# Patient Record
Sex: MALE | Race: WHITE | NOT HISPANIC OR LATINO | Employment: OTHER | URBAN - METROPOLITAN AREA
[De-identification: names, ages, dates, MRNs, and addresses within clinical notes are randomized per-mention and may not be internally consistent; named-entity substitution may affect disease eponyms.]

---

## 2017-11-02 ENCOUNTER — TELEPHONE (OUTPATIENT)
Dept: ORTHOPEDICS | Facility: CLINIC | Age: 52
End: 2017-11-02

## 2017-11-02 ENCOUNTER — TELEPHONE (OUTPATIENT)
Dept: SPINE | Facility: CLINIC | Age: 52
End: 2017-11-02

## 2017-11-02 DIAGNOSIS — M54.2 CERVICAL SPINE PAIN: Primary | ICD-10-CM

## 2017-11-10 ENCOUNTER — HOSPITAL ENCOUNTER (OUTPATIENT)
Dept: RADIOLOGY | Facility: HOSPITAL | Age: 52
Discharge: HOME | End: 2017-11-10
Attending: ORTHOPAEDIC SURGERY
Payer: MEDICARE

## 2017-11-10 ENCOUNTER — OFFICE VISIT (OUTPATIENT)
Dept: ORTHOPEDICS | Facility: CLINIC | Age: 52
End: 2017-11-10
Payer: MEDICARE

## 2017-11-10 VITALS — HEIGHT: 67 IN | WEIGHT: 185 LBS | BODY MASS INDEX: 29.03 KG/M2

## 2017-11-10 DIAGNOSIS — M48.02 CERVICAL STENOSIS OF SPINE: Primary | ICD-10-CM

## 2017-11-10 DIAGNOSIS — M54.2 CERVICAL SPINE PAIN: ICD-10-CM

## 2017-11-10 PROCEDURE — 99203 OFFICE O/P NEW LOW 30 MIN: CPT | Mod: S$PBB,,, | Performed by: ORTHOPAEDIC SURGERY

## 2017-11-10 PROCEDURE — 72050 X-RAY EXAM NECK SPINE 4/5VWS: CPT | Mod: TC

## 2017-11-10 PROCEDURE — 72050 X-RAY EXAM NECK SPINE 4/5VWS: CPT | Mod: 26,,, | Performed by: RADIOLOGY

## 2017-11-10 PROCEDURE — 99999 PR PBB SHADOW E&M-EST. PATIENT-LVL II: CPT | Mod: PBBFAC,,, | Performed by: ORTHOPAEDIC SURGERY

## 2017-11-10 PROCEDURE — 99212 OFFICE O/P EST SF 10 MIN: CPT | Mod: PBBFAC,25 | Performed by: ORTHOPAEDIC SURGERY

## 2017-11-10 RX ORDER — ASPIRIN 81 MG/1
81 TABLET ORAL DAILY
COMMUNITY
End: 2022-04-25
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
## 2017-11-16 ENCOUNTER — PATIENT MESSAGE (OUTPATIENT)
Dept: RHEUMATOLOGY | Facility: CLINIC | Age: 52
End: 2017-11-16

## 2017-11-18 ENCOUNTER — HOSPITAL ENCOUNTER (OUTPATIENT)
Dept: RADIOLOGY | Facility: HOSPITAL | Age: 52
Discharge: HOME | End: 2017-11-18
Attending: ORTHOPAEDIC SURGERY
Payer: MEDICARE

## 2017-11-18 DIAGNOSIS — M48.02 CERVICAL STENOSIS OF SPINE: ICD-10-CM

## 2017-11-18 PROCEDURE — 72141 MRI NECK SPINE W/O DYE: CPT | Mod: TC,PO

## 2017-11-18 PROCEDURE — 72141 MRI NECK SPINE W/O DYE: CPT | Mod: 26,,, | Performed by: RADIOLOGY

## 2017-11-21 ENCOUNTER — LAB VISIT (OUTPATIENT)
Dept: LAB | Facility: HOSPITAL | Age: 52
End: 2017-11-21
Attending: INTERNAL MEDICINE
Payer: MEDICARE

## 2017-11-21 ENCOUNTER — PATIENT MESSAGE (OUTPATIENT)
Dept: RHEUMATOLOGY | Facility: CLINIC | Age: 52
End: 2017-11-21

## 2017-11-21 ENCOUNTER — OFFICE VISIT (OUTPATIENT)
Dept: RHEUMATOLOGY | Facility: CLINIC | Age: 52
End: 2017-11-21
Payer: MEDICARE

## 2017-11-21 VITALS
HEART RATE: 77 BPM | RESPIRATION RATE: 18 BRPM | DIASTOLIC BLOOD PRESSURE: 71 MMHG | WEIGHT: 175 LBS | SYSTOLIC BLOOD PRESSURE: 115 MMHG | BODY MASS INDEX: 27.47 KG/M2 | HEIGHT: 67 IN

## 2017-11-21 DIAGNOSIS — M35.00 SJOGREN'S SYNDROME, WITH UNSPECIFIED ORGAN INVOLVEMENT: ICD-10-CM

## 2017-11-21 DIAGNOSIS — M35.00 SJOGREN'S SYNDROME, WITH UNSPECIFIED ORGAN INVOLVEMENT: Primary | ICD-10-CM

## 2017-11-21 LAB
ALBUMIN SERPL BCP-MCNC: 4 G/DL
ALP SERPL-CCNC: 43 U/L
ALT SERPL W/O P-5'-P-CCNC: 18 U/L
ANION GAP SERPL CALC-SCNC: 10 MMOL/L
AST SERPL-CCNC: 16 U/L
BASOPHILS # BLD AUTO: 0.07 K/UL
BASOPHILS NFR BLD: 1.2 %
BILIRUB SERPL-MCNC: 0.6 MG/DL
BUN SERPL-MCNC: 14 MG/DL
CALCIUM SERPL-MCNC: 9.6 MG/DL
CCP AB SER IA-ACNC: <0.5 U/ML
CHLORIDE SERPL-SCNC: 106 MMOL/L
CO2 SERPL-SCNC: 28 MMOL/L
CREAT SERPL-MCNC: 0.9 MG/DL
CRP SERPL-MCNC: 1.9 MG/L
DIFFERENTIAL METHOD: NORMAL
EOSINOPHIL # BLD AUTO: 0.1 K/UL
EOSINOPHIL NFR BLD: 2.3 %
ERYTHROCYTE [DISTWIDTH] IN BLOOD BY AUTOMATED COUNT: 11.9 %
ERYTHROCYTE [SEDIMENTATION RATE] IN BLOOD BY WESTERGREN METHOD: 2 MM/HR
EST. GFR  (AFRICAN AMERICAN): >60 ML/MIN/1.73 M^2
EST. GFR  (NON AFRICAN AMERICAN): >60 ML/MIN/1.73 M^2
GLUCOSE SERPL-MCNC: 75 MG/DL
HBV CORE IGM SERPL QL IA: NEGATIVE
HBV SURFACE AB SER-ACNC: NEGATIVE M[IU]/ML
HBV SURFACE AG SERPL QL IA: NEGATIVE
HCT VFR BLD AUTO: 43.7 %
HCV AB SERPL QL IA: NEGATIVE
HGB BLD-MCNC: 14.6 G/DL
IMM GRANULOCYTES # BLD AUTO: 0.01 K/UL
IMM GRANULOCYTES NFR BLD AUTO: 0.2 %
LYMPHOCYTES # BLD AUTO: 1.8 K/UL
LYMPHOCYTES NFR BLD: 29.9 %
MCH RBC QN AUTO: 29.9 PG
MCHC RBC AUTO-ENTMCNC: 33.4 G/DL
MCV RBC AUTO: 89 FL
MONOCYTES # BLD AUTO: 0.4 K/UL
MONOCYTES NFR BLD: 7.2 %
NEUTROPHILS # BLD AUTO: 3.5 K/UL
NEUTROPHILS NFR BLD: 59.2 %
NRBC BLD-RTO: 0 /100 WBC
PLATELET # BLD AUTO: 230 K/UL
PMV BLD AUTO: 10 FL
POTASSIUM SERPL-SCNC: 3.9 MMOL/L
PROT SERPL-MCNC: 7.3 G/DL
RBC # BLD AUTO: 4.89 M/UL
RHEUMATOID FACT SERPL-ACNC: <10 IU/ML
SODIUM SERPL-SCNC: 144 MMOL/L
WBC # BLD AUTO: 5.96 K/UL

## 2017-11-21 PROCEDURE — 86038 ANTINUCLEAR ANTIBODIES: CPT

## 2017-11-21 PROCEDURE — 86235 NUCLEAR ANTIGEN ANTIBODY: CPT

## 2017-11-21 PROCEDURE — 86431 RHEUMATOID FACTOR QUANT: CPT

## 2017-11-21 PROCEDURE — 99999 PR PBB SHADOW E&M-EST. PATIENT-LVL III: CPT | Mod: PBBFAC,,, | Performed by: INTERNAL MEDICINE

## 2017-11-21 PROCEDURE — 99203 OFFICE O/P NEW LOW 30 MIN: CPT | Mod: S$PBB,,, | Performed by: INTERNAL MEDICINE

## 2017-11-21 PROCEDURE — 86235 NUCLEAR ANTIGEN ANTIBODY: CPT | Mod: 59

## 2017-11-21 PROCEDURE — 84165 PROTEIN E-PHORESIS SERUM: CPT | Mod: 26,,, | Performed by: PATHOLOGY

## 2017-11-21 PROCEDURE — 86705 HEP B CORE ANTIBODY IGM: CPT

## 2017-11-21 PROCEDURE — 86706 HEP B SURFACE ANTIBODY: CPT

## 2017-11-21 PROCEDURE — 86200 CCP ANTIBODY: CPT

## 2017-11-21 PROCEDURE — 99213 OFFICE O/P EST LOW 20 MIN: CPT | Mod: PBBFAC | Performed by: INTERNAL MEDICINE

## 2017-11-21 PROCEDURE — 36415 COLL VENOUS BLD VENIPUNCTURE: CPT

## 2017-11-21 PROCEDURE — 86140 C-REACTIVE PROTEIN: CPT

## 2017-11-21 PROCEDURE — 80053 COMPREHEN METABOLIC PANEL: CPT

## 2017-11-21 PROCEDURE — 87340 HEPATITIS B SURFACE AG IA: CPT

## 2017-11-21 PROCEDURE — 86803 HEPATITIS C AB TEST: CPT

## 2017-11-21 PROCEDURE — 85651 RBC SED RATE NONAUTOMATED: CPT

## 2017-11-21 PROCEDURE — 85025 COMPLETE CBC W/AUTO DIFF WBC: CPT

## 2017-11-21 PROCEDURE — 84165 PROTEIN E-PHORESIS SERUM: CPT

## 2017-11-21 PROCEDURE — 82787 IGG 1 2 3 OR 4 EACH: CPT | Mod: 59

## 2017-11-21 RX ORDER — BUPROPION HYDROCHLORIDE 300 MG/1
300 TABLET ORAL DAILY
COMMUNITY
End: 2020-02-12

## 2017-11-21 RX ORDER — PILOCARPINE HYDROCHLORIDE 5 MG/1
5 TABLET, FILM COATED ORAL 2 TIMES DAILY
Qty: 60 TABLET | Refills: 11 | Status: SHIPPED | OUTPATIENT
Start: 2017-11-21 | End: 2018-10-02 | Stop reason: SDUPTHER

## 2017-11-21 RX ORDER — CEPHALEXIN 500 MG/1
CAPSULE ORAL
COMMUNITY
Start: 2017-09-05 | End: 2017-12-14

## 2017-11-21 RX ORDER — DIPHENHYDRAMINE HCL 25 MG
25 CAPSULE ORAL
COMMUNITY
Start: 2017-08-29 | End: 2017-12-14

## 2017-11-21 RX ORDER — PILOCARPINE HYDROCHLORIDE 5 MG/1
5 TABLET, FILM COATED ORAL 2 TIMES DAILY
Qty: 60 TABLET | Refills: 11 | Status: SHIPPED | OUTPATIENT
Start: 2017-11-21 | End: 2017-12-14 | Stop reason: SDUPTHER

## 2017-11-21 RX ORDER — PILOCARPINE HYDROCHLORIDE 5 MG/1
TABLET, FILM COATED ORAL
COMMUNITY
Start: 2017-10-12 | End: 2017-11-21 | Stop reason: SDUPTHER

## 2017-11-21 RX ORDER — ROSUVASTATIN CALCIUM 10 MG/1
TABLET, COATED ORAL
COMMUNITY
Start: 2017-11-14 | End: 2017-11-21 | Stop reason: SDUPTHER

## 2017-11-21 RX ORDER — CLINDAMYCIN HYDROCHLORIDE 300 MG/1
CAPSULE ORAL
COMMUNITY
Start: 2017-10-11 | End: 2017-12-14

## 2017-11-21 RX ORDER — ROSUVASTATIN CALCIUM 10 MG/1
10 TABLET, COATED ORAL NIGHTLY
COMMUNITY
End: 2022-10-13

## 2017-11-21 RX ORDER — METHYLPREDNISOLONE 4 MG/1
TABLET ORAL
COMMUNITY
Start: 2017-09-05 | End: 2017-12-14
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
## 2017-11-22 LAB
ACE SERPL-CCNC: 22 U/L
ALBUMIN SERPL ELPH-MCNC: 4.36 G/DL
ALPHA1 GLOB SERPL ELPH-MCNC: 0.33 G/DL
ALPHA2 GLOB SERPL ELPH-MCNC: 0.81 G/DL
ANA SER QL IF: NORMAL
B-GLOBULIN SERPL ELPH-MCNC: 0.78 G/DL
GAMMA GLOB SERPL ELPH-MCNC: 0.72 G/DL
PATHOLOGIST INTERPRETATION SPE: NORMAL
PROT SERPL-MCNC: 7 G/DL

## 2017-11-24 LAB
IGG1 SER-MCNC: 435 MG/DL
IGG2 SER-MCNC: 183 MG/DL
IGG3 SER-MCNC: 37 MG/DL
IGG4 SER-MCNC: 25 MG/DL

## 2017-11-25 LAB
ANTI-SSA ANTIBODY: 25.54 EU
ANTI-SSA INTERPRETATION: POSITIVE
ANTI-SSB ANTIBODY: 0.37 EU
ANTI-SSB INTERPRETATION: NEGATIVE

## 2017-12-04 ENCOUNTER — OFFICE VISIT (OUTPATIENT)
Dept: ORTHOPEDICS | Facility: CLINIC | Age: 52
End: 2017-12-04
Payer: MEDICARE

## 2017-12-04 VITALS — BODY MASS INDEX: 27.47 KG/M2 | HEIGHT: 67 IN | WEIGHT: 175 LBS

## 2017-12-04 DIAGNOSIS — G95.9 CERVICAL MYELOPATHY: Primary | ICD-10-CM

## 2017-12-04 PROCEDURE — 99212 OFFICE O/P EST SF 10 MIN: CPT | Mod: PBBFAC | Performed by: ORTHOPAEDIC SURGERY

## 2017-12-04 PROCEDURE — 99213 OFFICE O/P EST LOW 20 MIN: CPT | Mod: S$PBB,,, | Performed by: ORTHOPAEDIC SURGERY

## 2017-12-04 PROCEDURE — 99999 PR PBB SHADOW E&M-EST. PATIENT-LVL II: CPT | Mod: PBBFAC,,, | Performed by: ORTHOPAEDIC SURGERY

## 2017-12-05 NOTE — PROGRESS NOTES
The patient returns for MRI follow up.    He had suspected cervical stenosis seen on an MRI of the parotids.    He does not have severe myelopathic symptoms.    Today I reviewed his MRI that demonstrates severe C3/4 stenosis with evidence of cord signal abnormality.    Today we discussed options, I think he is a candidate for a C3/4 ACDF.    I had a sit down discussion with the patient regarding cervical myelopathy. I discussed the known stepwise degeneration of cervical myelopathy. I discussed the risks and benefits of decompressive surgery, including the concept that surgery would be done to prevent further neurological injury/degeneration rather than to ameliorate any existing deficits.     I spent 15 minutes with the patient of which greater than 1/2 the time was devoted to counciling the patient regarding treatment options.

## 2017-12-07 DIAGNOSIS — G95.9 CERVICAL MYELOPATHY: Primary | ICD-10-CM

## 2017-12-07 RX ORDER — MUPIROCIN 20 MG/G
OINTMENT TOPICAL
Status: CANCELLED | OUTPATIENT
Start: 2017-12-07

## 2017-12-07 RX ORDER — SODIUM CHLORIDE 9 MG/ML
INJECTION, SOLUTION INTRAVENOUS CONTINUOUS
Status: CANCELLED | OUTPATIENT
Start: 2017-12-07

## 2017-12-14 ENCOUNTER — OFFICE VISIT (OUTPATIENT)
Dept: ENDOCRINOLOGY | Facility: CLINIC | Age: 52
End: 2017-12-14
Payer: MEDICARE

## 2017-12-14 VITALS
HEIGHT: 67 IN | WEIGHT: 175.25 LBS | DIASTOLIC BLOOD PRESSURE: 80 MMHG | HEART RATE: 80 BPM | BODY MASS INDEX: 27.51 KG/M2 | SYSTOLIC BLOOD PRESSURE: 116 MMHG

## 2017-12-14 DIAGNOSIS — R94.6 ABNORMAL THYROID FUNCTION TEST: Primary | ICD-10-CM

## 2017-12-14 PROCEDURE — 99999 PR PBB SHADOW E&M-EST. PATIENT-LVL III: CPT | Mod: PBBFAC,,, | Performed by: INTERNAL MEDICINE

## 2017-12-14 PROCEDURE — 99203 OFFICE O/P NEW LOW 30 MIN: CPT | Mod: S$PBB,,, | Performed by: INTERNAL MEDICINE

## 2017-12-14 PROCEDURE — 99213 OFFICE O/P EST LOW 20 MIN: CPT | Mod: PBBFAC | Performed by: INTERNAL MEDICINE

## 2017-12-14 NOTE — PROGRESS NOTES
Subjective:     Patient ID: Van Jones is a 52 y.o. male.    Chief Complaint: No chief complaint on file.    HPI:   Mr. Jones is a 52 y.o. male who is here for a follow-up visit for evaluation of hypothyroidism that was discovered by his ENT.   Recently diagnosed by Sjogren's syndrome.     Reports depressed mood and fatigue that has improved with Wellburtrin. Has intentional weight loss ( 50 lbs over the past two and half years).  Denies constipation or cold intolerance.   Denies changes to appearance of anterior neck, denies dysphagia, hoarseness.   Denies treatment with thyroid hormone in the past.     Family history:  Father and sister with hypothyroidism.     Review of Systems   Constitutional: Negative for chills and fever.   HENT: Negative for congestion and sinus pressure.    Eyes: Negative for visual disturbance.   Respiratory: Negative for chest tightness and shortness of breath.    Cardiovascular: Negative for chest pain and palpitations.   Gastrointestinal: Negative for abdominal distention, diarrhea, nausea and vomiting.   Genitourinary: Negative for dysuria and flank pain.   Musculoskeletal: Negative for back pain.   Skin: Negative for rash.   Neurological: Negative for weakness.   Hematological: Does not bruise/bleed easily.   Psychiatric/Behavioral: Negative for sleep disturbance.        Objective:     Physical Exam   Constitutional: He appears well-developed and well-nourished. No distress.   HENT:   Head: Normocephalic and atraumatic.   Mouth/Throat: No oropharyngeal exudate.   Eyes: Conjunctivae and EOM are normal. Pupils are equal, round, and reactive to light.   Neck: Normal range of motion. Neck supple. No tracheal deviation present. No thyromegaly present.   Cardiovascular: Normal rate and regular rhythm.    Pulmonary/Chest: Effort normal and breath sounds normal.   Musculoskeletal:   No tremor.   Lymphadenopathy:     He has no cervical adenopathy.   Neurological: He has normal  "reflexes.   Skin: Skin is warm and dry.   Psychiatric: He has a normal mood and affect.       Vitals:    12/14/17 1047   BP: 116/80   BP Location: Left arm   Patient Position: Sitting   BP Method: Medium (Manual)   Pulse: 80   Weight: 79.5 kg (175 lb 4.3 oz)   Height: 5' 7" (1.702 m)         Assessment/Plan:     Mr. Jones is a 52 year old gentleman who appears clinically euthyroid. [facility] labs normal, but he was diagnosed with hypothyroidism by ENT. Will repeat labs today.   Monitor if labs are borderline  Treat if TSH > 10  Normal, monitor every 1 - 2 years due to family history.   1. Abnormal thyroid function test    - TSH; Future  - T4, free; Future          "

## 2017-12-28 ENCOUNTER — TELEPHONE (OUTPATIENT)
Dept: RHEUMATOLOGY | Facility: CLINIC | Age: 52
End: 2017-12-28

## 2017-12-28 DIAGNOSIS — M79.603 PAIN OF UPPER EXTREMITY, UNSPECIFIED LATERALITY: Primary | ICD-10-CM

## 2017-12-29 ENCOUNTER — ANESTHESIA EVENT (OUTPATIENT)
Dept: SURGERY | Facility: HOSPITAL | Age: 52
End: 2017-12-29
Payer: MEDICARE

## 2017-12-29 NOTE — ANESTHESIA PREPROCEDURE EVALUATION
01/23/2018  Van Jones is a 52 y.o., male.  Pre-operative evaluation for Procedure(s) (LRB):  CERVICAL TOTAL DISC REPLACEMENT vs. ACDF C3/4 LDR Mobi-C SNS: Motors/SSEP/EMG/Vocal Cord Regular OR Table (N/A)    Van Jones is a 52 y.o. male     Patient Active Problem List   Diagnosis    Colon cancer screening    Cervical myelopathy    Depression    RSD (reflex sympathetic dystrophy)    History of pulmonary embolism    Sjogren's syndrome    HLD (hyperlipidemia)    NSAID long-term use       Review of patient's allergies indicates:   Allergen Reactions    Lamisil [terbinafine hcl] Rash       No current facility-administered medications on file prior to encounter.      Current Outpatient Prescriptions on File Prior to Encounter   Medication Sig Dispense Refill    aspirin (ECOTRIN) 81 MG EC tablet Take 81 mg by mouth once daily.      buPROPion (WELLBUTRIN XL) 300 MG 24 hr tablet Take 300 mg by mouth once daily.       multivitamin capsule Take 1 capsule by mouth once daily.      pilocarpine (SALAGEN) 5 MG Tab Take 1 tablet (5 mg total) by mouth 2 (two) times daily. 60 tablet 11    rosuvastatin (CRESTOR) 10 MG tablet Take 10 mg by mouth every evening.          Past Surgical History:   Procedure Laterality Date    COLONOSCOPY N/A 2/25/2016    Procedure: COLONOSCOPY;  Surgeon: Daryl Viramontes MD;  Location: [address]);  Service: Endoscopy;  Laterality: N/A;  PM Prep      FEMUR SURGERY      HERNIA REPAIR      umbilical    TONSILLECTOMY         Social History     Social History    Marital status:      Spouse name: N/A    Number of children: N/A    Years of education: N/A     Occupational History    Not on file.     Social History Main Topics    Smoking status: Never Smoker    Smokeless tobacco: Never Used    Alcohol use Yes      Comment: occasional    Drug use: No     Sexual activity: Not on file     Other Topics Concern    Not on file     Social History Narrative    No narrative on file         Vital Signs Range (Last 24H):  Temp:  [36.5 °C (97.7 °F)]   Pulse:  [83]   Resp:  [16]   BP: (98)/(62)   SpO2:  [97 %]       CBC: No results for input(s): WBC, RBC, HGB, HCT, PLT, MCV, MCH, MCHC in the last 72 hours.    CMP: No results for input(s): NA, K, CL, CO2, BUN, CREATININE, GLU, MG, PHOS, CALCIUM, ALBUMIN, PROT, ALKPHOS, ALT, AST, BILITOT in the last 72 hours.    INR  No results for input(s): PT, INR, PROTIME, APTT in the last 72 hours.        Diagnostic Studies:      [facility] Echo:              Anesthesia Assessment: Preoperative EQUATION    Planned Procedure: Procedure(s) (LRB):  CERVICAL TOTAL DISC REPLACEMENT vs. ACDF C3/4 LDR Mobi-C SNS: Motors/SSEP/EMG/Vocal Cord Regular OR Table (N/A)  Requested Anesthesia Type:General  Surgeon: Saleem Noguera MD  Service: Neurosurgery  Known or anticipated Date of Surgery:2018      Optimization:  Anesthesia Preop Clinic Assessment  Indicated    Medical Opinion Indicated  DR GAITAN       Plan:    Testing:  PT/INR and T&S       Consultation:IM Perioperative Hospitalist    Navigation: Tests Scheduled.              Consults scheduled.             Results will be tracked by Preop Clinic  CLEARANCE RECEIVED PLACED IN MEDIA 18                        No anesthesia preop clinic visit.                                                                                                               2017  Van Jones is a 52 y.o., male.    Anesthesia Evaluation    I have reviewed the Patient Summary Reports.     I have reviewed the Medications.     Review of Systems  Anesthesia Hx:  No problems with previous Anesthesia    Social:  No Alcohol Use, Non-Smoker    Hematology/Oncology:  Hematology Normal   Oncology Normal     EENT/Dental:EENT/Dental Normal   Cardiovascular:   Exercise tolerance: good    Pulmonary:   pulmomary  embolism @ age of 17   Renal/:  Renal/ Normal     Hepatic/GI:  Hepatic/GI Normal    Neurological:   Neuromuscular Disease,    Endocrine:  Endocrine Normal    Psych:   depression      SYLVAIN MARCIAL 12/29/17    Physical Exam  General:  Well nourished    Airway/Jaw/Neck:  Airway Findings: Mouth Opening: Normal Tongue: Normal  Mallampati: II  TM Distance: Normal, at least 6 cm      Dental:  Dental Findings: In tact        Mental Status:  Mental Status Findings:  Cooperative, Alert and Oriented         Anesthesia Plan  Type of Anesthesia, risks & benefits discussed:  Anesthesia Type:  general  Patient's Preference: same   Intra-op Monitoring Plan: arterial line and standard ASA monitors  Intra-op Monitoring Plan Comments:   Post Op Pain Control Plan: IV/PO Opioids PRN  Post Op Pain Control Plan Comments:   Induction:   IV  Beta Blocker:  Patient is not currently on a Beta-Blocker (No further documentation required).       Informed Consent: Patient understands risks and agrees with Anesthesia plan.  Questions answered. Anesthesia consent signed with patient.  ASA Score: 3     Day of Surgery Review of History & Physical:    H&P update referred to the surgeon.         Ready For Surgery From Anesthesia Perspective.

## 2017-12-29 NOTE — PRE ADMISSION SCREENING
Anesthesia Assessment: Preoperative EQUATION    Planned Procedure: Procedure(s) (LRB):  CERVICAL TOTAL DISC REPLACEMENT vs. ACDF C3/4 LDR Mobi-C SNS: Motors/SSEP/EMG/Vocal Cord Regular OR Table (N/A)  Requested Anesthesia Type:General  Surgeon: Saleem Noguera MD  Service: Neurosurgery  Known or anticipated Date of Surgery:1/25/2018      Optimization:  Anesthesia Preop Clinic Assessment  Indicated    Medical Opinion Indicated  DR GAITAN       Plan:    Testing:  PT/INR and T&S       Consultation: Perioperative Hospitalist    Navigation: Tests Scheduled.              Consults scheduled.             Results will be tracked by Preop Clinic                        No anesthesia preop clinic visit.

## 2018-01-08 DIAGNOSIS — Z98.890 S/P SPINAL SURGERY: Primary | ICD-10-CM

## 2018-01-09 ENCOUNTER — LAB VISIT (OUTPATIENT)
Dept: LAB | Facility: HOSPITAL | Age: 53
End: 2018-01-09
Attending: INTERNAL MEDICINE
Payer: MEDICARE

## 2018-01-09 DIAGNOSIS — I25.10 CORONARY ATHEROSCLEROSIS OF NATIVE CORONARY ARTERY: Primary | ICD-10-CM

## 2018-01-09 DIAGNOSIS — G90.522 COMPLEX REGIONAL PAIN SYNDROME OF LEFT LOWER EXTREMITY: ICD-10-CM

## 2018-01-09 DIAGNOSIS — E78.00 PURE HYPERCHOLESTEROLEMIA: ICD-10-CM

## 2018-01-09 LAB
ALBUMIN SERPL BCP-MCNC: 3.8 G/DL
ALP SERPL-CCNC: 40 U/L
ALT SERPL W/O P-5'-P-CCNC: 31 U/L
ANION GAP SERPL CALC-SCNC: 9 MMOL/L
AST SERPL-CCNC: 18 U/L
BASOPHILS # BLD AUTO: 0.07 K/UL
BASOPHILS NFR BLD: 1.1 %
BILIRUB SERPL-MCNC: 0.5 MG/DL
BUN SERPL-MCNC: 15 MG/DL
CALCIUM SERPL-MCNC: 9.5 MG/DL
CHLORIDE SERPL-SCNC: 108 MMOL/L
CHOLEST SERPL-MCNC: 131 MG/DL
CHOLEST/HDLC SERPL: 3 {RATIO}
CO2 SERPL-SCNC: 27 MMOL/L
CREAT SERPL-MCNC: 1 MG/DL
DIFFERENTIAL METHOD: NORMAL
EOSINOPHIL # BLD AUTO: 0.2 K/UL
EOSINOPHIL NFR BLD: 3.2 %
ERYTHROCYTE [DISTWIDTH] IN BLOOD BY AUTOMATED COUNT: 11.9 %
EST. GFR  (AFRICAN AMERICAN): >60 ML/MIN/1.73 M^2
EST. GFR  (NON AFRICAN AMERICAN): >60 ML/MIN/1.73 M^2
GLUCOSE SERPL-MCNC: 100 MG/DL
HCT VFR BLD AUTO: 44.5 %
HDLC SERPL-MCNC: 43 MG/DL
HDLC SERPL: 32.8 %
HGB BLD-MCNC: 14.9 G/DL
IMM GRANULOCYTES # BLD AUTO: 0.01 K/UL
IMM GRANULOCYTES NFR BLD AUTO: 0.2 %
LDLC SERPL CALC-MCNC: 74.4 MG/DL
LYMPHOCYTES # BLD AUTO: 2.2 K/UL
LYMPHOCYTES NFR BLD: 33.8 %
MCH RBC QN AUTO: 30.6 PG
MCHC RBC AUTO-ENTMCNC: 33.5 G/DL
MCV RBC AUTO: 91 FL
MONOCYTES # BLD AUTO: 0.6 K/UL
MONOCYTES NFR BLD: 9.4 %
NEUTROPHILS # BLD AUTO: 3.4 K/UL
NEUTROPHILS NFR BLD: 52.3 %
NONHDLC SERPL-MCNC: 88 MG/DL
NRBC BLD-RTO: 0 /100 WBC
PLATELET # BLD AUTO: 228 K/UL
PMV BLD AUTO: 10.4 FL
POTASSIUM SERPL-SCNC: 4.2 MMOL/L
PROT SERPL-MCNC: 6.8 G/DL
RBC # BLD AUTO: 4.87 M/UL
SODIUM SERPL-SCNC: 144 MMOL/L
TRIGL SERPL-MCNC: 68 MG/DL
WBC # BLD AUTO: 6.47 K/UL

## 2018-01-09 PROCEDURE — 36415 COLL VENOUS BLD VENIPUNCTURE: CPT | Mod: PO

## 2018-01-09 PROCEDURE — 80053 COMPREHEN METABOLIC PANEL: CPT

## 2018-01-09 PROCEDURE — 80061 LIPID PANEL: CPT

## 2018-01-09 PROCEDURE — 85025 COMPLETE CBC W/AUTO DIFF WBC: CPT

## 2018-01-12 ENCOUNTER — INITIAL CONSULT (OUTPATIENT)
Dept: INTERNAL MEDICINE | Facility: CLINIC | Age: 53
End: 2018-01-12
Payer: MEDICARE

## 2018-01-12 ENCOUNTER — OFFICE VISIT (OUTPATIENT)
Dept: ORTHOPEDICS | Facility: CLINIC | Age: 53
End: 2018-01-12
Payer: MEDICARE

## 2018-01-12 ENCOUNTER — LAB VISIT (OUTPATIENT)
Dept: LAB | Facility: HOSPITAL | Age: 53
End: 2018-01-12
Attending: ANESTHESIOLOGY
Payer: MEDICARE

## 2018-01-12 VITALS
HEIGHT: 67 IN | WEIGHT: 174.5 LBS | DIASTOLIC BLOOD PRESSURE: 74 MMHG | BODY MASS INDEX: 27.39 KG/M2 | SYSTOLIC BLOOD PRESSURE: 112 MMHG | TEMPERATURE: 98 F | HEART RATE: 83 BPM | OXYGEN SATURATION: 96 %

## 2018-01-12 DIAGNOSIS — F32.A DEPRESSION, UNSPECIFIED DEPRESSION TYPE: ICD-10-CM

## 2018-01-12 DIAGNOSIS — G90.50 RSD (REFLEX SYMPATHETIC DYSTROPHY): ICD-10-CM

## 2018-01-12 DIAGNOSIS — E78.5 HYPERLIPIDEMIA, UNSPECIFIED HYPERLIPIDEMIA TYPE: ICD-10-CM

## 2018-01-12 DIAGNOSIS — Z86.711 HISTORY OF PULMONARY EMBOLISM: ICD-10-CM

## 2018-01-12 DIAGNOSIS — Z79.1 NSAID LONG-TERM USE: ICD-10-CM

## 2018-01-12 DIAGNOSIS — G95.9 CERVICAL MYELOPATHY: Primary | ICD-10-CM

## 2018-01-12 DIAGNOSIS — M35.00 SJOGREN'S SYNDROME, WITH UNSPECIFIED ORGAN INVOLVEMENT: ICD-10-CM

## 2018-01-12 DIAGNOSIS — M79.603 PAIN OF UPPER EXTREMITY, UNSPECIFIED LATERALITY: ICD-10-CM

## 2018-01-12 LAB
ABO + RH BLD: NORMAL
BLD GP AB SCN CELLS X3 SERPL QL: NORMAL
INR PPP: 1.1
PROTHROMBIN TIME: 11.2 SEC

## 2018-01-12 PROCEDURE — 99213 OFFICE O/P EST LOW 20 MIN: CPT | Mod: PBBFAC,25 | Performed by: HOSPITALIST

## 2018-01-12 PROCEDURE — 99212 OFFICE O/P EST SF 10 MIN: CPT | Mod: PBBFAC,25,27 | Performed by: ORTHOPAEDIC SURGERY

## 2018-01-12 PROCEDURE — 99999 PR PBB SHADOW E&M-EST. PATIENT-LVL II: CPT | Mod: PBBFAC,,, | Performed by: ORTHOPAEDIC SURGERY

## 2018-01-12 PROCEDURE — 99204 OFFICE O/P NEW MOD 45 MIN: CPT | Mod: S$PBB,,, | Performed by: HOSPITALIST

## 2018-01-12 PROCEDURE — 86901 BLOOD TYPING SEROLOGIC RH(D): CPT

## 2018-01-12 PROCEDURE — 99999 PR PBB SHADOW E&M-EST. PATIENT-LVL III: CPT | Mod: PBBFAC,,, | Performed by: HOSPITALIST

## 2018-01-12 PROCEDURE — 85610 PROTHROMBIN TIME: CPT

## 2018-01-12 PROCEDURE — 99212 OFFICE O/P EST SF 10 MIN: CPT | Mod: S$PBB,,, | Performed by: ORTHOPAEDIC SURGERY

## 2018-01-12 PROCEDURE — 36415 COLL VENOUS BLD VENIPUNCTURE: CPT

## 2018-01-12 RX ORDER — CHLORHEXIDINE GLUCONATE 40 MG/ML
SOLUTION TOPICAL DAILY PRN
Qty: 118 ML | Refills: 0 | Status: ON HOLD | OUTPATIENT
Start: 2018-01-12 | End: 2018-01-23 | Stop reason: HOSPADM
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
## 2018-01-12 NOTE — ASSESSMENT & PLAN NOTE
I suggest monitoring the sodium as SIADH from Wellbutrin use and hypersecretion of ADH associated with surgery can reduce sodium in the perioperative period

## 2018-01-12 NOTE — ASSESSMENT & PLAN NOTE
Was evaluated for parotid gland swelling( Left > Rt)  that started in October 2017 , no pain  Had imaging for that that showed spine problem   lip biopsy report  was suggestive of Sjogrens.  No Dry mouth, dry eyes  No other problems  Sister has Sjogren's,Lupus    Hold cholinergic on the AM of surgery due to risk of upper respiratory secretions with endo Tracheal intubation
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
## 2018-01-12 NOTE — OUTPATIENT SUBJECTIVE & OBJECTIVE
Outpatient Subjective & Objective     Chief complaint-Preoperative evaluation, Perioperative Medical management, complication reduction plan     Active cardiac conditions- none    Revised cardiac risk index predictors- none    Functional capacity -Examples of physical activity, walks,   can take a flight of stairs holding on to the railing----- He can undertake all the above activities without  chest pain,chest tightness, Shortness of breath ,dizziness,lightheadedness making his exercise tolerance more  than 4 Mets.       Review of Systems   Constitutional: Negative for chills and fever.        No unusual weight changes  Lost 50 pounds over 2-3 years with diet    HENT:              STOPBANTAMERA- 3/8       Age over 50 years  Neck size over 40 CM  Male gender   Eyes:        No unusual vision changes   Respiratory:        No Cough ,Phlegm, Hemoptysis      Cardiovascular:        As noted   Gastrointestinal:        Bowels- Regular   No overt GI/ blood losses   Endocrine:        Recent steroid use- none   Genitourinary: Negative for dysuria.        No urinary hesitancy    Musculoskeletal:        As above      Skin: Negative for rash.   Neurological: Negative for syncope.        No unilateral weakness   Hematological:        Current use of Anticoagulants/ Antiplatelet agents  ASA prevention    Psychiatric/Behavioral:        Depression  Controlled      No vascular stenting    No past medical history pertinent negatives.  No family history on file.  Past Surgical History:   Procedure Laterality Date    COLONOSCOPY N/A 2/25/2016    Procedure: COLONOSCOPY;  Surgeon: Daryl Viramontes MD;  Location: [address]);  Service: Endoscopy;  Laterality: N/A;  PM Prep      FEMUR SURGERY      TONSILLECTOMY         No anesthesia, bleeding, cardiac problems with previous surgeries/procedures.  FH- No anesthesia, thrombosis / bleeding ,in family   Medications and Allergies reviewed in epic.   Lives alone, children come every other  "weekend    Physical Exam   HENT:   Head: Normocephalic.       Physical Exam   HENT:   Head: Normocephalic.     Constitutional- Vitals - Body mass index is 27.33 kg/m².,   Vitals:    01/12/18 1257   BP: 112/74   Pulse: 83   Temp: 98.4 °F (36.9 °C)     General appearance-Conscious,Coherent  Eyes- No conjunctival icterus,pupils  round  and reactive to light   ENT-Oral cavity- moist  , Hearing grossly normal   Neck- No thyromegaly ,Trachea -central, No jugular venous distension,   No Carotid Bruit   Cardiovascular -Heart Sounds- Normal  and  no murmur   , No gallop rhythm   Respiratory - Normal Respiratory Effort, Normal breath sounds and  no wheeze    Peripheral pitting pedal edema-- none , no calf pain   Gastrointestinal -Soft abdomen, No palpable masses, Non Tender,Liver,Spleen not palpable. No-- free fluid and shifting dullness  Musculoskeletal- No finger Clubbing. Strength grossly normal   Lymphatic-No Palpable cervical, axillary,Inguinal lymphadenopathy   Psychiatric - normal effect,Orientation  Rt Dorsalis pedis pulses-palpable    Lt Dorsalis pedis pulses- palpable   Rt Posterior tibial pulses -palpable   Left posterior tibial pulses -palpable   Miscellaneous -  no renal bruit  Blood pressure 112/74, pulse 83, temperature 98.4 °F (36.9 °C), temperature source Oral, height 5' 7" (1.702 m), weight 79.2 kg (174 lb 8 oz), SpO2 96 %.      Investigations  Lab and Imaging have been reviewed in epic.    Review of Medicine tests    EKG- I had independently reviewed the EKG from--  It was reported to be showing     Review of clinical lab tests-Date--1/9/2018 - Creatinine-1.0  Date--1/9/2018 Hemoglobin--N  Platelet count--n      Review of old records- Was done and information gathered regards to events leading to surgery and health conditions of significance in the perioperative period.    Outpatient Subjective & Objective   "
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
## 2018-01-12 NOTE — LETTER
January 12, 2018      Saleem Noguera MD  [address] [facility] [address]           [facility] - Pre Op Consult  [address] [facility] [address]  Phone: [phone]          Patient: Van Jones   MR Number: [id number]   YOB: 1965   Date of Visit: 1/12/2018       Dear Dr. Saleem Noguera:    Thank you for referring Van Jones to me for evaluation. Attached you will find relevant portions of my assessment and plan of care.    If you have questions, please do not hesitate to call me. I look forward to following Van Jones along with you.    Sincerely,    Kathy Hahn MD    Enclosure  CC:  MD Berry Reis MD Karen A. Toribio, MD    If you would like to receive this communication electronically, please contact [email or web address] or [phone] to request more information on [facility] access.    For providers and/or their staff who would like to refer a patient to [facility], please contact us through our one-stop-shop provider referral line, [facility], at [phone].    If you feel you have received this communication in error or would no longer like to receive these types of communications, please e-mail [email or web address]

## 2018-01-12 NOTE — PATIENT INSTRUCTIONS
Your surgery has been scheduled for:___Thurs 1/25___     You should report to:  _____[facility], located on the [place] side of the first floor of the [facility] ([phone])  ___X___The Second Floor [facility] Center, located on the [facility] side of the Second floor of the [facility] ([phone])  ______3rd Floor [facility] located on the [facility] side of the [facility] [phone]    Please Note  -Tell your doctors if you take asprin, products containing aspirin, herbal medications or blood thinners, such as Coumadin, Ticlid, or Plavix. (Consult your provider regarding holding or stopping before surgery).  -Arrange for someone to drive you home following surgery. You will not be allowed to leave the surgical facility alone or drive yourself home following sedation and anesthesia.      Outpatient Encounter Prescriptions as of 1/12/2018   Medication Sig Note Dispense Refill    aspirin (ECOTRIN) 81 MG EC tablet Take 81 mg by mouth once daily. 12/29/2017: Stop 7days prior to surgery      buPROPion (WELLBUTRIN XL) 300 MG 24 hr tablet Take 300 mg by mouth once daily.  12/28/2017: TAKE AS SCHEDULED      DICLOFENAC SODIUM/MISOPROSTOL (ARTHROTEC 75 ORAL) Take 1 tablet by mouth 2 (two) times daily. 12/28/2017: STOP 7 DAYS PRIOR TO SURGERY      multivitamin capsule Take 1 capsule by mouth once daily. 12/28/2017: STOP 7 DAYS PRIOR TO SURGERY      pilocarpine (SALAGEN) 5 MG Tab Take 1 tablet (5 mg total) by mouth 2 (two) times daily. 12/29/2017: take as scheduled 60 tablet 11    rosuvastatin (CRESTOR) 10 MG tablet Take 10 mg by mouth every evening.  12/28/2017: TAKE AS SCHEDULED       No facility-administered encounter medications on file as of 1/12/2018.          Please review the instructions regarding your above listed medications.    Before Surgery  -Stop taking all herbal medications 14 days prior to surgery  -Stop taking asprin, products  containing asprin 7 days before surgery  -Stop taking blood thinners   days before surgery  -Refrain from drinking alcoholic beverages for 24 hours before and after surgery  -Stop or limit smoking   days before surgery    Night before Surgery  -DO NOT EAT OR DRINK ANYTHING AFTER MIDNIGHT, INCLUDING GUM, HARD CANDY, MINTS, OR CHEWING TOBACCO  -Take a shower or bath (shower is recommended). Bathe with Hibiciens soap or an antibacterial soap from the neck down. If not supplied by your surgeon, Hibiciens soap will need to be purchased over the counter in the pharmacy. Rinse soap off thoroughly.  -Shampoo your hair with your regular shampoo    The Day of Surgery  -Take another bath or shower with Hibiciens or any antibacterial soap, to reduce the chance of infection.  -Take heart and blood pressure medications with a small sip of water, as advised by the perioperative team.  -Do not take fluid pills  -You may brush your teeth and rinse your mouth, but do not swallow any additional water.  -Do not apply perfumes, powder, body lotions, or deodorant on the day of surgery.  -Nail polish should be removed  -Do not wear makeup or moisturizer  -Wear comfortable clothes, such as a button front shirt and loose fitting pants.  -Leave all jewelry, including body piercings, and valuables at home.  -Bring any devices you will need after surgery such as crutches or canes.  -If you have sleep apnea, please bring your CPAP machine    In the event of your physical conditions including the onset of a cold or respiratory illness, or if you have to delay or cancel your surgery, please notify your surgeon.     If you have any questions or concerns, please don't hesitate to call.    Sincerely,    Monique [phone]

## 2018-01-13 NOTE — PROGRESS NOTES
The patient returns for pre-operative visit    He does not have severe myelopathic symptoms.    Today I reviewed his MRI again that demonstrates severe C3/4 stenosis with evidence of cord signal abnormality.    Today we discussed the surgical plan which is a C3/4 cervical disc replacement    I had a sit down discussion with the patient regarding cervical myelopathy. I discussed the known stepwise degeneration of cervical myelopathy. I discussed the risks and benefits of decompressive surgery, including the concept that surgery would be done to prevent further neurological injury/degeneration rather than to ameliorate any existing deficits. It was also explained in detail that while the surgical plan is for disc replacement he may require an ACDF upon intra-operative evaluation. In addition the risk of subsequent revision surgery with conversion to ACDF was also discussed in detail.     Stand surgical risks and complications were discussed including but not limited to the risks of anesthetic complications, infection, wound healing complications, pain, stiffness, DVT, pulmonary embolism, perioperative medical risks (cardiac, pulmonary, renal, neurologic), durotomy, paralysis and death among others were discussed     Consents were signed. Patient will be contacted the day prior to surgery to further instructions.    I spent 10 minutes with the patient of which greater than 1/2 the time was devoted to counciling the patient regarding treatment options.

## 2018-01-23 ENCOUNTER — HOSPITAL ENCOUNTER (OUTPATIENT)
Facility: HOSPITAL | Age: 53
LOS: 1 days | Discharge: HOME | End: 2018-01-24
Attending: ORTHOPAEDIC SURGERY | Admitting: ORTHOPAEDIC SURGERY
Payer: MEDICARE

## 2018-01-23 DIAGNOSIS — G95.9 CERVICAL MYELOPATHY: Primary | ICD-10-CM

## 2018-01-23 PROCEDURE — 25000003 PHARM REV CODE 250

## 2018-01-23 PROCEDURE — 94761 N-INVAS EAR/PLS OXIMETRY MLT: CPT

## 2018-01-23 PROCEDURE — 25000003 PHARM REV CODE 250: Performed by: ORTHOPAEDIC SURGERY

## 2018-01-23 PROCEDURE — 63600175 PHARM REV CODE 636 W HCPCS: Performed by: NURSE ANESTHETIST, CERTIFIED REGISTERED

## 2018-01-23 PROCEDURE — 63600175 PHARM REV CODE 636 W HCPCS: Performed by: STUDENT IN AN ORGANIZED HEALTH CARE EDUCATION/TRAINING PROGRAM

## 2018-01-23 PROCEDURE — C1713 ANCHOR/SCREW BN/BN,TIS/BN: HCPCS | Performed by: ORTHOPAEDIC SURGERY

## 2018-01-23 PROCEDURE — 36000710: Performed by: ORTHOPAEDIC SURGERY

## 2018-01-23 PROCEDURE — 94799 UNLISTED PULMONARY SVC/PX: CPT

## 2018-01-23 PROCEDURE — 37000008 HC ANESTHESIA 1ST 15 MINUTES: Performed by: ORTHOPAEDIC SURGERY

## 2018-01-23 PROCEDURE — 71000033 HC RECOVERY, INTIAL HOUR: Performed by: ORTHOPAEDIC SURGERY

## 2018-01-23 PROCEDURE — D9220A PRA ANESTHESIA: Mod: CRNA,,, | Performed by: NURSE ANESTHETIST, CERTIFIED REGISTERED

## 2018-01-23 PROCEDURE — 25000003 PHARM REV CODE 250: Performed by: NURSE ANESTHETIST, CERTIFIED REGISTERED

## 2018-01-23 PROCEDURE — 22856 TOT DISC ARTHRP 1NTRSPC CRV: CPT | Mod: ,,, | Performed by: ORTHOPAEDIC SURGERY

## 2018-01-23 PROCEDURE — 63600175 PHARM REV CODE 636 W HCPCS: Performed by: ORTHOPAEDIC SURGERY

## 2018-01-23 PROCEDURE — 27000221 HC OXYGEN, UP TO 24 HOURS

## 2018-01-23 PROCEDURE — 36000711: Performed by: ORTHOPAEDIC SURGERY

## 2018-01-23 PROCEDURE — 25000003 PHARM REV CODE 250: Performed by: STUDENT IN AN ORGANIZED HEALTH CARE EDUCATION/TRAINING PROGRAM

## 2018-01-23 PROCEDURE — 63600175 PHARM REV CODE 636 W HCPCS: Performed by: ANESTHESIOLOGY

## 2018-01-23 PROCEDURE — 71000039 HC RECOVERY, EACH ADD'L HOUR: Performed by: ORTHOPAEDIC SURGERY

## 2018-01-23 PROCEDURE — 37000009 HC ANESTHESIA EA ADD 15 MINS: Performed by: ORTHOPAEDIC SURGERY

## 2018-01-23 PROCEDURE — 27201037 HC PRESSURE MONITORING SET UP

## 2018-01-23 PROCEDURE — D9220A PRA ANESTHESIA: Mod: ANES,,, | Performed by: ANESTHESIOLOGY

## 2018-01-23 PROCEDURE — 27800903 OPTIME MED/SURG SUP & DEVICES OTHER IMPLANTS: Performed by: ORTHOPAEDIC SURGERY

## 2018-01-23 PROCEDURE — 63600175 PHARM REV CODE 636 W HCPCS

## 2018-01-23 PROCEDURE — 27201423 OPTIME MED/SURG SUP & DEVICES STERILE SUPPLY: Performed by: ORTHOPAEDIC SURGERY

## 2018-01-23 PROCEDURE — C1729 CATH, DRAINAGE: HCPCS | Performed by: ORTHOPAEDIC SURGERY

## 2018-01-23 DEVICE — DISC CERVICAL MOBI-C 15X17X5MM: Type: IMPLANTABLE DEVICE | Site: SPINE CERVICAL | Status: FUNCTIONAL

## 2018-01-23 RX ORDER — BACITRACIN 50000 [IU]/1
INJECTION, POWDER, FOR SOLUTION INTRAMUSCULAR
Status: DISCONTINUED | OUTPATIENT
Start: 2018-01-23 | End: 2018-01-23 | Stop reason: HOSPADM

## 2018-01-23 RX ORDER — ACETAMINOPHEN 325 MG/1
650 TABLET ORAL EVERY 8 HOURS PRN
Status: DISCONTINUED | OUTPATIENT
Start: 2018-01-23 | End: 2018-01-24 | Stop reason: HOSPADM

## 2018-01-23 RX ORDER — SODIUM CHLORIDE 9 MG/ML
INJECTION, SOLUTION INTRAVENOUS CONTINUOUS
Status: DISCONTINUED | OUTPATIENT
Start: 2018-01-23 | End: 2018-01-23

## 2018-01-23 RX ORDER — MIDAZOLAM HYDROCHLORIDE 1 MG/ML
INJECTION, SOLUTION INTRAMUSCULAR; INTRAVENOUS
Status: DISCONTINUED | OUTPATIENT
Start: 2018-01-23 | End: 2018-01-23

## 2018-01-23 RX ORDER — LIDOCAINE HYDROCHLORIDE 10 MG/ML
1 INJECTION, SOLUTION EPIDURAL; INFILTRATION; INTRACAUDAL; PERINEURAL ONCE
Status: DISCONTINUED | OUTPATIENT
Start: 2018-01-23 | End: 2018-01-23

## 2018-01-23 RX ORDER — CEFAZOLIN SODIUM 1 G/50ML
1 SOLUTION INTRAVENOUS
Status: COMPLETED | OUTPATIENT
Start: 2018-01-23 | End: 2018-01-24

## 2018-01-23 RX ORDER — CEFAZOLIN SODIUM 1 G/3ML
1 INJECTION, POWDER, FOR SOLUTION INTRAMUSCULAR; INTRAVENOUS
Status: DISCONTINUED | OUTPATIENT
Start: 2018-01-23 | End: 2018-01-23

## 2018-01-23 RX ORDER — ACETAMINOPHEN 10 MG/ML
INJECTION, SOLUTION INTRAVENOUS
Status: DISCONTINUED | OUTPATIENT
Start: 2018-01-23 | End: 2018-01-23

## 2018-01-23 RX ORDER — PROPOFOL 10 MG/ML
VIAL (ML) INTRAVENOUS CONTINUOUS PRN
Status: DISCONTINUED | OUTPATIENT
Start: 2018-01-23 | End: 2018-01-23

## 2018-01-23 RX ORDER — PROPOFOL 10 MG/ML
VIAL (ML) INTRAVENOUS
Status: DISCONTINUED | OUTPATIENT
Start: 2018-01-23 | End: 2018-01-23

## 2018-01-23 RX ORDER — ONDANSETRON 2 MG/ML
INJECTION INTRAMUSCULAR; INTRAVENOUS
Status: DISCONTINUED | OUTPATIENT
Start: 2018-01-23 | End: 2018-01-23

## 2018-01-23 RX ORDER — OXYCODONE HYDROCHLORIDE 5 MG/1
10 TABLET ORAL EVERY 4 HOURS PRN
Status: DISCONTINUED | OUTPATIENT
Start: 2018-01-23 | End: 2018-01-24 | Stop reason: HOSPADM

## 2018-01-23 RX ORDER — OXYCODONE HYDROCHLORIDE 5 MG/1
15 TABLET ORAL EVERY 4 HOURS PRN
Status: DISCONTINUED | OUTPATIENT
Start: 2018-01-23 | End: 2018-01-24 | Stop reason: HOSPADM

## 2018-01-23 RX ORDER — DEXAMETHASONE SODIUM PHOSPHATE 100 MG/10ML
INJECTION INTRAMUSCULAR; INTRAVENOUS
Status: COMPLETED
Start: 2018-01-23 | End: 2018-01-23

## 2018-01-23 RX ORDER — PHENYLEPHRINE HYDROCHLORIDE 10 MG/ML
INJECTION INTRAVENOUS
Status: DISCONTINUED | OUTPATIENT
Start: 2018-01-23 | End: 2018-01-23

## 2018-01-23 RX ORDER — SODIUM CHLORIDE 0.9 % (FLUSH) 0.9 %
3 SYRINGE (ML) INJECTION
Status: DISCONTINUED | OUTPATIENT
Start: 2018-01-23 | End: 2018-01-23

## 2018-01-23 RX ORDER — ROSUVASTATIN CALCIUM 10 MG/1
10 TABLET, COATED ORAL NIGHTLY
Status: DISCONTINUED | OUTPATIENT
Start: 2018-01-23 | End: 2018-01-24 | Stop reason: HOSPADM

## 2018-01-23 RX ORDER — SUCCINYLCHOLINE CHLORIDE 20 MG/ML
INJECTION INTRAMUSCULAR; INTRAVENOUS
Status: DISCONTINUED | OUTPATIENT
Start: 2018-01-23 | End: 2018-01-23

## 2018-01-23 RX ORDER — HALOPERIDOL 5 MG/ML
1 INJECTION INTRAMUSCULAR ONCE AS NEEDED
Status: DISCONTINUED | OUTPATIENT
Start: 2018-01-23 | End: 2018-01-23 | Stop reason: HOSPADM

## 2018-01-23 RX ORDER — GLYCOPYRROLATE 0.2 MG/ML
INJECTION INTRAMUSCULAR; INTRAVENOUS
Status: DISCONTINUED | OUTPATIENT
Start: 2018-01-23 | End: 2018-01-23

## 2018-01-23 RX ORDER — METHOCARBAMOL 750 MG/1
750 TABLET, FILM COATED ORAL 4 TIMES DAILY
Status: DISCONTINUED | OUTPATIENT
Start: 2018-01-23 | End: 2018-01-24 | Stop reason: HOSPADM

## 2018-01-23 RX ORDER — LIDOCAINE HYDROCHLORIDE AND EPINEPHRINE 10; 10 MG/ML; UG/ML
INJECTION, SOLUTION INFILTRATION; PERINEURAL
Status: DISCONTINUED | OUTPATIENT
Start: 2018-01-23 | End: 2018-01-23 | Stop reason: HOSPADM

## 2018-01-23 RX ORDER — CEFAZOLIN SODIUM 1 G/3ML
INJECTION, POWDER, FOR SOLUTION INTRAMUSCULAR; INTRAVENOUS
Status: DISCONTINUED | OUTPATIENT
Start: 2018-01-23 | End: 2018-01-23

## 2018-01-23 RX ORDER — OXYCODONE AND ACETAMINOPHEN 10; 325 MG/1; MG/1
1 TABLET ORAL EVERY 4 HOURS PRN
Qty: 90 TABLET | Refills: 0 | Status: SHIPPED | OUTPATIENT
Start: 2018-01-23 | End: 2018-03-28

## 2018-01-23 RX ORDER — ONDANSETRON 8 MG/1
8 TABLET, ORALLY DISINTEGRATING ORAL EVERY 8 HOURS PRN
Status: DISCONTINUED | OUTPATIENT
Start: 2018-01-23 | End: 2018-01-24 | Stop reason: HOSPADM

## 2018-01-23 RX ORDER — DEXAMETHASONE SODIUM PHOSPHATE 100 MG/10ML
10 INJECTION INTRAMUSCULAR; INTRAVENOUS EVERY 6 HOURS
Status: COMPLETED | OUTPATIENT
Start: 2018-01-23 | End: 2018-01-23

## 2018-01-23 RX ORDER — METHOCARBAMOL 750 MG/1
750 TABLET, FILM COATED ORAL 4 TIMES DAILY
Qty: 60 TABLET | Refills: 0 | Status: SHIPPED | OUTPATIENT
Start: 2018-01-23 | End: 2018-02-02

## 2018-01-23 RX ORDER — HYDROMORPHONE HYDROCHLORIDE 1 MG/ML
1 INJECTION, SOLUTION INTRAMUSCULAR; INTRAVENOUS; SUBCUTANEOUS EVERY 4 HOURS PRN
Status: DISCONTINUED | OUTPATIENT
Start: 2018-01-23 | End: 2018-01-24 | Stop reason: HOSPADM

## 2018-01-23 RX ORDER — PROMETHAZINE HYDROCHLORIDE 12.5 MG/1
12.5 TABLET ORAL EVERY 4 HOURS PRN
Qty: 60 TABLET | Refills: 0 | Status: SHIPPED | OUTPATIENT
Start: 2018-01-23 | End: 2018-03-28

## 2018-01-23 RX ORDER — LIDOCAINE HCL/PF 100 MG/5ML
SYRINGE (ML) INTRAVENOUS
Status: DISCONTINUED | OUTPATIENT
Start: 2018-01-23 | End: 2018-01-23

## 2018-01-23 RX ORDER — MEPERIDINE HYDROCHLORIDE 50 MG/ML
25 INJECTION INTRAMUSCULAR; INTRAVENOUS; SUBCUTANEOUS EVERY 10 MIN PRN
Status: DISCONTINUED | OUTPATIENT
Start: 2018-01-23 | End: 2018-01-23 | Stop reason: HOSPADM

## 2018-01-23 RX ORDER — OXYCODONE HYDROCHLORIDE 5 MG/1
TABLET ORAL
Status: COMPLETED
Start: 2018-01-23 | End: 2018-01-23

## 2018-01-23 RX ORDER — RAMELTEON 8 MG/1
8 TABLET ORAL NIGHTLY PRN
Status: DISCONTINUED | OUTPATIENT
Start: 2018-01-23 | End: 2018-01-24 | Stop reason: HOSPADM

## 2018-01-23 RX ORDER — SODIUM CHLORIDE 0.9 % (FLUSH) 0.9 %
3 SYRINGE (ML) INJECTION
Status: DISCONTINUED | OUTPATIENT
Start: 2018-01-23 | End: 2018-01-24 | Stop reason: HOSPADM

## 2018-01-23 RX ORDER — OXYCODONE HYDROCHLORIDE 5 MG/1
5 TABLET ORAL EVERY 4 HOURS PRN
Status: DISCONTINUED | OUTPATIENT
Start: 2018-01-23 | End: 2018-01-24 | Stop reason: HOSPADM

## 2018-01-23 RX ORDER — ROCURONIUM BROMIDE 10 MG/ML
INJECTION, SOLUTION INTRAVENOUS
Status: DISCONTINUED | OUTPATIENT
Start: 2018-01-23 | End: 2018-01-23

## 2018-01-23 RX ORDER — DEXAMETHASONE SODIUM PHOSPHATE 4 MG/ML
INJECTION, SOLUTION INTRA-ARTICULAR; INTRALESIONAL; INTRAMUSCULAR; INTRAVENOUS; SOFT TISSUE
Status: DISCONTINUED | OUTPATIENT
Start: 2018-01-23 | End: 2018-01-23

## 2018-01-23 RX ORDER — SODIUM CHLORIDE 9 MG/ML
INJECTION, SOLUTION INTRAVENOUS CONTINUOUS
Status: DISCONTINUED | OUTPATIENT
Start: 2018-01-23 | End: 2018-01-24 | Stop reason: HOSPADM

## 2018-01-23 RX ORDER — PILOCARPINE HYDROCHLORIDE 5 MG/1
5 TABLET, FILM COATED ORAL 2 TIMES DAILY
Status: DISCONTINUED | OUTPATIENT
Start: 2018-01-23 | End: 2018-01-24 | Stop reason: HOSPADM

## 2018-01-23 RX ORDER — DOCUSATE SODIUM 100 MG/1
100 CAPSULE, LIQUID FILLED ORAL 2 TIMES DAILY
Qty: 60 CAPSULE | Refills: 0 | Status: SHIPPED | OUTPATIENT
Start: 2018-01-23 | End: 2018-02-20

## 2018-01-23 RX ORDER — FENTANYL CITRATE 50 UG/ML
25 INJECTION, SOLUTION INTRAMUSCULAR; INTRAVENOUS EVERY 5 MIN PRN
Status: DISCONTINUED | OUTPATIENT
Start: 2018-01-23 | End: 2018-01-23 | Stop reason: HOSPADM

## 2018-01-23 RX ORDER — MUPIROCIN 20 MG/G
OINTMENT TOPICAL
Status: DISCONTINUED | OUTPATIENT
Start: 2018-01-23 | End: 2018-01-23

## 2018-01-23 RX ORDER — FENTANYL CITRATE 50 UG/ML
INJECTION, SOLUTION INTRAMUSCULAR; INTRAVENOUS
Status: DISCONTINUED | OUTPATIENT
Start: 2018-01-23 | End: 2018-01-23

## 2018-01-23 RX ORDER — DIPHENHYDRAMINE HYDROCHLORIDE 50 MG/ML
25 INJECTION INTRAMUSCULAR; INTRAVENOUS EVERY 4 HOURS PRN
Status: DISCONTINUED | OUTPATIENT
Start: 2018-01-23 | End: 2018-01-24 | Stop reason: HOSPADM

## 2018-01-23 RX ORDER — BUPROPION HYDROCHLORIDE 150 MG/1
300 TABLET ORAL DAILY
Status: DISCONTINUED | OUTPATIENT
Start: 2018-01-23 | End: 2018-01-24 | Stop reason: HOSPADM

## 2018-01-23 RX ADMIN — CEFAZOLIN 2 G: 330 INJECTION, POWDER, FOR SOLUTION INTRAMUSCULAR; INTRAVENOUS at 07:01

## 2018-01-23 RX ADMIN — PHENYLEPHRINE HYDROCHLORIDE 0.25 MCG/KG/MIN: 10 INJECTION INTRAVENOUS at 07:01

## 2018-01-23 RX ADMIN — GLYCOPYRROLATE 0.2 MG: 0.2 INJECTION, SOLUTION INTRAMUSCULAR; INTRAVENOUS at 07:01

## 2018-01-23 RX ADMIN — DEXAMETHASONE SODIUM PHOSPHATE 10 MG: 10 INJECTION, SOLUTION INTRAMUSCULAR; INTRAVENOUS at 12:01

## 2018-01-23 RX ADMIN — DEXAMETHASONE SODIUM PHOSPHATE 10 MG: 10 INJECTION, SOLUTION INTRAMUSCULAR; INTRAVENOUS at 05:01

## 2018-01-23 RX ADMIN — PROPOFOL 150 MCG/KG/MIN: 10 INJECTION, EMULSION INTRAVENOUS at 07:01

## 2018-01-23 RX ADMIN — ACETAMINOPHEN 1000 MG: 10 INJECTION, SOLUTION INTRAVENOUS at 07:01

## 2018-01-23 RX ADMIN — PROPOFOL 200 MG: 10 INJECTION, EMULSION INTRAVENOUS at 07:01

## 2018-01-23 RX ADMIN — FENTANYL CITRATE 25 MCG: 50 INJECTION, SOLUTION INTRAMUSCULAR; INTRAVENOUS at 11:01

## 2018-01-23 RX ADMIN — FENTANYL CITRATE 100 MCG: 50 INJECTION, SOLUTION INTRAMUSCULAR; INTRAVENOUS at 07:01

## 2018-01-23 RX ADMIN — PHENYLEPHRINE HYDROCHLORIDE 100 MCG: 10 INJECTION INTRAVENOUS at 07:01

## 2018-01-23 RX ADMIN — SUCCINYLCHOLINE CHLORIDE 120 MG: 20 INJECTION, SOLUTION INTRAMUSCULAR; INTRAVENOUS at 07:01

## 2018-01-23 RX ADMIN — ROCURONIUM BROMIDE 5 MG: 10 INJECTION, SOLUTION INTRAVENOUS at 07:01

## 2018-01-23 RX ADMIN — OXYCODONE HYDROCHLORIDE 15 MG: 5 TABLET ORAL at 09:01

## 2018-01-23 RX ADMIN — CEFAZOLIN SODIUM 1 G: 1 SOLUTION INTRAVENOUS at 11:01

## 2018-01-23 RX ADMIN — MIDAZOLAM HYDROCHLORIDE 2 MG: 1 INJECTION, SOLUTION INTRAMUSCULAR; INTRAVENOUS at 07:01

## 2018-01-23 RX ADMIN — METHOCARBAMOL 750 MG: 750 TABLET ORAL at 05:01

## 2018-01-23 RX ADMIN — ROSUVASTATIN CALCIUM 10 MG: 10 TABLET, FILM COATED ORAL at 09:01

## 2018-01-23 RX ADMIN — METHOCARBAMOL 750 MG: 750 TABLET ORAL at 12:01

## 2018-01-23 RX ADMIN — ONDANSETRON 4 MG: 2 INJECTION INTRAMUSCULAR; INTRAVENOUS at 08:01

## 2018-01-23 RX ADMIN — SODIUM CHLORIDE: 0.9 INJECTION, SOLUTION INTRAVENOUS at 11:01

## 2018-01-23 RX ADMIN — MUPIROCIN: 20 OINTMENT TOPICAL at 05:01

## 2018-01-23 RX ADMIN — DEXAMETHASONE SODIUM PHOSPHATE 12 MG: 4 INJECTION, SOLUTION INTRAMUSCULAR; INTRAVENOUS at 07:01

## 2018-01-23 RX ADMIN — OXYCODONE HYDROCHLORIDE 10 MG: 5 TABLET ORAL at 09:01

## 2018-01-23 RX ADMIN — DEXAMETHASONE SODIUM PHOSPHATE 10 MG: 10 INJECTION, SOLUTION INTRAMUSCULAR; INTRAVENOUS at 11:01

## 2018-01-23 RX ADMIN — REMIFENTANIL HYDROCHLORIDE 0.1 MCG/KG/MIN: 1 INJECTION, POWDER, LYOPHILIZED, FOR SOLUTION INTRAVENOUS at 07:01

## 2018-01-23 RX ADMIN — LIDOCAINE HYDROCHLORIDE 75 MG: 20 INJECTION, SOLUTION INTRAVENOUS at 07:01

## 2018-01-23 RX ADMIN — PILOCARPINE HYDROCHLORIDE 5 MG: 5 TABLET, FILM COATED ORAL at 10:01

## 2018-01-23 RX ADMIN — METHOCARBAMOL 750 MG: 750 TABLET ORAL at 11:01

## 2018-01-23 RX ADMIN — CEFAZOLIN SODIUM 1 G: 1 SOLUTION INTRAVENOUS at 02:01

## 2018-01-23 RX ADMIN — FENTANYL CITRATE 25 MCG: 50 INJECTION, SOLUTION INTRAMUSCULAR; INTRAVENOUS at 10:01

## 2018-01-23 RX ADMIN — SODIUM CHLORIDE 1000 ML: 0.9 INJECTION, SOLUTION INTRAVENOUS at 05:01

## 2018-01-23 NOTE — PLAN OF CARE
Pt is AAOx4, VSS. Patient states pain is tolerable. No N&V. SCD's in place throughout duration in PACU. Pt has dressing clean/dry/intact, LAURENCE drain in place. Neuro checks WNL. Pt denies numbness/tingling. Pt transferred to room 631, report given to AGGIE Treviño.

## 2018-01-23 NOTE — NURSING TRANSFER
Nursing Transfer Note      1/23/2018     Transfer To: 631    Transfer via stretcher    Transfer with n/a    Transported by ROSAMARIA Zapata    Medicines sent: NS infusing    Chart send with patient: Yes    Notified: parents    Patient reassessed at: 1300 1/23/18
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
## 2018-01-23 NOTE — TRANSFER OF CARE
"Anesthesia Transfer of Care Note    Patient: Van Jones    Procedure(s) Performed: Procedure(s) (LRB):  CERVICAL TOTAL DISC REPLACEMENT, C3/4  (N/A)    Patient location: PACU    Anesthesia Type: general    Transport from OR: Transported from OR on 6-10 L/min O2 by face mask with adequate spontaneous ventilation    Post pain: adequate analgesia    Post assessment: no apparent anesthetic complications and tolerated procedure well    Post vital signs: stable    Level of consciousness: sedated    Nausea/Vomiting: no nausea/vomiting    Complications: none    Transfer of care protocol was followed      Last vitals:   Visit Vitals  /74   Pulse 81   Temp 35.9 °C (96.7 °F) (Axillary)   Resp 18   Ht 5' 7" (1.702 m)   Wt 76.2 kg (168 lb)   SpO2 100%   BMI 26.31 kg/m²     "

## 2018-01-23 NOTE — INTERVAL H&P NOTE
The patient has been examined and the H&P has been reviewed:    I concur with the findings and no changes have occurred since H&P was written.    Anesthesia/Surgery risks, benefits and alternative options discussed and understood by patient/family.          Active Hospital Problems    Diagnosis  POA    Cervical myelopathy [G95.9]  Yes      Resolved Hospital Problems    Diagnosis Date Resolved POA   No resolved problems to display.

## 2018-01-23 NOTE — OP NOTE
DATE OF PROCEDURE:  01/23/2018.    SURGEON:  Saleem Noguera M.D.    FIRST ASSISTANT SURGEON:  Theodore Silvestre M.D. (RES), PGY-3.    PREOPERATIVE DIAGNOSIS:  Cervical myeloradiculopathy secondary to C3-C4 disk   herniation.    POSTOPERATIVE DIAGNOSIS:  Cervical myeloradiculopathy secondary to C3-4 disk   herniation.    PROCEDURES PERFORMED:  Total cervical disk arthroplasty including decompression   of neurological elements, C3-C4.    ANESTHESIA:  General endotracheal anesthesia.    ESTIMATED BLOOD LOSS:  25 mL.    IMPLANTS USED:  LDR Mobi-C 17 x 15 x 5 mm prosthesis.    DRAINS:  One deep.    SPONGE AND NEEDLE COUNT:  Correct x2.    COMPLICATIONS:  None.    FINDINGS:  None.    NEUROLOGICAL MONITORING NOTES:  Motor-evoked potentials, somatosensory-evoked   potentials, free-running EMG as well as recurrent laryngeal nerve monitoring.    Please note that there were no changes to stable and reliable bilateral upper   and lower extremity MEPs and SSEPs throughout the entire procedure and no   significant EMG activity.  There was no significant recurrent laryngeal nerve   activity.    REASON FOR OPERATION AND BRIEF HISTORY AND PHYSICAL:  Van Jones is a   52-year-old male with symptomatic cervical myeloradiculopathy secondary to   congenital stenosis and a large left-sided C3-C4 disk herniation.  He has failed   conservative management and is here for surgery today.    DESCRIPTION OF INFORMED CONSENT:  Please see my last clinic note for full a   description of the informed consent process I had with the patient.    DESCRIPTION OF PROCEDURE:  The patient was met in the preoperative area where   his right-sided neck was marked in the skin crease and all final questions were   answered.  Sequential compression devices were placed on the patient's bilateral   calves and run throughout the case.  At this point, the patient was brought to   the Operating Room where general endotracheal anesthesia was induced.  The    patient was positioned over a shoulder roll with the neck in gentle   hyperextension.  AP and lateral radiographs were taken, demonstrating an ability   to visualize our desired level.  At this point, the patient's anterior cervical   spine was prepped and draped in normal sterile fashion.    A full timeout was then called identifying the patient, the procedural site and   levels; the availability of all instruments and implants; and no specific   nursing, surgical, anesthetic or neurological monitoring concerns.  Finding it   was safe to proceed with surgery, the patient was given a weight-appropriate   dose of Ancef by the Anesthesia Service and I infiltrated the planned incision   with 6 mL of 1% lidocaine with epinephrine.    I next made a transverse right-sided anterior cervical incision and performed a   standard Viramontes-Rodriguez approach to the anterior cervical spine.  A vertebral   body was identified and marked and this was found to be C3 on lateral   radiographs.  I then finalized my exposure.  I then placed Breeding distraction   pins at C3 and C4 and performed a subtotal C3-C4 diskectomy with a combination   of disk knife as well as straight and angled curettes as well as a Kerrison   punch.  A PLL disk space distraction device was then brought in and the   posterior aspect of the disk space was visualized.  Under microscopic   visualization, it was drilled down to reveal the PLL.  I then took down the PLL   in the standard fashion.  At the conclusion of my neurological decompression, I   could see the dura from uncinate process to uncinate process with no residual   palpable disk herniations or foraminal stenosis.  The wound was then thoroughly   irrigated.  Hemostasis was finalized with FloSeal and patties.  The implant was   then trialed and a 17 x 15 x 5 was found to be adequate.  This was impacted in a   standard fashion.  AP and lateral radiographs confirmed adequate position as   well as correct  level for the prosthesis.  The [place] pins were removed and the   pin sites were bone waxed.  The wound was then finally irrigated.  Hemostasis   was finalized with bipolar electrocautery.  A deep drain was then placed.  The   wound was then closed in layers using 3-0 Vicryl for the platysma followed by   4-0 and 5-0 Monocryl.  A soft dressing was then placed.  The drain was secured   in place with 2-0 silk suture.  The patient was then extubated and transferred   to the Recovery Room in stable condition.

## 2018-01-23 NOTE — NURSING TRANSFER
Nursing Transfer Note      1/23/2018     Transfer From: PACU    Transfer via stretcher    Transfer with N/A    Transported by Transport    Medicines sent: Yes    Chart send with patient: Yes    Notified: friend    Patient reassessed at: 1330 on 1/23/2018    Upon arrival to floor: patient oriented to room, call bell in reach and bed in lowest position

## 2018-01-23 NOTE — DISCHARGE INSTRUCTIONS
DR. COSMO SANDOVAL  POSTOPERATIVE CERVICAL DISC ARTHROPLASTY INSTRUCTIONS  Antibiotics: You do not need additional antibiotics at home.  NSAIDs: You will be prescribed indomethacin 25 mg three times a day for 3 weeks after  surgery. Please take this medication as prescribed as it will help to inhibit bone growth  across the disc replacement.  Wound Care: You may remove your dressing and shower 5 days after surgery. Until  then please keep your wound clean and dry. Sponge baths are acceptable. Do not go in  a pool or hot tub until seen in clinic. Please leave the small steri-strips covering your  wound in place until they fall off naturally (2 weeks). You may notice clear suture ends  hanging from the sides of your incense after the steri-strips are removed, it is ok to clip  these with scissors.  Cervical Collar: You will be given a soft collar for comfort, please remove this and  perform your neck range of motion exercises at least 10 times daily. The most important  range of motion are chin to chest / looking up and looking side to side. You should do 10  repetitions each time. You may stop using the collar whenever you are comfortable.  Pain: You will be given a prescription for pain medicines before discharge. If you pain is  not adequately controlled with these medications, please call [phone].  Difficulty Swallowing: This is very common in the postoperative period. You may find it  easier to eat a pureed diet for the first 1-2 weeks after surgery. Ice chips and menthol  cough drops may also be soothing.  Difficulty Breathing: This is uncommon after surgery and may represent dangerous  swelling in your neck. If you experience difficulty breathing please call [phone] immediately.  Infection: Signs of infection include increasing wound drainage and redness around the  wound, as well as a temperature over 101.5 degrees. It is unnecessary to take your  temperature on a routine basis. Please call the above  number if you are concerned  about an infection.  Driving and Work: It is ok to return to driving and work as long as you are not taking  narcotic pain medications or wearing your cervical collar. Please do not lift over 5  pounds or participate in exercise or sports until cleared by Dr. Noguera.  Deep Venous Thrombosis (Blood Clots): Symptoms include swelling in the legs and  shortness of breath. Please call the office or proceed to the nearest emergency room if  you have any of these symptoms.  Physical Therapy: The best physical therapy after surgery is the range of motion  exercises detailed above and walking. Please try to walk as much as possible.  Follow-up: You will be scheduled for a follow-up appointment in 2-3 weeks.  Questions: During business hours please call [phone] for routine questions. For  after hours questions please call [phone] and ask to speak with the orthopaedic  resident on call.

## 2018-01-24 VITALS
HEART RATE: 102 BPM | RESPIRATION RATE: 16 BRPM | BODY MASS INDEX: 26.37 KG/M2 | TEMPERATURE: 98 F | HEIGHT: 67 IN | SYSTOLIC BLOOD PRESSURE: 130 MMHG | DIASTOLIC BLOOD PRESSURE: 67 MMHG | WEIGHT: 168 LBS | OXYGEN SATURATION: 96 %

## 2018-01-24 PROCEDURE — 63600175 PHARM REV CODE 636 W HCPCS: Performed by: STUDENT IN AN ORGANIZED HEALTH CARE EDUCATION/TRAINING PROGRAM

## 2018-01-24 PROCEDURE — 25000003 PHARM REV CODE 250: Performed by: STUDENT IN AN ORGANIZED HEALTH CARE EDUCATION/TRAINING PROGRAM

## 2018-01-24 RX ORDER — INDOMETHACIN 25 MG/1
25 CAPSULE ORAL 3 TIMES DAILY
Qty: 21 CAPSULE | Refills: 0 | Status: SHIPPED | OUTPATIENT
Start: 2018-01-24 | End: 2018-01-31

## 2018-01-24 RX ORDER — INDOMETHACIN 25 MG/1
25 CAPSULE ORAL 3 TIMES DAILY
Qty: 21 CAPSULE | Refills: 0 | Status: SHIPPED | OUTPATIENT
Start: 2018-01-24 | End: 2018-01-24

## 2018-01-24 RX ADMIN — SODIUM CHLORIDE: 0.9 INJECTION, SOLUTION INTRAVENOUS at 12:01

## 2018-01-24 RX ADMIN — METHOCARBAMOL 750 MG: 750 TABLET ORAL at 05:01

## 2018-01-24 RX ADMIN — METHOCARBAMOL 750 MG: 750 TABLET ORAL at 12:01

## 2018-01-24 RX ADMIN — PILOCARPINE HYDROCHLORIDE 5 MG: 5 TABLET, FILM COATED ORAL at 11:01

## 2018-01-24 RX ADMIN — BUPROPION HYDROCHLORIDE 300 MG: 150 TABLET, FILM COATED, EXTENDED RELEASE ORAL at 08:01

## 2018-01-24 RX ADMIN — CEFAZOLIN SODIUM 1 G: 1 SOLUTION INTRAVENOUS at 08:01

## 2018-01-24 NOTE — PLAN OF CARE
POD 1 s/p C3/4 disc replacement. Plans for patient to discharge home today with no discharge needs. SW and CM will continue to follow for any additional needs.     PCP: Ria Fermin MD     Pharmacy:   [facility]/pharmacy #[id number] - [place] - [address] AT [facility]  [address] [address]  Phone: [phone] Fax: [phone]    Payor: MEDICARE / Plan: MEDICARE PART A & B / Product Type: [facility] /      01/24/18 0955   Discharge Assessment   Assessment Type Discharge Planning Assessment   Assessment information obtained from? Medical Record   Expected Length of Stay (days) 2   Prior to hospitilization cognitive status: Alert/Oriented   Prior to hospitalization functional status: Independent   Current cognitive status: Alert/Oriented   Current Functional Status: Independent   Lives With alone   Able to Return to Prior Arrangements yes   Is patient able to care for self after discharge? Yes   Who are your caregiver(s) and their phone number(s)? [facility] [phone]   Patient's perception of discharge disposition home or selfcare   Readmission Within The Last 30 Days no previous admission in last 30 days   Patient currently being followed by outpatient case management? No   Patient currently receives any other outside agency services? No   Equipment Currently Used at Home none   Do you have any problems affording any of your prescribed medications? No   Is the patient taking medications as prescribed? yes   Does the patient have transportation home? Yes   Transportation Available family or friend will provide   Does the patient receive services at the Coumadin Clinic? No   Discharge Plan A Home   Discharge Plan B Home Health;Home

## 2018-01-24 NOTE — PLAN OF CARE
POD 1 s/p C3/4 disc replacement. Plans for patient to discharge home today with no discharge needs. SW and CM will continue to follow for any additional needs. Discharge and follow-up instructions to be completed by the bedside nurse.    Future Appointments  Date Time Provider Department Center   2/26/2018 1:00 PM Ngozi Arana MD [facility]      01/24/18 0902   Final Note   Assessment Type Final Discharge Note   Discharge Disposition Home   Hospital Follow Up  Appt(s) scheduled? Yes   Discharge plans and expectations educations in teach back method with documentation complete? Yes

## 2018-01-24 NOTE — NURSING
Discharge instructions discussed with pt. Verbalizes understanding. Paperwork and prescription given to pt. Medications reconciled. IV's D/C'd, catheter x2 intact. Tolerated well. Vital signs stable on RA. No acute distress noted. Awaiting ride.

## 2018-01-24 NOTE — ASSESSMENT & PLAN NOTE
52 y.o. male POD 1 status post: C3/4 Disc Replacement     Pain control  PT/OT: WBAT   DVT PPx: SCDs at all times when not ambulating  Abx: Postop  Labs: Reviewed  Drain(s): Deep: 10cc overnight     Dispo: Drain discontinued. Advance diet as tolerated. XR today. Plan for discharge home this afternoon.

## 2018-01-24 NOTE — PROGRESS NOTES
"[facility]  Orthopedics  Progress Note    Patient Name: Van Jones  MRN: [id number]  Admission Date: 1/23/2018  Hospital Length of Stay: 1 days  Attending Provider: Saleem Noguera MD  Primary Care Provider: Ria Fermin MD  Follow-up For: Procedure(s) (LRB):  CERVICAL TOTAL DISC REPLACEMENT, C3/4  (N/A)    Post-Operative Day: 1 Day Post-Op  Subjective:     Principal Problem:Cervical myelopathy    Principal Orthopedic Problem: As Above    Interval History: NAEO, Pain controlled, mild discomfort with swallowing. Motor and neuro function improved mildly from baseline    Review of patient's allergies indicates:   Allergen Reactions    Lamisil [terbinafine hcl] Rash       Current Facility-Administered Medications   Medication    0.9%  NaCl infusion    acetaminophen tablet 650 mg    buPROPion 24 hr tablet 300 mg    ceFAZolin (ANCEF) 1 gram in dextrose 5 % 50 mL IVPB (premix)    diphenhydrAMINE injection 25 mg    HYDROmorphone injection 1 mg    methocarbamol tablet 750 mg    ondansetron disintegrating tablet 8 mg    oxyCODONE immediate release tablet 10 mg    oxyCODONE immediate release tablet 15 mg    oxyCODONE immediate release tablet 5 mg    pilocarpine tablet 5 mg    promethazine (PHENERGAN) 6.25 mg in dextrose 5 % 50 mL IVPB    ramelteon tablet 8 mg    rosuvastatin tablet 10 mg    sodium chloride 0.9% flush 3 mL     Objective:     Vital Signs (Most Recent):  Temp: 96.3 °F (35.7 °C) (01/24/18 0445)  Pulse: 92 (01/24/18 0445)  Resp: 16 (01/24/18 0445)  BP: 133/68 (01/24/18 0445)  SpO2: (!) 94 % (01/24/18 0445) Vital Signs (24h Range):  Temp:  [96 °F (35.6 °C)-98.5 °F (36.9 °C)] 96.3 °F (35.7 °C)  Pulse:  [] 92  Resp:  [10-18] 16  SpO2:  [91 %-100 %] 94 %  BP: (105-133)/(65-83) 133/68     Weight: 76.2 kg (167 lb 15.9 oz)  Height: 5' 7" (170.2 cm)  Body mass index is 26.31 kg/m².      Intake/Output Summary (Last 24 hours) at 01/24/18 0606  Last data filed at 01/24/18 " 0007   Gross per 24 hour   Intake          3388.67 ml   Output              380 ml   Net          3008.67 ml       Ortho/SPM Exam   PE:    AA&O x 4.  NAD  HEENT:  NCAT, sclera nonicteric  Lungs:  Respirations are equal and unlabored.  CV:  2+ bilateral upper and lower extremity pulses.  Skin:  Intact throughout.    MSK:    Incision C/D/I  Drain in place with minimal output  Motor intact to BUE/BLE  Neuro intact to BUE/BLE          Significant Labs: All pertinent labs within the past 24 hours have been reviewed.    Significant Imaging: I have reviewed all pertinent imaging results/findings.    Assessment/Plan:     * Cervical myelopathy    52 y.o. male POD 1 status post: C3/4 Disc Replacement     Pain control  PT/OT: WBAT   DVT PPx: SCDs at all times when not ambulating  Abx: Postop  Labs: Reviewed  Drain(s): Deep: 10cc overnight     Dispo: Drain discontinued. Advance diet as tolerated. XR today. Plan for discharge home this afternoon.                    Mario Dior MD  Orthopedics  [facility]

## 2018-01-24 NOTE — PLAN OF CARE
Problem: Patient Care Overview  Goal: Plan of Care Review  Outcome: Ongoing (interventions implemented as appropriate)  Plan of care reviewed, patient verbalized understanding. VSS, AAOx4. PRN Oxycodone IR was given for pain. Tolerating regular diet. Anterior neck incision with tegaderm is clean, dry and intact. Anterior neck LAURENCE drain is intact with sanguinous drainage. Up ad marisela, free from falls/injuries. No acute distress noted. Patient is up in bed with call light within reach. Will continue to monitor.

## 2018-01-24 NOTE — SUBJECTIVE & OBJECTIVE
"Principal Problem:Cervical myelopathy    Principal Orthopedic Problem: As Above    Interval History: NAEO, Pain controlled, mild discomfort with swallowing. Motor and neuro function improved mildly from baseline    Review of patient's allergies indicates:   Allergen Reactions    Lamisil [terbinafine hcl] Rash       Current Facility-Administered Medications   Medication    0.9%  NaCl infusion    acetaminophen tablet 650 mg    buPROPion 24 hr tablet 300 mg    ceFAZolin (ANCEF) 1 gram in dextrose 5 % 50 mL IVPB (premix)    diphenhydrAMINE injection 25 mg    HYDROmorphone injection 1 mg    methocarbamol tablet 750 mg    ondansetron disintegrating tablet 8 mg    oxyCODONE immediate release tablet 10 mg    oxyCODONE immediate release tablet 15 mg    oxyCODONE immediate release tablet 5 mg    pilocarpine tablet 5 mg    promethazine (PHENERGAN) 6.25 mg in dextrose 5 % 50 mL IVPB    ramelteon tablet 8 mg    rosuvastatin tablet 10 mg    sodium chloride 0.9% flush 3 mL     Objective:     Vital Signs (Most Recent):  Temp: 96.3 °F (35.7 °C) (01/24/18 0445)  Pulse: 92 (01/24/18 0445)  Resp: 16 (01/24/18 0445)  BP: 133/68 (01/24/18 0445)  SpO2: (!) 94 % (01/24/18 0445) Vital Signs (24h Range):  Temp:  [96 °F (35.6 °C)-98.5 °F (36.9 °C)] 96.3 °F (35.7 °C)  Pulse:  [] 92  Resp:  [10-18] 16  SpO2:  [91 %-100 %] 94 %  BP: (105-133)/(65-83) 133/68     Weight: 76.2 kg (167 lb 15.9 oz)  Height: 5' 7" (170.2 cm)  Body mass index is 26.31 kg/m².      Intake/Output Summary (Last 24 hours) at 01/24/18 0606  Last data filed at 01/24/18 0007   Gross per 24 hour   Intake          3388.67 ml   Output              380 ml   Net          3008.67 ml       Ortho/SPM Exam   PE:    AA&O x 4.  NAD  HEENT:  NCAT, sclera nonicteric  Lungs:  Respirations are equal and unlabored.  CV:  2+ bilateral upper and lower extremity pulses.  Skin:  Intact throughout.    MSK:    Incision C/D/I  Drain in place with minimal output  Motor intact " to BUE/BLE  Neuro intact to BUE/BLE          Significant Labs: All pertinent labs within the past 24 hours have been reviewed.    Significant Imaging: I have reviewed all pertinent imaging results/findings.

## 2018-01-24 NOTE — PLAN OF CARE
Problem: Patient Care Overview  Goal: Plan of Care Review  Outcome: Ongoing (interventions implemented as appropriate)  Plan of care reviewed with patient, verbalizes understanding. Anterior neck icnision with gauze CDI. LAURENCE drain x1, output recorded. Ambulated with patient X1 in hallway, remains free of any falls/trauma. Patient tolerating diet, reports no N/V. Patient reports pain as well controlled. Patient states no other needs at this time, call light tin reach, WCTM.

## 2018-01-25 ENCOUNTER — PATIENT MESSAGE (OUTPATIENT)
Dept: ORTHOPEDICS | Facility: CLINIC | Age: 53
End: 2018-01-25

## 2018-01-25 ENCOUNTER — ANESTHESIA (OUTPATIENT)
Dept: SURGERY | Facility: HOSPITAL | Age: 53
End: 2018-01-25
Payer: MEDICARE

## 2018-01-30 NOTE — DISCHARGE SUMMARY
[facility]  Discharge Summary      Patient Name: Van Jones  MRN: [id number]  Admission Date: 1/23/2018  Hospital Length of Stay: 1 days  Discharge Date and Time: 1/24/2018  2:57 PM  Attending Physician: No att. providers found   Discharging Provider: Mario Dior MD  Primary Care Provider: Ria Fermni MD     HPI: Per Chart    Procedure(s) (LRB):  CERVICAL TOTAL DISC REPLACEMENT, C3/4  (N/A)     Hospital Course:  On 1/23, the patient arrived to the [facility] on the second floor of [facility] for proper pre-operative management.  Upon completion of pre-operative preparation, the patient was taken back to the operative theatre.  A cervical disc replacement was performed without complication and the patient was transported to the post anesthesia care unit in stable condition.   Carr: yes    Drain: yes    Pain Management:     - Regional Anesthetics: nil    After appropriate recovery from the anaesthetic agents used during the surgery the patient was transported to the IP floor for overnight observation. The duration of said observation period was without complication and the patient was discharged home on post-operative day one without complication.        Pending Diagnostic Studies:     None        Final Active Diagnoses:    Diagnosis Date Noted POA    PRINCIPAL PROBLEM:  Cervical myelopathy [G95.9] 12/07/2017 Yes      Problems Resolved During this Admission:    Diagnosis Date Noted Date Resolved POA      Discharged Condition: good    Disposition: Home or Self Care    Follow Up:  Follow-up Information     Saleem Noguera MD In 2 weeks.    Specialties:  Orthopedic Surgery, Spine Surgery  Contact information:  [address] [place] [address] [id number] [phone]                 Patient Instructions:     Diet diabetic     Activity as tolerated     Notify your health care provider if you experience any of the following:  increased confusion or weakness     Notify  your health care provider if you experience any of the following:  persistent dizziness, light-headedness, or visual disturbances     Notify your health care provider if you experience any of the following:  worsening rash     Notify your health care provider if you experience any of the following:  severe persistent headache     Notify your health care provider if you experience any of the following:  difficulty breathing or increased cough     Notify your health care provider if you experience any of the following:  redness, tenderness, or signs of infection (pain, swelling, redness, odor or green/yellow discharge around incision site)     Notify your health care provider if you experience any of the following:  severe uncontrolled pain     Notify your health care provider if you experience any of the following:  persistent nausea and vomiting or diarrhea     Notify your health care provider if you experience any of the following:  temperature >100.4     Activity as tolerated     Other restrictions (specify):   Order Comments: Per Instructions     Change dressing (specify)   Order Comments: Per Instructions       Medications:  Reconciled Home Medications:   Discharge Medication List as of 1/24/2018  1:07 PM      START taking these medications    Details   docusate sodium (COLACE) 100 MG capsule Take 1 capsule (100 mg total) by mouth 2 (two) times daily., Starting Tue 1/23/2018, Until Tue 2/20/2018, Print      methocarbamol (ROBAXIN) 750 MG Tab Take 1 tablet (750 mg total) by mouth 4 (four) times daily., Starting Tue 1/23/2018, Until Fri 2/2/2018, Print      oxyCODONE-acetaminophen (PERCOCET)  mg per tablet Take 1 tablet by mouth every 4 (four) hours as needed for Pain., Starting Tue 1/23/2018, Print      promethazine (PHENERGAN) 12.5 MG Tab Take 1 tablet (12.5 mg total) by mouth every 4 (four) hours as needed., Starting Tue 1/23/2018, Print         CONTINUE these medications which have NOT CHANGED    Details    aspirin (ECOTRIN) 81 MG EC tablet Take 81 mg by mouth once daily., Historical Med      buPROPion (WELLBUTRIN XL) 300 MG 24 hr tablet Take 300 mg by mouth once daily. , Historical Med      DICLOFENAC SODIUM/MISOPROSTOL (ARTHROTEC 75 ORAL) Take 1 tablet by mouth 2 (two) times daily., Historical Med      pilocarpine (SALAGEN) 5 MG Tab Take 1 tablet (5 mg total) by mouth 2 (two) times daily., Starting Tue 11/21/2017, Until Wed 11/21/2018, Normal      rosuvastatin (CRESTOR) 10 MG tablet Take 10 mg by mouth every evening. , Historical Med         STOP taking these medications       chlorhexidine (HIBICLENS) 4 % external liquid Comments:   Reason for Stopping:         multivitamin capsule Comments:   Reason for Stopping:         mupirocin calcium 2% nasl oint (BACTROBAN) 2 % Oint Comments:   Reason for Stopping:               Mario Dior MD  General Surgery  [facility]

## 2018-02-14 ENCOUNTER — TELEPHONE (OUTPATIENT)
Dept: ORTHOPEDICS | Facility: CLINIC | Age: 53
End: 2018-02-14

## 2018-02-14 ENCOUNTER — OFFICE VISIT (OUTPATIENT)
Dept: ORTHOPEDICS | Facility: CLINIC | Age: 53
End: 2018-02-14
Payer: MEDICARE

## 2018-02-14 VITALS
WEIGHT: 174.19 LBS | HEIGHT: 67 IN | BODY MASS INDEX: 27.34 KG/M2 | DIASTOLIC BLOOD PRESSURE: 62 MMHG | SYSTOLIC BLOOD PRESSURE: 99 MMHG | HEART RATE: 89 BPM

## 2018-02-14 DIAGNOSIS — Z98.890 S/P CERVICAL DISC REPLACEMENT: Primary | ICD-10-CM

## 2018-02-14 DIAGNOSIS — Z98.1 S/P CERVICAL SPINAL FUSION: Primary | ICD-10-CM

## 2018-02-14 PROCEDURE — 99999 PR PBB SHADOW E&M-EST. PATIENT-LVL III: CPT | Mod: PBBFAC,,, | Performed by: ORTHOPAEDIC SURGERY

## 2018-02-14 PROCEDURE — 99024 POSTOP FOLLOW-UP VISIT: CPT | Mod: POP,,, | Performed by: ORTHOPAEDIC SURGERY

## 2018-02-14 PROCEDURE — 99213 OFFICE O/P EST LOW 20 MIN: CPT | Mod: PBBFAC | Performed by: ORTHOPAEDIC SURGERY

## 2018-02-15 NOTE — PROGRESS NOTES
Date of Surgery: 1/23/18    Procedure: C3/4 disc replacement    History: Van Jones is seen today for follow-up following the above listed procedure. Overall the patient is doing well.    Exam: Incision is healing well. There is no sign of infection. Neuro exam is stable. No signs of DVT.    Radiographs: no new films today    Assessment/Plan: Doing well postoperatively. I will plan to see the patient back for the next postop visit in 6 weeks. Thank you for the opportunity to participate in this patient's care. Please give me a call if there are any concerns or questions.

## 2018-02-18 ENCOUNTER — PATIENT MESSAGE (OUTPATIENT)
Dept: ORTHOPEDICS | Facility: CLINIC | Age: 53
End: 2018-02-18

## 2018-02-26 ENCOUNTER — OFFICE VISIT (OUTPATIENT)
Dept: RHEUMATOLOGY | Facility: CLINIC | Age: 53
End: 2018-02-26
Payer: MEDICARE

## 2018-02-26 VITALS
RESPIRATION RATE: 18 BRPM | HEART RATE: 78 BPM | SYSTOLIC BLOOD PRESSURE: 100 MMHG | DIASTOLIC BLOOD PRESSURE: 64 MMHG | BODY MASS INDEX: 26.81 KG/M2 | WEIGHT: 170.81 LBS | HEIGHT: 67 IN

## 2018-02-26 DIAGNOSIS — M35.00 SJOGREN'S SYNDROME, WITH UNSPECIFIED ORGAN INVOLVEMENT: Primary | ICD-10-CM

## 2018-02-26 PROCEDURE — 99999 PR PBB SHADOW E&M-EST. PATIENT-LVL III: CPT | Mod: PBBFAC,,, | Performed by: INTERNAL MEDICINE

## 2018-02-26 PROCEDURE — 99214 OFFICE O/P EST MOD 30 MIN: CPT | Mod: S$PBB,,, | Performed by: INTERNAL MEDICINE

## 2018-02-26 PROCEDURE — 99213 OFFICE O/P EST LOW 20 MIN: CPT | Mod: PBBFAC | Performed by: INTERNAL MEDICINE

## 2018-02-26 RX ORDER — HYDROXYCHLOROQUINE SULFATE 200 MG/1
200 TABLET, FILM COATED ORAL 2 TIMES DAILY
Qty: 60 TABLET | Refills: 5 | Status: SHIPPED | OUTPATIENT
Start: 2018-02-26 | End: 2018-06-04 | Stop reason: SDUPTHER

## 2018-02-26 RX ORDER — PREDNISONE 20 MG/1
TABLET ORAL
Qty: 24 TABLET | Refills: 0 | Status: SHIPPED | OUTPATIENT
Start: 2018-02-26 | End: 2018-03-28
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
## 2018-02-27 ENCOUNTER — PATIENT MESSAGE (OUTPATIENT)
Dept: ORTHOPEDICS | Facility: CLINIC | Age: 53
End: 2018-02-27

## 2018-03-04 ENCOUNTER — PATIENT MESSAGE (OUTPATIENT)
Dept: RHEUMATOLOGY | Facility: CLINIC | Age: 53
End: 2018-03-04

## 2018-03-05 RX ORDER — BACLOFEN 10 MG/1
TABLET ORAL
Qty: 30 TABLET | Refills: 0 | Status: SHIPPED | OUTPATIENT
Start: 2018-03-05 | End: 2018-05-31 | Stop reason: ALTCHOICE

## 2018-03-10 ENCOUNTER — PATIENT MESSAGE (OUTPATIENT)
Dept: RHEUMATOLOGY | Facility: CLINIC | Age: 53
End: 2018-03-10

## 2018-03-12 DIAGNOSIS — R60.9 PAROTID SWELLING: Primary | ICD-10-CM

## 2018-03-28 ENCOUNTER — OFFICE VISIT (OUTPATIENT)
Dept: ORTHOPEDICS | Facility: CLINIC | Age: 53
End: 2018-03-28
Payer: MEDICARE

## 2018-03-28 ENCOUNTER — HOSPITAL ENCOUNTER (OUTPATIENT)
Dept: RADIOLOGY | Facility: HOSPITAL | Age: 53
Discharge: HOME | End: 2018-03-28
Attending: ORTHOPAEDIC SURGERY
Payer: MEDICARE

## 2018-03-28 VITALS — BODY MASS INDEX: 27.55 KG/M2 | WEIGHT: 175.5 LBS | HEIGHT: 67 IN

## 2018-03-28 DIAGNOSIS — Z98.1 S/P CERVICAL SPINAL FUSION: ICD-10-CM

## 2018-03-28 DIAGNOSIS — Z98.890 S/P CERVICAL DISC REPLACEMENT: Primary | ICD-10-CM

## 2018-03-28 PROCEDURE — 99024 POSTOP FOLLOW-UP VISIT: CPT | Mod: POP,,, | Performed by: ORTHOPAEDIC SURGERY

## 2018-03-28 PROCEDURE — 99999 PR PBB SHADOW E&M-EST. PATIENT-LVL III: CPT | Mod: PBBFAC,,, | Performed by: ORTHOPAEDIC SURGERY

## 2018-03-28 PROCEDURE — 99213 OFFICE O/P EST LOW 20 MIN: CPT | Mod: PBBFAC,25 | Performed by: ORTHOPAEDIC SURGERY

## 2018-03-28 PROCEDURE — 72040 X-RAY EXAM NECK SPINE 2-3 VW: CPT | Mod: TC

## 2018-03-28 PROCEDURE — 72040 X-RAY EXAM NECK SPINE 2-3 VW: CPT | Mod: 26,,, | Performed by: RADIOLOGY

## 2018-03-29 ENCOUNTER — PATIENT MESSAGE (OUTPATIENT)
Dept: ORTHOPEDICS | Facility: CLINIC | Age: 53
End: 2018-03-29

## 2018-04-02 NOTE — PROGRESS NOTES
Date of Surgery: 1/23/18    Procedure: C3/4 disc replacement    History: Van Jones is seen today for follow-up following the above listed procedure. Overall the patient is doing great.    Exam: Incision is healed. There is no sign of infection. Neuro exam is stable. No signs of DVT.    Radiographs: Stable implants s/p C3/4 TDA.    Assessment/Plan: Doing well postoperatively. I will plan to see the patient back for the next postop visit at 6 months from surgery. Thank you for the opportunity to participate in this patient's care. Please give me a call if there are any concerns or questions.

## 2018-04-03 ENCOUNTER — PATIENT MESSAGE (OUTPATIENT)
Dept: OTOLARYNGOLOGY | Facility: CLINIC | Age: 53
End: 2018-04-03

## 2018-04-03 ENCOUNTER — OFFICE VISIT (OUTPATIENT)
Dept: OTOLARYNGOLOGY | Facility: CLINIC | Age: 53
End: 2018-04-03
Payer: MEDICARE

## 2018-04-03 VITALS
TEMPERATURE: 99 F | SYSTOLIC BLOOD PRESSURE: 106 MMHG | WEIGHT: 177.69 LBS | HEART RATE: 88 BPM | DIASTOLIC BLOOD PRESSURE: 63 MMHG | BODY MASS INDEX: 27.83 KG/M2

## 2018-04-03 DIAGNOSIS — R60.9 PAROTID SWELLING: Primary | ICD-10-CM

## 2018-04-03 PROCEDURE — 99203 OFFICE O/P NEW LOW 30 MIN: CPT | Mod: S$PBB,,, | Performed by: OTOLARYNGOLOGY

## 2018-04-03 PROCEDURE — 99999 PR PBB SHADOW E&M-EST. PATIENT-LVL III: CPT | Mod: PBBFAC,,, | Performed by: OTOLARYNGOLOGY

## 2018-04-03 PROCEDURE — 99213 OFFICE O/P EST LOW 20 MIN: CPT | Mod: PBBFAC | Performed by: OTOLARYNGOLOGY

## 2018-04-03 NOTE — ASSESSMENT & PLAN NOTE
"L parotid swelling in the setting of Sjogren's disease.  Given his lack of symptoms, we have 2 options: observation vs sialendocopy.  He is interested in sialendoscopy.  He understands that there is a legitimate chance that this intervention will not improve his swelling.    I reviewed the risks and benefits of salivary endoscopy.  I explained that this procedure entails dilation of the salivary ducts and diagnostic endoscopy.  There is also the potential for intervention with removal of sialoliths, dilation of strictures, and application of corticosteroids.  I explained that any intervention would likely result in the placement of a stent.  This stent would be sutured to the inside of the mouth and left in place for 3 weeks.    I explained that there is a risk of ductal stenosis and resulting chronic sialadenitis, lingual nerve (submandibular) or facial nerve (parotid) injury, sialocele, salivary fistula, and need for conversion to an open procedure.  I explained the possibility of a "combined approach" in which both endoscopic and open techniques are utilized.  I also explained that in combined procedures of the parotid gland, a facial nerve monitor is used to ensure the safety of the facial nerve.    He understands and wishes to proceed with surgery.    I will contact him once we have secured the salivary endoscopy instruments.  We will schedule surgery at that time.   "

## 2018-04-03 NOTE — LETTER
April 3, 2018      Ngozi Arana MD  [address]  [address]  [place] [address]           [facility]  [address]  [facility] [address]  Phone: [phone]  Fax: [phone]          Patient: Van Jones   MR Number: [id number]   YOB: 1965   Date of Visit: 4/3/2018       Dear Dr. Ngozi Arana:    Thank you for referring Van Jones to me for evaluation. Attached you will find relevant portions of my assessment and plan of care.    If you have questions, please do not hesitate to call me. I look forward to following Van Jones along with you.    Sincerely,    Elver Amador MD    Enclosure  CC:  No Recipients    If you would like to receive this communication electronically, please contact [email or web address] or [phone] to request more information on [employer] Link access.    For providers and/or their staff who would like to refer a patient to [facility], please contact us through our one-stop-shop provider referral line, [facility] [place], at [phone].    If you feel you have received this communication in error or would no longer like to receive these types of communications, please e-mail [email or web address]

## 2018-04-03 NOTE — PROGRESS NOTES
Chief Complaint   Patient presents with    Consult       HPI   52 y.o. male presents from Dr. Arana for evaluation of L parotid swelling.  He has a history of +SSA and a lip biopsy consistent with Sjogren's.  He denies dry eyes or dry mouth.  He reports that his parotid swelling has been present for an uncertain amount of time.  He feels that it may have been present for years.  He denies pain.  He denies fluctuation in size of the parotid.  He has been treated with abx and steroids with no improvement.  MRI in 10/17 revealed diffuse parotid swelling bilaterally, L>R.     Review of Systems   Constitutional: Negative for fatigue and unexpected weight change.   HENT: Per HPI.  Eyes: Negative for visual disturbance.   Respiratory: Negative for shortness of breath, hemoptysis   Cardiovascular: Negative for chest pain and palpitations.   Musculoskeletal: Negative for decreased ROM, back pain.   Skin: Negative for rash, sunburn, itching.   Neurological: Negative for dizziness and seizures.   Hematological: Negative for adenopathy. Does not bruise/bleed easily.   Endocrine: Negative for rapid weight loss/weight gain, heat/cold intolerance.     Past Medical History   Patient Active Problem List   Diagnosis    Colon cancer screening    Cervical myelopathy    Depression    RSD (reflex sympathetic dystrophy)    History of pulmonary embolism    Sjogren's syndrome    HLD (hyperlipidemia)    NSAID long-term use           Past Surgical History   Past Surgical History:   Procedure Laterality Date    CERVICAL DISC ARTHROPLASTY  01/23/2018    C3/4    COLONOSCOPY N/A 2/25/2016    Procedure: COLONOSCOPY;  Surgeon: Daryl Viramontes MD;  Location: [address]);  Service: Endoscopy;  Laterality: N/A;  PM Prep      FEMUR SURGERY      HERNIA REPAIR      umbilical    TONSILLECTOMY           Family History   Family History   Problem Relation Age of Onset    Heart disease Mother 60    Heart disease Father 45            Social History   .  Social History     Social History    Marital status:      Spouse name: N/A    Number of children: N/A    Years of education: N/A     Occupational History    Not on file.     Social History Main Topics    Smoking status: Never Smoker    Smokeless tobacco: Never Used    Alcohol use Yes      Comment: occasional    Drug use: No    Sexual activity: Not on file     Other Topics Concern    Not on file     Social History Narrative    No narrative on file         Allergies   Review of patient's allergies indicates:   Allergen Reactions    Lamisil [terbinafine hcl] Rash           Physical Exam     Vitals:    04/03/18 1414   BP: 106/63   Pulse: 88   Temp: 99.3 °F (37.4 °C)         Body mass index is 27.83 kg/m².      General: AOx3, NAD   Respiratory:  Symmetric chest rise, normal effort  Right Ear: External Auditory Canal WNL,TM w/o masses/lesions/perforations.  Middle ear without evidence of effusion.   Left Ear: External Auditory Canal WNL,TM w/o masses/lesions/perforations.  Middle ear without evidence of effusion.   Nose: No gross nasal septal deviation. Inferior Turbinates WNL bilaterally. No septal perforation. No masses/lesions.   Oral Cavity:  Oral Tongue mobile, no lesions noted. Hard Palate WNL. No buccal or FOM lesions.  Clear saliva from L Rickey's duct.  Oropharynx:  No masses/lesions of the posterior pharyngeal wall. Tonsillar fossa without lesions. Soft palate without masses. Midline uvula.   Neck: No scars.  No cervical lymphadenopathy, thyromegaly or thyroid nodules.  Normal range of motion.    Face: House Brackmann I bilaterally.     Neck MRI reviewed    Assessment/Plan  Problem List Items Addressed This Visit        ENT    Parotid swelling - Primary     L parotid swelling in the setting of Sjogren's disease.  Given his lack of symptoms, we have 2 options: observation vs sialendocopy.  He is interested in sialendoscopy.  He understands that there is a  "legitimate chance that this intervention will not improve his swelling.    I reviewed the risks and benefits of salivary endoscopy.  I explained that this procedure entails dilation of the salivary ducts and diagnostic endoscopy.  There is also the potential for intervention with removal of sialoliths, dilation of strictures, and application of corticosteroids.  I explained that any intervention would likely result in the placement of a stent.  This stent would be sutured to the inside of the mouth and left in place for 3 weeks.    I explained that there is a risk of ductal stenosis and resulting chronic sialadenitis, lingual nerve (submandibular) or facial nerve (parotid) injury, sialocele, salivary fistula, and need for conversion to an open procedure.  I explained the possibility of a "combined approach" in which both endoscopic and open techniques are utilized.  I also explained that in combined procedures of the parotid gland, a facial nerve monitor is used to ensure the safety of the facial nerve.    He understands and wishes to proceed with surgery.    I will contact him once we have secured the salivary endoscopy instruments.  We will schedule surgery at that time.                      "

## 2018-04-05 ENCOUNTER — PATIENT MESSAGE (OUTPATIENT)
Dept: OTOLARYNGOLOGY | Facility: CLINIC | Age: 53
End: 2018-04-05

## 2018-04-05 DIAGNOSIS — R60.9 PAROTID SWELLING: Primary | ICD-10-CM

## 2018-04-05 RX ORDER — LIDOCAINE HYDROCHLORIDE 10 MG/ML
1 INJECTION, SOLUTION EPIDURAL; INFILTRATION; INTRACAUDAL; PERINEURAL ONCE
Status: CANCELLED | OUTPATIENT
Start: 2018-04-05 | End: 2018-04-05

## 2018-04-30 ENCOUNTER — ANESTHESIA EVENT (OUTPATIENT)
Dept: SURGERY | Facility: HOSPITAL | Age: 53
End: 2018-04-30
Payer: MEDICARE

## 2018-04-30 NOTE — PRE ADMISSION SCREENING
Anesthesia Assessment: Preoperative EQUATION    Planned Procedure: Procedure(s) (LRB):  ENDOSCOPY (Left)  Requested Anesthesia Type:General  Surgeon: Elver Amador MD  Service: ENT  Known or anticipated Date of Surgery:5/11/2018    Surgeon notes: reviewed    Electronic QUestionnaire Assessment completed via nurse interview with patient.      NO AQ    Triage considerations:     The patient has no apparent active cardiac condition (No unstable coronary Syndrome such as severe unstable angina or recent [<1 month] myocardial infarction, decompensated CHF, severe valvular   disease or significant arrhythmia)    Previous anesthesia records:GETA and No problems   01/23/18; Placement Time: 0710; Method of Intubation: Thorpe; Inserted by: CRNA; Airway Device: Endotracheal Tube (NIMS tube); Mask Ventilation: Easy; Intubated: Postinduction; Blade: Kale #3; Airway Device Size: 7.0; Style: Cuffed; Cuff Inflation: Minimal occlusive pressure; Inflation Amount: 6; Placement Verified By: Auscultation, Capnometry, ETT Condensation; Grade: Grade I; Complicating Factors: Poor neck/head extension, Small mouth; Intubation Findings: Positive EtCO2, Bilateral breath sounds, Atraumatic/Condition of teeth unchanged;  Depth of Insertion: 21; Securment: Lips; Complications: None; Breath Sounds: Equal Bilateral; Insertion Attempts: 1;   Airway/Jaw/Neck:  Airway Findings: Mouth Opening: Normal Tongue: Normal  Mallampati: II  TM Distance: Normal, at least 6 cm       [facility]   Subspecialty notes: Rheumatology    Other important co-morbidities: Sjogren's Syndrome, HLD, Depression, Hx DVT/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            Instructions given. (See in Nurse's note)    Optimization:  Anesthesia Preop Clinic Assessment  Indicated-not required for this surgery        Plan:    Testing:  none needed     Patient  has previously scheduled Medical Appointment:  none    Navigation: Tests Scheduled. none             Straight Line to surgery.               No tests, anesthesia preop clinic visit, or consult required.     Simran Goodwin RN

## 2018-04-30 NOTE — ANESTHESIA PREPROCEDURE EVALUATION
Anesthesia Assessment: Preoperative EQUATION     Planned Procedure: Procedure(s) (LRB):  ENDOSCOPY (Left)  Requested Anesthesia Type:General  Surgeon: Elver Amador MD  Service: ENT  Known or anticipated Date of Surgery:5/11/2018     Surgeon notes: reviewed     Electronic QUestionnaire Assessment completed via nurse interview with patient.      NO AQ     Triage considerations:      The patient has no apparent active cardiac condition (No unstable coronary Syndrome such as severe unstable angina or recent [<1 month] myocardial infarction, decompensated CHF, severe valvular   disease or significant arrhythmia)     Previous anesthesia records:GETA and No problems   01/23/18; Placement Time: 0710; Method of Intubation: Thorpe; Inserted by: CRNA; Airway Device: Endotracheal Tube (NIMS tube); Mask Ventilation: Easy; Intubated: Postinduction; Blade: Kale #3; Airway Device Size: 7.0; Style: Cuffed; Cuff Inflation: Minimal occlusive pressure; Inflation Amount: 6; Placement Verified By: Auscultation, Capnometry, ETT Condensation; Grade: Grade I; Complicating Factors: Poor neck/head extension, Small mouth; Intubation Findings: Positive EtCO2, Bilateral breath sounds, Atraumatic/Condition of teeth unchanged;  Depth of Insertion: 21; Securment: Lips; Complications: None; Breath Sounds: Equal Bilateral; Insertion Attempts: 1;   Airway/Jaw/Neck:  Airway Findings: Mouth Opening: Normal Tongue: Normal  Mallampati: II  TM Distance: Normal, at least 6 cm        [facility]   Subspecialty notes: Rheumatology     Other important co-morbidities: Sjogren's Syndrome, HLD, Depression, Hx DVT/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                            Instructions given. (See in Nurse's note)     Optimization:  Anesthesia Preop Clinic Assessment  Indicated-not required for this surgery                Plan:    Testing:  none needed                            Patient  has previously scheduled Medical Appointment: none     Navigation: Tests Scheduled. none                        Straight Line to surgery.                          No tests, anesthesia preop clinic visit, or consult required.      Simran Goodwin RN                                                 Electronically signed by Simran Goodwin RN at 4/30/2018  1:28 PM                                                                                                                   04/30/2018  Van Jones is a 52 y.o., male.    Anesthesia Evaluation    I have reviewed the Patient Summary Reports.    I have reviewed the Nursing Notes.   I have reviewed the Medications.     Review of Systems  Anesthesia Hx:  No problems with previous Anesthesia    Social:  Non-Smoker    EENT/Dental:   Throat Disease: Sjogren's Syndrome   Cardiovascular:  Deep Venous Thrombosis (DVT), Hx of DVT    Pulmonary:  Pulmonary Embolism, > 6 months ago, resolved    Neurological:  Neuromuscular Disease RSD  Psych:   Psychiatric History depression          Physical Exam  General:  Well nourished    Airway/Jaw/Neck:  Airway Findings: Mouth Opening: Small, but > 3cm Tongue: Normal  General Airway Assessment: Adult  Mallampati: III      Dental:  Dental Findings: In tact   Chest/Lungs:  Chest/Lungs Findings: Clear to auscultation, Normal Respiratory Rate     Heart/Vascular:  Heart Findings: Rate: Normal  Rhythm: Regular Rhythm  Sounds: Normal        Mental Status:  Mental Status Findings:  Cooperative, Alert and Oriented         Anesthesia Plan  Type of Anesthesia, risks & benefits discussed:  Anesthesia Type:  general  Patient's Preference: ga  Intra-op Monitoring Plan: standard ASA monitors  Intra-op Monitoring Plan Comments:   Post Op Pain Control Plan:   Post Op Pain Control Plan Comments:   Induction:   IV  Beta Blocker:  Patient is not currently on a Beta-Blocker (No further documentation required).       Informed Consent: Patient  understands risks and agrees with Anesthesia plan.  Questions answered. Anesthesia consent signed with patient.  ASA Score: 2     Day of Surgery Review of History & Physical:    H&P update referred to the surgeon.         Ready For Surgery From Anesthesia Perspective.

## 2018-05-02 ENCOUNTER — TELEPHONE (OUTPATIENT)
Dept: RHEUMATOLOGY | Facility: CLINIC | Age: 53
End: 2018-05-02

## 2018-05-02 NOTE — TELEPHONE ENCOUNTER
Reviewed outside optho note.  4/17/2018 from Dr. Wise    He was noted to have no dry eye and mild cataracts of both eyes.  No maculopathy from plaquenil.

## 2018-05-10 ENCOUNTER — TELEPHONE (OUTPATIENT)
Dept: OTOLARYNGOLOGY | Facility: CLINIC | Age: 53
End: 2018-05-10

## 2018-05-11 ENCOUNTER — SURGERY (OUTPATIENT)
Age: 53
End: 2018-05-11

## 2018-05-11 ENCOUNTER — ANESTHESIA (OUTPATIENT)
Dept: SURGERY | Facility: HOSPITAL | Age: 53
End: 2018-05-11
Payer: MEDICARE

## 2018-05-11 ENCOUNTER — HOSPITAL ENCOUNTER (OUTPATIENT)
Facility: HOSPITAL | Age: 53
Discharge: HOME | End: 2018-05-11
Attending: OTOLARYNGOLOGY | Admitting: OTOLARYNGOLOGY
Payer: MEDICARE

## 2018-05-11 VITALS
BODY MASS INDEX: 26.68 KG/M2 | TEMPERATURE: 99 F | SYSTOLIC BLOOD PRESSURE: 104 MMHG | HEIGHT: 67 IN | WEIGHT: 170 LBS | RESPIRATION RATE: 16 BRPM | HEART RATE: 71 BPM | OXYGEN SATURATION: 95 % | DIASTOLIC BLOOD PRESSURE: 61 MMHG

## 2018-05-11 DIAGNOSIS — R60.9 PAROTID SWELLING: ICD-10-CM

## 2018-05-11 DIAGNOSIS — K11.20 PAROTITIS: ICD-10-CM

## 2018-05-11 PROCEDURE — 42650 DILATION OF SALIVARY DUCT: CPT | Mod: GC,,, | Performed by: OTOLARYNGOLOGY

## 2018-05-11 PROCEDURE — 36000707: Performed by: OTOLARYNGOLOGY

## 2018-05-11 PROCEDURE — 27000221 HC OXYGEN, UP TO 24 HOURS

## 2018-05-11 PROCEDURE — D9220A PRA ANESTHESIA: Mod: CRNA,,, | Performed by: NURSE ANESTHETIST, CERTIFIED REGISTERED

## 2018-05-11 PROCEDURE — 63600175 PHARM REV CODE 636 W HCPCS: Performed by: NURSE ANESTHETIST, CERTIFIED REGISTERED

## 2018-05-11 PROCEDURE — 71000033 HC RECOVERY, INTIAL HOUR: Performed by: OTOLARYNGOLOGY

## 2018-05-11 PROCEDURE — 25000003 PHARM REV CODE 250: Performed by: OTOLARYNGOLOGY

## 2018-05-11 PROCEDURE — 37000009 HC ANESTHESIA EA ADD 15 MINS: Performed by: OTOLARYNGOLOGY

## 2018-05-11 PROCEDURE — 94761 N-INVAS EAR/PLS OXIMETRY MLT: CPT

## 2018-05-11 PROCEDURE — 63600175 PHARM REV CODE 636 W HCPCS: Performed by: OTOLARYNGOLOGY

## 2018-05-11 PROCEDURE — S0028 INJECTION, FAMOTIDINE, 20 MG: HCPCS | Performed by: NURSE ANESTHETIST, CERTIFIED REGISTERED

## 2018-05-11 PROCEDURE — D9220A PRA ANESTHESIA: Mod: ANES,,, | Performed by: ANESTHESIOLOGY

## 2018-05-11 PROCEDURE — 71000015 HC POSTOP RECOV 1ST HR: Performed by: OTOLARYNGOLOGY

## 2018-05-11 PROCEDURE — 36000706: Performed by: OTOLARYNGOLOGY

## 2018-05-11 PROCEDURE — 25000003 PHARM REV CODE 250: Performed by: NURSE ANESTHETIST, CERTIFIED REGISTERED

## 2018-05-11 PROCEDURE — 37000008 HC ANESTHESIA 1ST 15 MINUTES: Performed by: OTOLARYNGOLOGY

## 2018-05-11 RX ORDER — PROPOFOL 10 MG/ML
VIAL (ML) INTRAVENOUS
Status: DISCONTINUED | OUTPATIENT
Start: 2018-05-11 | End: 2018-05-11

## 2018-05-11 RX ORDER — DEXAMETHASONE SODIUM PHOSPHATE 4 MG/ML
INJECTION, SOLUTION INTRA-ARTICULAR; INTRALESIONAL; INTRAMUSCULAR; INTRAVENOUS; SOFT TISSUE
Status: DISCONTINUED | OUTPATIENT
Start: 2018-05-11 | End: 2018-05-11

## 2018-05-11 RX ORDER — ROCURONIUM BROMIDE 10 MG/ML
INJECTION, SOLUTION INTRAVENOUS
Status: DISCONTINUED | OUTPATIENT
Start: 2018-05-11 | End: 2018-05-11

## 2018-05-11 RX ORDER — FENTANYL CITRATE 50 UG/ML
25 INJECTION, SOLUTION INTRAMUSCULAR; INTRAVENOUS EVERY 5 MIN PRN
Status: DISCONTINUED | OUTPATIENT
Start: 2018-05-11 | End: 2018-05-11 | Stop reason: HOSPADM

## 2018-05-11 RX ORDER — SODIUM CHLORIDE 0.9 % (FLUSH) 0.9 %
3 SYRINGE (ML) INJECTION
Status: DISCONTINUED | OUTPATIENT
Start: 2018-05-11 | End: 2018-05-11 | Stop reason: HOSPADM

## 2018-05-11 RX ORDER — LIDOCAINE HCL/PF 100 MG/5ML
SYRINGE (ML) INTRAVENOUS
Status: DISCONTINUED | OUTPATIENT
Start: 2018-05-11 | End: 2018-05-11

## 2018-05-11 RX ORDER — SUCCINYLCHOLINE CHLORIDE 20 MG/ML
INJECTION INTRAMUSCULAR; INTRAVENOUS
Status: DISCONTINUED | OUTPATIENT
Start: 2018-05-11 | End: 2018-05-11

## 2018-05-11 RX ORDER — SODIUM CHLORIDE 9 MG/ML
INJECTION, SOLUTION INTRAVENOUS CONTINUOUS
Status: DISCONTINUED | OUTPATIENT
Start: 2018-05-11 | End: 2018-05-11 | Stop reason: HOSPADM

## 2018-05-11 RX ORDER — MIDAZOLAM HYDROCHLORIDE 1 MG/ML
INJECTION, SOLUTION INTRAMUSCULAR; INTRAVENOUS
Status: DISCONTINUED | OUTPATIENT
Start: 2018-05-11 | End: 2018-05-11

## 2018-05-11 RX ORDER — TRIAMCINOLONE ACETONIDE 40 MG/ML
INJECTION, SUSPENSION INTRA-ARTICULAR; INTRAMUSCULAR
Status: DISCONTINUED | OUTPATIENT
Start: 2018-05-11 | End: 2018-05-11 | Stop reason: HOSPADM

## 2018-05-11 RX ORDER — FAMOTIDINE 10 MG/ML
INJECTION INTRAVENOUS
Status: DISCONTINUED | OUTPATIENT
Start: 2018-05-11 | End: 2018-05-11

## 2018-05-11 RX ORDER — LIDOCAINE HYDROCHLORIDE 10 MG/ML
1 INJECTION, SOLUTION EPIDURAL; INFILTRATION; INTRACAUDAL; PERINEURAL ONCE
Status: COMPLETED | OUTPATIENT
Start: 2018-05-11 | End: 2018-05-11

## 2018-05-11 RX ORDER — FENTANYL CITRATE 50 UG/ML
INJECTION, SOLUTION INTRAMUSCULAR; INTRAVENOUS
Status: DISCONTINUED | OUTPATIENT
Start: 2018-05-11 | End: 2018-05-11

## 2018-05-11 RX ORDER — ONDANSETRON 8 MG/1
8 TABLET, ORALLY DISINTEGRATING ORAL EVERY 6 HOURS PRN
Status: DISCONTINUED | OUTPATIENT
Start: 2018-05-11 | End: 2018-05-11 | Stop reason: HOSPADM

## 2018-05-11 RX ADMIN — DEXAMETHASONE SODIUM PHOSPHATE 4 MG: 4 INJECTION, SOLUTION INTRAMUSCULAR; INTRAVENOUS at 01:05

## 2018-05-11 RX ADMIN — LIDOCAINE HYDROCHLORIDE 75 MG: 20 INJECTION, SOLUTION INTRAVENOUS at 01:05

## 2018-05-11 RX ADMIN — PROPOFOL 150 MG: 10 INJECTION, EMULSION INTRAVENOUS at 01:05

## 2018-05-11 RX ADMIN — MIDAZOLAM HYDROCHLORIDE 2 MG: 1 INJECTION, SOLUTION INTRAMUSCULAR; INTRAVENOUS at 01:05

## 2018-05-11 RX ADMIN — PROPOFOL 50 MG: 10 INJECTION, EMULSION INTRAVENOUS at 01:05

## 2018-05-11 RX ADMIN — TRIAMCINOLONE ACETONIDE 10 MG: 40 INJECTION, SUSPENSION INTRA-ARTICULAR; INTRAMUSCULAR at 01:05

## 2018-05-11 RX ADMIN — ROCURONIUM BROMIDE 5 MG: 10 INJECTION, SOLUTION INTRAVENOUS at 01:05

## 2018-05-11 RX ADMIN — LIDOCAINE HYDROCHLORIDE 0.2 MG: 10 INJECTION, SOLUTION EPIDURAL; INFILTRATION; INTRACAUDAL; PERINEURAL at 11:05

## 2018-05-11 RX ADMIN — FENTANYL CITRATE 75 MCG: 50 INJECTION, SOLUTION INTRAMUSCULAR; INTRAVENOUS at 01:05

## 2018-05-11 RX ADMIN — SODIUM CHLORIDE: 0.9 INJECTION, SOLUTION INTRAVENOUS at 11:05

## 2018-05-11 RX ADMIN — FAMOTIDINE 20 MG: 10 INJECTION, SOLUTION INTRAVENOUS at 01:05

## 2018-05-11 RX ADMIN — SUCCINYLCHOLINE CHLORIDE 120 MG: 20 INJECTION, SOLUTION INTRAMUSCULAR; INTRAVENOUS at 01:05

## 2018-05-11 NOTE — OP NOTE
DATE OF PROCEDURE:  05/11/2018.    PREOPERATIVE DIAGNOSES:  1.  Sjogren's disease.  2.  Left parotid swelling.    POSTOPERATIVE DIAGNOSIS:  1.  Sjogren's disease.  2.  Left parotid swelling.    PROCEDURES:  1.  Left parotid sialendoscopy with assessment of left parotid ductal system and   injection of 2 mL of Kenalog 10 into ductal system.  2.  Use of operating microscope.    SURGEON:  Elver Amador M.D.    ASSISTANT:  Barry Wolf M.D. (RES)    ANESTHESIA:  General endotracheal.    ESTIMATED BLOOD LOSS:  Minimal.    SPECIMENS:  None.    INDICATIONS FOR PROCEDURE:  Mr. Jones is a 52-year-old gentleman with a   history of Sjogren syndrome.  He reported that he has experienced parotid   swelling for some time.  He denies fluctuation in the size of the parotid.  He   had been treated with antibiotics and steroids with no improvement.  MRI in   October of last year revealed bilateral parotid swelling, left greater than   right.  He indicated an interest in undergoing sialoendoscopy.  I explained to   him that observation was also reasonable course of action.  He was apprised of   the risks, benefits and alternatives to surgery.  In spite of the risks inherent   to surgery, he provided informed consent.    PROCEDURE IN DETAIL:  The patient was taken to the Operating Room and placed on   the operating table in supine position.  General endotracheal anesthesia was   induced by the Anesthesia Team.  The operating table was rotated 90 degrees to   the right.  The operating microscope was then brought into the field and the   left parotid papilla was addressed.  The papilla was dilated to accommodate a   1.2 mm salivary endoscope, which was advanced into the duct.  The duct was   irrigated continuously with sterile water and the scope was advanced from distal   to proximal.  At the distal most aspect of the duct, there was a small scar   band noted, which was dilated with the endoscope.  The scope was then advanced    back to the hilum of the gland with no other abnormal findings being noted.  The   scope was then withdrawn and utilizing a 22-gauge Angiocath, 2 mL of Kenalog 10   were injected into the ductal system and massaged into the gland.  Once the   Kenalog had been injected, the patient was handed back to Anesthesia, awakened,   extubated and transported to Recovery in satisfactory condition.  There were no   intraoperative complications.  I was present and participated in the entire   procedure.      CPH/LEATHA  dd: 05/11/2018 15:02:42 (CDT)  td: 05/11/2018 15:45:56 (CDT)  Doc ID   #[id number]  Job ID #[id number]    CC:

## 2018-05-11 NOTE — TRANSFER OF CARE
"Anesthesia Transfer of Care Note    Patient: Van Jones    Procedure(s) Performed: Procedure(s) (LRB):  ENDOSCOPY (Left)    Patient location: PACU    Anesthesia Type: general    Transport from OR: Transported from OR on 6-10 L/min O2 by face mask with adequate spontaneous ventilation    Post pain: adequate analgesia    Post assessment: no apparent anesthetic complications and tolerated procedure well    Post vital signs: stable    Level of consciousness: awake, alert and oriented    Nausea/Vomiting: no nausea/vomiting    Complications: none    Transfer of care protocol was followed      Last vitals:   Visit Vitals  BP (!) 111/56   Pulse 77   Temp 36.8 °C (98.2 °F)   Resp 14   Ht 5' 7" (1.702 m)   Wt 77.1 kg (170 lb)   SpO2 99%   BMI 26.63 kg/m²     "

## 2018-05-11 NOTE — DISCHARGE INSTRUCTIONS
Treatment for Parotid Duct Obstruction    Parotid duct obstruction is when part of your parotid duct becomes blocked. The parotid duct is a small tube that leads from a gland that makes saliva. The duct sends the saliva into your mouth. When its blocked, saliva cant flow normally.  Types of treatment  You may start with treatments such as:  · Drinking more water  · Applying moist heat to the area  · Massaging the gland and duct  · Sucking on candies to create saliva secretion  · Using pain medicines  · Stopping use of any medicines that lower the amount of saliva you make, if possible  Many symptoms go away quickly with these types of treatments. If your symptoms dont get better, you may need treatments such as:  · Lithotripsy, which uses shock waves to break up the stone  · Wire basket retrieval, which removes the stone through the duct  · Sialoendoscopy, which also removes the stone through the duct  · Open surgery, which may include removal of the parotid duct, if these other methods dont work  Your parotid gland should work as normal after the blockage is removed.  Possible complications of parotid duct obstruction  Sometimes obstruction of the duct also leads to infection of the gland and duct. This is more common in older adults. If you have an infection, you may have a fever and pain that gets worse. You may need treatment with antibiotics.  Most of the time, this kind of infection soon goes away with antibiotics. In other cases a more severe infection may happen. You may have an infection of the deep layers of the skin. This can lead to an abscess in your gland or neck. If your symptoms dont get better, you may need to see an ear, nose, and throat doctor.  When to call your healthcare provider  Call your healthcare provider right away if you have any of these:  · Fever of 100.4°F (38.0°C) or higher  · Pain that gets worse  · Pain in new areas of your head or neck   Date Last Reviewed: 8/10/2015  ©  6308-0593 [employer]. [address] [place] [address], [place] [address]. All rights reserved. This information is not intended as a substitute for professional medical care. Always follow your healthcare professional's instructions.        Salivary Gland Stones  Salivary glands produce saliva in response to food in your mouth. Saliva is mostly water, but also has minerals and proteins that help digest food and keep the mouth and teeth healthy. There are three pairs of salivary glands:  · Parotid glands (in front of the ear)  · Submandibular glands (below the jaw)  · Sublingual glands (below the tongue)  Each gland has a duct (channel) that allows saliva to flow from the gland to the mouth. A salivary gland stone can form as a result of poor salivary flow which allows minerals to deposit, creating a stone. When a stone forms, it blocks the flow of saliva. The gland swells and becomes painful. Symptoms may be worse during eating since food stimulates the flow of saliva.  A blocked salivary gland may become infected. This can cause worsened pain, redness over the gland, and fever.  Tests that help diagnose a salivary gland obstruction include CT-scan, X-ray, ultrasound, or injection of dye into the salivary duct. A stone is discovered may be removed or allowed to pass naturally.  Home care  · Unless another medicine was prescribed, take over-the-counter medicines, such as acetaminophen or ibuprofen, to help relieve pain.  · Moist heat can also help relieve pain. Wet a cloth with warm water and put it over the affected gland for 10-15 minutes several times a day.  · Gently massage the gland a few times a day.   · Suck on lemon or other tart hard candies to encourage flow of saliva.  · To help prevent future blockages:  ¨ Drink 6-8 glasses of fluid per day (such as water, tea, and clear soup) to keep well hydrated.  ¨ If you smoke, ask your healthcare provider for help to quit. Smoking makes salivary gland stones  more likely.  ¨ Maintain good dental hygiene. Brush and floss your teeth daily. See your dentist for regular cleanings.  Follow-up care  Follow up with your healthcare provider or as advised.  When to seek medical advice  Call your healthcare provider if any of the following occur:  · Increasing pain or swelling in the gland  · Inability to open mouth or pain when opening mouth  · Fever of 100.4°F (38ºC) or higher, or as directed by your healthcare provider  · Redness over the tender gland  · Pus draining into the mouth  · Trouble swallowing or breathing  Date Last Reviewed: 5/4/2015  © 4761-5828 [employer]. [address] [place] [address], [place] [address]. All rights reserved. This information is not intended as a substitute for professional medical care. Always follow your healthcare professional's instructions.

## 2018-05-11 NOTE — PLAN OF CARE
Patient and patient's father received discharge instructions.  Patient and patient's father verbalized understanding of all instructions given and all questions were addressed prior to patient's discharge.  Patient's vital signs are stable and within patient's baseline.  Patient tolerated clear liquids PO.  Patient denies pain.  Patient denies nausea and vomiting at this time.  Patient meets all criteria for discharge at this time.  All required consents present in patient's chart upon patient's discharge.

## 2018-05-11 NOTE — PLAN OF CARE
POC reviewed with pt, acknowledged understanding. Pt VSS. Pt on RA denies SOB. Pt remains free of falls/injuries. Pt on telemetry remains SR. Pt tolerating clear liquid diet. Pt pain controlled with prescribed meds. ENT MD at pts bedside, explained POC.  No acute events throughout shift. No distress noted, will continue to monitor.

## 2018-05-11 NOTE — PLAN OF CARE
Patient arrived from PACU with SARAH Camacho RN.  Patient stable.  Report received at this time.  Assumed care of patient at this time.

## 2018-05-11 NOTE — BRIEF OP NOTE
[facility]  Brief Operative Note     SUMMARY     Surgery Date: 5/11/2018     Surgeon(s) and Role:     * Elver Amador MD - Primary     * Barry Wolf MD - Resident - Assisting    Pre-op Diagnosis:  Parotid swelling [R60.9]    Post-op Diagnosis:  Post-Op Diagnosis Codes:     * Parotid swelling [R60.9]    Procedure(s) (LRB):  ENDOSCOPY (Left)    Anesthesia: General    Description of the findings of the procedure: Sialendoscopy of left parotid duct.  See op note for details.      Estimated Blood Loss: * No values recorded between 5/11/2018  1:19 PM and 5/11/2018  1:51 PM *         Specimens:   Specimen (12h ago through future)    None          Discharge Note    SUMMARY     Admit Date: 5/11/2018    Discharge Date and Time:  05/11/2018 2:28 PM    Hospital Course Patient tolerated procedure without complication and stable for discharge post-operatively.    Final Diagnosis: Post-Op Diagnosis Codes:     * Parotid swelling [R60.9]    Disposition: Home or Self Care    Follow Up/Patient Instructions:     Medications:  Reconciled Home Medications:      Medication List      CONTINUE taking these medications    aspirin 81 MG EC tablet  Commonly known as:  ECOTRIN  Take 81 mg by mouth once daily.     buPROPion 300 MG 24 hr tablet  Commonly known as:  WELLBUTRIN XL  Take 300 mg by mouth once daily.     hydroxychloroquine 200 mg tablet  Commonly known as:  PLAQUENIL  Take 1 tablet (200 mg total) by mouth 2 (two) times daily.     pilocarpine 5 MG Tab  Commonly known as:  SALAGEN  Take 1 tablet (5 mg total) by mouth 2 (two) times daily.     rosuvastatin 10 MG tablet  Commonly known as:  CRESTOR  Take 10 mg by mouth every evening.        ASK your doctor about these medications    baclofen 10 MG tablet  Commonly known as:  LIORESAL  Take one to 2 tabs at bedtime            Discharge Procedure Orders  Diet Adult Regular     Activity as tolerated     Notify your health care provider if you experience any of  the following:  temperature >100.4     Notify your health care provider if you experience any of the following:  persistent nausea and vomiting or diarrhea     Notify your health care provider if you experience any of the following:  severe uncontrolled pain     No dressing needed       Follow-up Information     Elver Amador MD In 3 weeks.    Specialty:  Otolaryngology  Why:  For post-op visit  Contact information:  [address] [facility] [address]  [phone]

## 2018-05-11 NOTE — H&P
H&P completed on 4/3/18 has been reviewed, the patient has been examined and:  I concur with the findings and no changes have occurred since H&P was written.      HPI   52 y.o. male presents from Dr. Arana for evaluation of L parotid swelling.  He has a history of +SSA and a lip biopsy consistent with Sjogren's.  He denies dry eyes or dry mouth.  He reports that his parotid swelling has been present for an uncertain amount of time.  He feels that it may have been present for years.  He denies pain.  He denies fluctuation in size of the parotid.  He has been treated with abx and steroids with no improvement.  MRI in 10/17 revealed diffuse parotid swelling bilaterally, L>R.      Review of Systems   Constitutional: Negative for fatigue and unexpected weight change.   HENT: Per HPI.  Eyes: Negative for visual disturbance.   Respiratory: Negative for shortness of breath, hemoptysis   Cardiovascular: Negative for chest pain and palpitations.   Musculoskeletal: Negative for decreased ROM, back pain.   Skin: Negative for rash, sunburn, itching.   Neurological: Negative for dizziness and seizures.   Hematological: Negative for adenopathy. Does not bruise/bleed easily.   Endocrine: Negative for rapid weight loss/weight gain, heat/cold intolerance.      Past Medical History       Patient Active Problem List   Diagnosis    Colon cancer screening    Cervical myelopathy    Depression    RSD (reflex sympathetic dystrophy)    History of pulmonary embolism    Sjogren's syndrome    HLD (hyperlipidemia)    NSAID long-term use               Past Surgical History         Past Surgical History:   Procedure Laterality Date    CERVICAL DISC ARTHROPLASTY   01/23/2018     C3/4    COLONOSCOPY N/A 2/25/2016     Procedure: COLONOSCOPY;  Surgeon: Daryl Viramontes MD;  Location: [address];  Service: Endoscopy;  Laterality: N/A;  PM Prep       FEMUR SURGERY        HERNIA REPAIR         umbilical    TONSILLECTOMY                 Family History         Family History   Problem Relation Age of Onset    Heart disease Mother 60    Heart disease Father 45               Social History   .  Social History      Social History    Marital status:        Spouse name: N/A    Number of children: N/A    Years of education: N/A          Occupational History    Not on file.             Social History Main Topics    Smoking status: Never Smoker    Smokeless tobacco: Never Used    Alcohol use Yes         Comment: occasional    Drug use: No    Sexual activity: Not on file           Other Topics Concern    Not on file          Social History Narrative    No narrative on file            Allergies        Review of patient's allergies indicates:   Allergen Reactions    Lamisil [terbinafine hcl] Rash               Physical Exam          Vitals:     04/03/18 1414   BP: 106/63   Pulse: 88   Temp: 99.3 °F (37.4 °C)            Body mass index is 27.83 kg/m².        General: AOx3, NAD   Respiratory:  Symmetric chest rise, normal effort  Right Ear: External Auditory Canal WNL,TM w/o masses/lesions/perforations.  Middle ear without evidence of effusion.   Left Ear: External Auditory Canal WNL,TM w/o masses/lesions/perforations.  Middle ear without evidence of effusion.   Nose: No gross nasal septal deviation. Inferior Turbinates WNL bilaterally. No septal perforation. No masses/lesions.   Oral Cavity:  Oral Tongue mobile, no lesions noted. Hard Palate WNL. No buccal or FOM lesions.  Clear saliva from L Rickey's duct.  Oropharynx:  No masses/lesions of the posterior pharyngeal wall. Tonsillar fossa without lesions. Soft palate without masses. Midline uvula.   Neck: No scars.  No cervical lymphadenopathy, thyromegaly or thyroid nodules.  Normal range of motion.    Face: House Brackmann I bilaterally.      Neck MRI reviewed     Assessment/Plan      Problem List Items Addressed This Visit               ENT     Parotid swelling - Primary      "  L parotid swelling in the setting of Sjogren's disease.  Given his lack of symptoms, we have 2 options: observation vs sialendocopy.  He is interested in sialendoscopy.  He understands that there is a legitimate chance that this intervention will not improve his swelling.     I reviewed the risks and benefits of salivary endoscopy.  I explained that this procedure entails dilation of the salivary ducts and diagnostic endoscopy.  There is also the potential for intervention with removal of sialoliths, dilation of strictures, and application of corticosteroids.  I explained that any intervention would likely result in the placement of a stent.  This stent would be sutured to the inside of the mouth and left in place for 3 weeks.     I explained that there is a risk of ductal stenosis and resulting chronic sialadenitis, lingual nerve (submandibular) or facial nerve (parotid) injury, sialocele, salivary fistula, and need for conversion to an open procedure.  I explained the possibility of a "combined approach" in which both endoscopic and open techniques are utilized.  I also explained that in combined procedures of the parotid gland, a facial nerve monitor is used to ensure the safety of the facial nerve.     He understands and wishes to proceed with surgery.     I will contact him once we have secured the salivary endoscopy instruments.  We will schedule surgery at that time.              "

## 2018-05-14 NOTE — ANESTHESIA POSTPROCEDURE EVALUATION
"Anesthesia Post Evaluation    Patient: Van Jones    Procedure(s) Performed: Procedure(s) (LRB):  ENDOSCOPY (Left)    Final Anesthesia Type: general  Patient location during evaluation: PACU  Patient participation: Yes- Able to Participate  Level of consciousness: awake  Post-procedure vital signs: reviewed and stable  Pain management: adequate  Airway patency: patent  PONV status at discharge: No PONV  Anesthetic complications: no      Cardiovascular status: blood pressure returned to baseline and stable  Respiratory status: unassisted, spontaneous ventilation and room air  Hydration status: euvolemic  Follow-up not needed.        Visit Vitals  /61 (BP Location: Right arm, Patient Position: Lying)   Pulse 71   Temp 37.1 °C (98.8 °F) (Temporal)   Resp 16   Ht 5' 7" (1.702 m)   Wt 77.1 kg (170 lb)   SpO2 95%   BMI 26.63 kg/m²       Pain/Amrit Score: No Data Recorded      "

## 2018-05-28 ENCOUNTER — OFFICE VISIT (OUTPATIENT)
Dept: RHEUMATOLOGY | Facility: CLINIC | Age: 53
End: 2018-05-28
Payer: MEDICARE

## 2018-05-28 VITALS
HEART RATE: 85 BPM | RESPIRATION RATE: 18 BRPM | WEIGHT: 170 LBS | HEIGHT: 67 IN | SYSTOLIC BLOOD PRESSURE: 112 MMHG | DIASTOLIC BLOOD PRESSURE: 67 MMHG | BODY MASS INDEX: 26.68 KG/M2

## 2018-05-28 DIAGNOSIS — R60.9 PAROTID SWELLING: ICD-10-CM

## 2018-05-28 DIAGNOSIS — M35.00 SJOGREN'S SYNDROME, WITH UNSPECIFIED ORGAN INVOLVEMENT: Primary | ICD-10-CM

## 2018-05-28 PROCEDURE — 99214 OFFICE O/P EST MOD 30 MIN: CPT | Mod: S$PBB,,, | Performed by: INTERNAL MEDICINE

## 2018-05-28 PROCEDURE — 99999 PR PBB SHADOW E&M-EST. PATIENT-LVL III: CPT | Mod: PBBFAC,,, | Performed by: INTERNAL MEDICINE

## 2018-05-28 PROCEDURE — 99213 OFFICE O/P EST LOW 20 MIN: CPT | Mod: PBBFAC | Performed by: INTERNAL MEDICINE
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
## 2018-05-31 ENCOUNTER — OFFICE VISIT (OUTPATIENT)
Dept: OTOLARYNGOLOGY | Facility: CLINIC | Age: 53
End: 2018-05-31
Payer: MEDICARE

## 2018-05-31 VITALS
TEMPERATURE: 98 F | WEIGHT: 176.13 LBS | SYSTOLIC BLOOD PRESSURE: 98 MMHG | BODY MASS INDEX: 27.64 KG/M2 | DIASTOLIC BLOOD PRESSURE: 62 MMHG | HEIGHT: 67 IN | HEART RATE: 76 BPM

## 2018-05-31 DIAGNOSIS — R60.9 PAROTID SWELLING: Primary | ICD-10-CM

## 2018-05-31 PROCEDURE — 99999 PR PBB SHADOW E&M-EST. PATIENT-LVL III: CPT | Mod: PBBFAC,,, | Performed by: OTOLARYNGOLOGY

## 2018-05-31 PROCEDURE — 99024 POSTOP FOLLOW-UP VISIT: CPT | Mod: POP,,, | Performed by: OTOLARYNGOLOGY

## 2018-05-31 PROCEDURE — 99213 OFFICE O/P EST LOW 20 MIN: CPT | Mod: PBBFAC | Performed by: OTOLARYNGOLOGY

## 2018-06-03 NOTE — ASSESSMENT & PLAN NOTE
No improvement since sialendoscopy.  Mr. Jones will monitor this swelling for now.  He will return if it worsens or if he wishes to discuss parotidectomy.

## 2018-06-03 NOTE — PROGRESS NOTES
Chief Complaint   Patient presents with    Follow-up       HPI   52 y.o. male presents 3 weeks status post L parotid sialendoscopy for parotid swelling.  He reports no change in his symptoms since surgery. No complaints.     Review of Systems   Constitutional: Negative for fatigue and unexpected weight change.   HENT: Per HPI.  Eyes: Negative for visual disturbance.   Respiratory: Negative for shortness of breath, hemoptysis   Cardiovascular: Negative for chest pain and palpitations.   Musculoskeletal: Negative for decreased ROM, back pain.   Skin: Negative for rash, sunburn, itching.   Neurological: Negative for dizziness and seizures.   Hematological: Negative for adenopathy. Does not bruise/bleed easily.   Endocrine: Negative for rapid weight loss/weight gain, heat/cold intolerance.     Past Medical History   Patient Active Problem List   Diagnosis    Colon cancer screening    Cervical myelopathy    Depression    RSD (reflex sympathetic dystrophy)    History of pulmonary embolism    Sjogren's syndrome    HLD (hyperlipidemia)    NSAID long-term use    Parotid swelling    Parotitis           Past Surgical History   Past Surgical History:   Procedure Laterality Date    CERVICAL DISC ARTHROPLASTY  01/23/2018    C3/4    COLONOSCOPY N/A 2/25/2016    Procedure: COLONOSCOPY;  Surgeon: Daryl Viramontes MD;  Location: [address]);  Service: Endoscopy;  Laterality: N/A;  PM Prep      FEMUR SURGERY      HERNIA REPAIR      umbilical    TONSILLECTOMY           Family History   Family History   Problem Relation Age of Onset    Heart disease Mother 60    Heart disease Father 45           Social History   .  Social History     Social History    Marital status:      Spouse name: N/A    Number of children: N/A    Years of education: N/A     Occupational History    Not on file.     Social History Main Topics    Smoking status: Never Smoker    Smokeless tobacco: Never Used    Alcohol use Yes       Comment: occasional    Drug use: No    Sexual activity: Not on file     Other Topics Concern    Not on file     Social History Narrative    No narrative on file         Allergies   Review of patient's allergies indicates:   Allergen Reactions    Lamisil [terbinafine hcl] Rash           Physical Exam     Vitals:    05/31/18 0949   BP: 98/62   Pulse: 76   Temp: 97.5 °F (36.4 °C)         Body mass index is 27.59 kg/m².      General: AOx3, NAD   Respiratory:  Symmetric chest rise, normal effort  Oral Cavity:  Oral Tongue mobile, no lesions noted. Hard Palate WNL. No buccal or FOM lesions.  Clear saliva from L Rickey's duct.  Oropharynx:  No masses/lesions of the posterior pharyngeal wall. Tonsillar fossa without lesions. Soft palate without masses. Midline uvula.   Neck: No scars.  No cervical lymphadenopathy, thyromegaly or thyroid nodules.  Normal range of motion.    Face: House Brackmann I bilaterally.     Assessment/Plan  Problem List Items Addressed This Visit        ENT    Parotid swelling - Primary     No improvement since sialendoscopy.  Mr. Jones will monitor this swelling for now.  He will return if it worsens or if he wishes to discuss parotidectomy.

## 2018-06-04 ENCOUNTER — TELEPHONE (OUTPATIENT)
Dept: RHEUMATOLOGY | Facility: CLINIC | Age: 53
End: 2018-06-04

## 2018-06-04 RX ORDER — HYDROXYCHLOROQUINE SULFATE 200 MG/1
200 TABLET, FILM COATED ORAL 2 TIMES DAILY
Qty: 180 TABLET | Refills: 5 | Status: SHIPPED | OUTPATIENT
Start: 2018-06-04 | End: 2018-06-08 | Stop reason: SDUPTHER

## 2018-06-08 RX ORDER — HYDROXYCHLOROQUINE SULFATE 200 MG/1
200 TABLET, FILM COATED ORAL 2 TIMES DAILY
Qty: 180 TABLET | Refills: 5 | Status: SHIPPED | OUTPATIENT
Start: 2018-06-08 | End: 2019-02-01

## 2018-07-24 ENCOUNTER — TELEPHONE (OUTPATIENT)
Dept: ORTHOPEDICS | Facility: CLINIC | Age: 53
End: 2018-07-24

## 2018-07-24 DIAGNOSIS — Z98.1 S/P CERVICAL SPINAL FUSION: Primary | ICD-10-CM

## 2018-07-27 ENCOUNTER — HOSPITAL ENCOUNTER (OUTPATIENT)
Dept: RADIOLOGY | Facility: HOSPITAL | Age: 53
Discharge: HOME | End: 2018-07-27
Attending: ORTHOPAEDIC SURGERY
Payer: MEDICARE

## 2018-07-27 ENCOUNTER — OFFICE VISIT (OUTPATIENT)
Dept: ORTHOPEDICS | Facility: CLINIC | Age: 53
End: 2018-07-27
Payer: MEDICARE

## 2018-07-27 VITALS — WEIGHT: 169.44 LBS | HEIGHT: 67 IN | BODY MASS INDEX: 26.6 KG/M2

## 2018-07-27 DIAGNOSIS — Z98.1 S/P CERVICAL SPINAL FUSION: ICD-10-CM

## 2018-07-27 DIAGNOSIS — Z98.890 S/P CERVICAL DISC REPLACEMENT: Primary | ICD-10-CM

## 2018-07-27 PROCEDURE — 72040 X-RAY EXAM NECK SPINE 2-3 VW: CPT | Mod: 26,,, | Performed by: RADIOLOGY

## 2018-07-27 PROCEDURE — 99999 PR PBB SHADOW E&M-EST. PATIENT-LVL II: CPT | Mod: PBBFAC,,, | Performed by: ORTHOPAEDIC SURGERY

## 2018-07-27 PROCEDURE — 72040 X-RAY EXAM NECK SPINE 2-3 VW: CPT | Mod: TC

## 2018-07-27 PROCEDURE — 99212 OFFICE O/P EST SF 10 MIN: CPT | Mod: S$PBB,,, | Performed by: ORTHOPAEDIC SURGERY

## 2018-07-27 PROCEDURE — 99212 OFFICE O/P EST SF 10 MIN: CPT | Mod: PBBFAC,25 | Performed by: ORTHOPAEDIC SURGERY

## 2018-07-27 NOTE — PROGRESS NOTES
Date of Surgery: 1/23/18    Procedure: C3/4 disc replacement    History: Van Jones is seen today for follow-up following the above listed procedure. Overall the patient is doing great. No neck or arm pain reported today.     Exam: Incision is healed. There is no sign of infection. Neuro exam is stable. No signs of DVT.    Radiographs: Stable implants s/p C3/4 TDA.    Assessment/Plan: Doing well postoperatively. I will plan to see the patient back for the next postop visit at 1 year from surgery. Thank you for the opportunity to participate in this patient's care. Please give me a call if there are any concerns or questions.

## 2018-10-02 RX ORDER — PILOCARPINE HYDROCHLORIDE 5 MG/1
5 TABLET, FILM COATED ORAL 2 TIMES DAILY
Qty: 60 TABLET | Refills: 11 | Status: SHIPPED | OUTPATIENT
Start: 2018-10-02 | End: 2018-12-04

## 2018-11-06 ENCOUNTER — TELEPHONE (OUTPATIENT)
Dept: RHEUMATOLOGY | Facility: CLINIC | Age: 53
End: 2018-11-06

## 2018-11-06 NOTE — TELEPHONE ENCOUNTER
Reviewed outside eye exam   10/16/2018:   no retinopathy  Opthomologist Dr. Burr put in note that plaquenil 400mg po qday is not standard dose.  Per rheumatology dosing, this is standard.

## 2018-11-26 ENCOUNTER — TELEPHONE (OUTPATIENT)
Dept: NEUROLOGY | Facility: CLINIC | Age: 53
End: 2018-11-26

## 2018-11-26 NOTE — TELEPHONE ENCOUNTER
----- Message from Noa Lion sent at 11/26/2018  2:01 PM CST -----  Needs Advice    Reason for call:calling to schedule an appt with Dr. Jones. Please call.        Communication Preference:pt @ [phone] or send [employer] message    Additional Information:

## 2018-12-04 ENCOUNTER — PATIENT MESSAGE (OUTPATIENT)
Dept: RHEUMATOLOGY | Facility: CLINIC | Age: 53
End: 2018-12-04

## 2018-12-04 ENCOUNTER — LAB VISIT (OUTPATIENT)
Dept: LAB | Facility: HOSPITAL | Age: 53
End: 2018-12-04
Attending: INTERNAL MEDICINE
Payer: MEDICARE

## 2018-12-04 ENCOUNTER — OFFICE VISIT (OUTPATIENT)
Dept: RHEUMATOLOGY | Facility: CLINIC | Age: 53
End: 2018-12-04
Payer: MEDICARE

## 2018-12-04 VITALS
WEIGHT: 180 LBS | HEIGHT: 67 IN | BODY MASS INDEX: 28.25 KG/M2 | DIASTOLIC BLOOD PRESSURE: 73 MMHG | HEART RATE: 88 BPM | SYSTOLIC BLOOD PRESSURE: 107 MMHG

## 2018-12-04 DIAGNOSIS — M35.01 SJOGREN'S SYNDROME WITH KERATOCONJUNCTIVITIS SICCA: ICD-10-CM

## 2018-12-04 DIAGNOSIS — M35.00 SJOGREN'S SYNDROME, WITH UNSPECIFIED ORGAN INVOLVEMENT: Primary | ICD-10-CM

## 2018-12-04 DIAGNOSIS — M35.01 SJOGREN'S SYNDROME WITH KERATOCONJUNCTIVITIS SICCA: Primary | ICD-10-CM

## 2018-12-04 DIAGNOSIS — Z79.899 LONG-TERM USE OF PLAQUENIL: ICD-10-CM

## 2018-12-04 LAB
ALBUMIN SERPL BCP-MCNC: 4.1 G/DL
ALP SERPL-CCNC: 58 U/L
ALT SERPL W/O P-5'-P-CCNC: 17 U/L
ANION GAP SERPL CALC-SCNC: 9 MMOL/L
AST SERPL-CCNC: 14 U/L
BASOPHILS # BLD AUTO: 0.03 K/UL
BASOPHILS NFR BLD: 0.3 %
BILIRUB SERPL-MCNC: 0.4 MG/DL
BUN SERPL-MCNC: 14 MG/DL
CALCIUM SERPL-MCNC: 9.7 MG/DL
CHLORIDE SERPL-SCNC: 109 MMOL/L
CO2 SERPL-SCNC: 25 MMOL/L
CREAT SERPL-MCNC: 0.8 MG/DL
CRP SERPL-MCNC: 11.9 MG/L
DIFFERENTIAL METHOD: ABNORMAL
EOSINOPHIL # BLD AUTO: 0 K/UL
EOSINOPHIL NFR BLD: 0.2 %
ERYTHROCYTE [DISTWIDTH] IN BLOOD BY AUTOMATED COUNT: 12.2 %
ERYTHROCYTE [SEDIMENTATION RATE] IN BLOOD BY WESTERGREN METHOD: 24 MM/HR
EST. GFR  (AFRICAN AMERICAN): >60 ML/MIN/1.73 M^2
EST. GFR  (NON AFRICAN AMERICAN): >60 ML/MIN/1.73 M^2
GLUCOSE SERPL-MCNC: 80 MG/DL
HCT VFR BLD AUTO: 45.5 %
HGB BLD-MCNC: 15.2 G/DL
IMM GRANULOCYTES # BLD AUTO: 0.05 K/UL
IMM GRANULOCYTES NFR BLD AUTO: 0.4 %
LYMPHOCYTES # BLD AUTO: 1.4 K/UL
LYMPHOCYTES NFR BLD: 11.6 %
MCH RBC QN AUTO: 30.6 PG
MCHC RBC AUTO-ENTMCNC: 33.4 G/DL
MCV RBC AUTO: 92 FL
MONOCYTES # BLD AUTO: 1 K/UL
MONOCYTES NFR BLD: 8.4 %
NEUTROPHILS # BLD AUTO: 9.3 K/UL
NEUTROPHILS NFR BLD: 79.1 %
NRBC BLD-RTO: 0 /100 WBC
PLATELET # BLD AUTO: 233 K/UL
PMV BLD AUTO: 10 FL
POTASSIUM SERPL-SCNC: 3.7 MMOL/L
PROT SERPL-MCNC: 7.4 G/DL
RBC # BLD AUTO: 4.96 M/UL
SODIUM SERPL-SCNC: 143 MMOL/L
WBC # BLD AUTO: 11.71 K/UL

## 2018-12-04 PROCEDURE — 85025 COMPLETE CBC W/AUTO DIFF WBC: CPT

## 2018-12-04 PROCEDURE — 84165 PROTEIN E-PHORESIS SERUM: CPT

## 2018-12-04 PROCEDURE — 99213 OFFICE O/P EST LOW 20 MIN: CPT | Mod: PBBFAC | Performed by: INTERNAL MEDICINE

## 2018-12-04 PROCEDURE — 36415 COLL VENOUS BLD VENIPUNCTURE: CPT

## 2018-12-04 PROCEDURE — 84165 PROTEIN E-PHORESIS SERUM: CPT | Mod: 26,,, | Performed by: PATHOLOGY

## 2018-12-04 PROCEDURE — 99214 OFFICE O/P EST MOD 30 MIN: CPT | Mod: S$PBB,,, | Performed by: INTERNAL MEDICINE

## 2018-12-04 PROCEDURE — 80053 COMPREHEN METABOLIC PANEL: CPT

## 2018-12-04 PROCEDURE — 99999 PR PBB SHADOW E&M-EST. PATIENT-LVL III: CPT | Mod: PBBFAC,,, | Performed by: INTERNAL MEDICINE

## 2018-12-04 PROCEDURE — 86140 C-REACTIVE PROTEIN: CPT

## 2018-12-04 PROCEDURE — 85652 RBC SED RATE AUTOMATED: CPT

## 2018-12-04 RX ORDER — FLUTICASONE PROPIONATE 50 MCG
SPRAY, SUSPENSION (ML) NASAL
COMMUNITY
Start: 2018-12-03 | End: 2019-02-01

## 2018-12-04 RX ORDER — AZITHROMYCIN 250 MG/1
TABLET, FILM COATED ORAL
COMMUNITY
Start: 2018-12-03 | End: 2019-02-01

## 2018-12-04 RX ORDER — SERTRALINE HYDROCHLORIDE 50 MG/1
TABLET, FILM COATED ORAL NIGHTLY
COMMUNITY
Start: 2018-08-16 | End: 2022-04-25

## 2018-12-04 RX ORDER — LORAZEPAM 0.5 MG/1
TABLET ORAL NIGHTLY
COMMUNITY
Start: 2018-08-17 | End: 2020-10-30
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
## 2018-12-05 LAB
ALBUMIN SERPL ELPH-MCNC: 4.22 G/DL
ALPHA1 GLOB SERPL ELPH-MCNC: 0.44 G/DL
ALPHA2 GLOB SERPL ELPH-MCNC: 0.97 G/DL
B-GLOBULIN SERPL ELPH-MCNC: 0.79 G/DL
GAMMA GLOB SERPL ELPH-MCNC: 0.67 G/DL
PATHOLOGIST INTERPRETATION SPE: NORMAL
PROT SERPL-MCNC: 7.1 G/DL

## 2019-01-18 ENCOUNTER — TELEPHONE (OUTPATIENT)
Dept: ORTHOPEDICS | Facility: CLINIC | Age: 54
End: 2019-01-18

## 2019-01-18 DIAGNOSIS — Z98.890 S/P SPINAL SURGERY: Primary | ICD-10-CM

## 2019-02-01 ENCOUNTER — HOSPITAL ENCOUNTER (OUTPATIENT)
Dept: RADIOLOGY | Facility: HOSPITAL | Age: 54
Discharge: HOME | End: 2019-02-01
Attending: ORTHOPAEDIC SURGERY
Payer: MEDICARE

## 2019-02-01 ENCOUNTER — OFFICE VISIT (OUTPATIENT)
Dept: ORTHOPEDICS | Facility: CLINIC | Age: 54
End: 2019-02-01
Payer: MEDICARE

## 2019-02-01 VITALS — HEIGHT: 67 IN | BODY MASS INDEX: 28.23 KG/M2 | WEIGHT: 179.88 LBS

## 2019-02-01 DIAGNOSIS — Z98.890 S/P SPINAL SURGERY: ICD-10-CM

## 2019-02-01 DIAGNOSIS — G95.9 CERVICAL MYELOPATHY: Primary | ICD-10-CM

## 2019-02-01 PROCEDURE — 99999 PR PBB SHADOW E&M-EST. PATIENT-LVL III: CPT | Mod: PBBFAC,,, | Performed by: ORTHOPAEDIC SURGERY

## 2019-02-01 PROCEDURE — 72040 X-RAY EXAM NECK SPINE 2-3 VW: CPT | Mod: 26,,, | Performed by: RADIOLOGY

## 2019-02-01 PROCEDURE — 72040 XR CERVICAL SPINE AP LATERAL: ICD-10-PCS | Mod: 26,,, | Performed by: RADIOLOGY

## 2019-02-01 PROCEDURE — 99213 OFFICE O/P EST LOW 20 MIN: CPT | Mod: PBBFAC,25 | Performed by: ORTHOPAEDIC SURGERY

## 2019-02-01 PROCEDURE — 99212 PR OFFICE/OUTPT VISIT, EST, LEVL II, 10-19 MIN: ICD-10-PCS | Mod: S$PBB,,, | Performed by: ORTHOPAEDIC SURGERY

## 2019-02-01 PROCEDURE — 99212 OFFICE O/P EST SF 10 MIN: CPT | Mod: S$PBB,,, | Performed by: ORTHOPAEDIC SURGERY

## 2019-02-01 PROCEDURE — 99999 PR PBB SHADOW E&M-EST. PATIENT-LVL III: ICD-10-PCS | Mod: PBBFAC,,, | Performed by: ORTHOPAEDIC SURGERY

## 2019-02-01 PROCEDURE — 72040 X-RAY EXAM NECK SPINE 2-3 VW: CPT | Mod: TC

## 2019-02-04 NOTE — PROGRESS NOTES
Date of Surgery: 1/23/18    Procedure: C3/4 disc replacement    History: Van Jones is seen today for follow-up following the above listed procedure. Overall the patient is doing great. No neck or arm pain reported today.     Exam: Incision is healed. There is no sign of infection. Neuro exam is stable. No signs of DVT.    Radiographs: Stable implants s/p C3/4 TDA.    Assessment/Plan: Doing well postoperatively. I will plan to see the patient back for the next postop visit at 2 years from surgery. Thank you for the opportunity to participate in this patient's care. Please give me a call if there are any concerns or questions.    I spent 10 minutes with the patient of which greater than 1/2 the time was devoted to counciling the patient regarding treatment options.

## 2019-04-20 ENCOUNTER — PATIENT MESSAGE (OUTPATIENT)
Dept: OTOLARYNGOLOGY | Facility: CLINIC | Age: 54
End: 2019-04-20

## 2019-04-20 ENCOUNTER — PATIENT MESSAGE (OUTPATIENT)
Dept: RHEUMATOLOGY | Facility: CLINIC | Age: 54
End: 2019-04-20

## 2019-05-02 ENCOUNTER — OFFICE VISIT (OUTPATIENT)
Dept: OTOLARYNGOLOGY | Facility: CLINIC | Age: 54
End: 2019-05-02
Payer: MEDICARE

## 2019-05-02 VITALS
HEART RATE: 76 BPM | BODY MASS INDEX: 28.66 KG/M2 | SYSTOLIC BLOOD PRESSURE: 110 MMHG | TEMPERATURE: 98 F | WEIGHT: 183 LBS | DIASTOLIC BLOOD PRESSURE: 75 MMHG

## 2019-05-02 DIAGNOSIS — R60.9 PAROTID SWELLING: Primary | ICD-10-CM

## 2019-05-02 PROCEDURE — 64611 CHEMODENERV SALIV GLANDS: CPT | Mod: S$PBB,,, | Performed by: OTOLARYNGOLOGY

## 2019-05-02 PROCEDURE — 64611 CHEMODENERV SALIV GLANDS: CPT | Mod: PBBFAC | Performed by: OTOLARYNGOLOGY

## 2019-05-02 PROCEDURE — 99213 OFFICE O/P EST LOW 20 MIN: CPT | Mod: 25,S$PBB,, | Performed by: OTOLARYNGOLOGY

## 2019-05-02 PROCEDURE — 99999 PR PBB SHADOW E&M-EST. PATIENT-LVL III: CPT | Mod: PBBFAC,,, | Performed by: OTOLARYNGOLOGY

## 2019-05-02 PROCEDURE — 64611 PR CHEMODENERVATION PAROTID/SUBMANDIBULAR SALIVARY GLANDS,BILATERAL: ICD-10-PCS | Mod: S$PBB,,, | Performed by: OTOLARYNGOLOGY

## 2019-05-02 PROCEDURE — 99999 PR PBB SHADOW E&M-EST. PATIENT-LVL III: ICD-10-PCS | Mod: PBBFAC,,, | Performed by: OTOLARYNGOLOGY

## 2019-05-02 PROCEDURE — 99213 PR OFFICE/OUTPT VISIT, EST, LEVL III, 20-29 MIN: ICD-10-PCS | Mod: 25,S$PBB,, | Performed by: OTOLARYNGOLOGY

## 2019-05-02 PROCEDURE — 99213 OFFICE O/P EST LOW 20 MIN: CPT | Mod: PBBFAC,25 | Performed by: OTOLARYNGOLOGY

## 2019-05-03 NOTE — PROGRESS NOTES
Chief Complaint   Patient presents with    consult/ consult for botox for parotid gland       HPI   53 y.o. male presents 11 mos status post L parotid sialendoscopy for parotid swelling.  He reports no change in his symptoms since surgery. No complaints.  He wishes to discuss Botox injection in effort to reduce the size is left parotid gland.    Review of Systems   Constitutional: Negative for fatigue and unexpected weight change.   HENT: Per HPI.  Eyes: Negative for visual disturbance.   Respiratory: Negative for shortness of breath, hemoptysis   Cardiovascular: Negative for chest pain and palpitations.   Musculoskeletal: Negative for decreased ROM, back pain.   Skin: Negative for rash, sunburn, itching.   Neurological: Negative for dizziness and seizures.   Hematological: Negative for adenopathy. Does not bruise/bleed easily.   Endocrine: Negative for rapid weight loss/weight gain, heat/cold intolerance.     Past Medical History   Patient Active Problem List   Diagnosis    Colon cancer screening    Cervical myelopathy    Depression    RSD (reflex sympathetic dystrophy)    History of pulmonary embolism    Sjogren's syndrome    HLD (hyperlipidemia)    NSAID long-term use    Parotid swelling    Parotitis           Past Surgical History   Past Surgical History:   Procedure Laterality Date    CERVICAL DISC ARTHROPLASTY  01/23/2018    C3/4    CERVICAL TOTAL DISC REPLACEMENT, C3/4  N/A 1/23/2018    Performed by [facility] at [facility] OR 2ND FLR    COLONOSCOPY N/A 2/25/2016    Performed by Daryl Viramontes MD at [facility] ENDO (4TH FLR)    ENDOSCOPY Left 5/11/2018    Performed by Elver Amador MD at [facility] OR 2ND FLR    FEMUR SURGERY      HERNIA REPAIR      umbilical    TONSILLECTOMY           Family History   Family History   Problem Relation Age of Onset    Heart disease Mother 60    Heart disease Father 45           Social History   .  Social History     Socioeconomic History    Marital status:       Spouse name: Not on file    Number of children: Not on file    Years of education: Not on file    Highest education level: Not on file   Occupational History    Not on file   Social Needs    Financial resource strain: Not on file    Food insecurity:     Worry: Not on file     Inability: Not on file    Transportation needs:     Medical: Not on file     Non-medical: Not on file   Tobacco Use    Smoking status: Never Smoker    Smokeless tobacco: Never Used   Substance and Sexual Activity    Alcohol use: Yes     Comment: occasional    Drug use: No    Sexual activity: Not on file   Lifestyle    Physical activity:     Days per week: Not on file     Minutes per session: Not on file    Stress: Not on file   Relationships    Social connections:     Talks on phone: Not on file     Gets together: Not on file     Attends Quaker service: Not on file     Active member of club or organization: Not on file     Attends meetings of clubs or organizations: Not on file     Relationship status: Not on file   Other Topics Concern    Not on file   Social History Narrative    Not on file         Allergies   Review of patient's allergies indicates:   Allergen Reactions    Lamisil [terbinafine hcl] Rash           Physical Exam     Vitals:    05/02/19 1018   BP: 110/75   Pulse: 76   Temp: 98.4 °F (36.9 °C)         Body mass index is 28.66 kg/m².      General: AOx3, NAD   Respiratory:  Symmetric chest rise, normal effort  Oral Cavity:  Oral Tongue mobile, no lesions noted. Hard Palate WNL. No buccal or FOM lesions.  Clear saliva from L Rickey's duct.  Oropharynx:  No masses/lesions of the posterior pharyngeal wall. Tonsillar fossa without lesions. Soft palate without masses. Midline uvula.   Neck: No scars.  No cervical lymphadenopathy, thyromegaly or thyroid nodules.  Normal range of motion.  Left parotid enlarged.  No focal masses.  Face: House Brackmann I bilaterally.     32 units of Botox injected into  left parotid under ultrasound guidance.  Patient tolerated procedure well.    Assessment/Plan  Problem List Items Addressed This Visit        ENT    Parotid swelling - Primary     Etiology uncertain.  No focal lesions. I explained that injecting Botox was a reasonable choice.  There is some literature to support use in the setting of unilateral parotid swelling.  We performed this procedure today.  He will contact me should he wish to repeat the injection or with further questions.

## 2019-05-03 NOTE — ASSESSMENT & PLAN NOTE
Etiology uncertain.  No focal lesions. I explained that injecting Botox was a reasonable choice.  There is some literature to support use in the setting of unilateral parotid swelling.  We performed this procedure today.  He will contact me should he wish to repeat the injection or with further questions.

## 2019-05-07 ENCOUNTER — PATIENT MESSAGE (OUTPATIENT)
Dept: NEUROLOGY | Facility: CLINIC | Age: 54
End: 2019-05-07

## 2019-05-08 ENCOUNTER — OFFICE VISIT (OUTPATIENT)
Dept: NEUROLOGY | Facility: CLINIC | Age: 54
End: 2019-05-08
Payer: MEDICARE

## 2019-05-08 ENCOUNTER — HOSPITAL ENCOUNTER (OUTPATIENT)
Dept: RADIOLOGY | Facility: OTHER | Age: 54
Discharge: HOME | End: 2019-05-08
Attending: PSYCHIATRY & NEUROLOGY
Payer: MEDICARE

## 2019-05-08 VITALS
SYSTOLIC BLOOD PRESSURE: 128 MMHG | WEIGHT: 186.31 LBS | HEART RATE: 82 BPM | BODY MASS INDEX: 29.24 KG/M2 | DIASTOLIC BLOOD PRESSURE: 81 MMHG | HEIGHT: 67 IN

## 2019-05-08 DIAGNOSIS — E78.5 HYPERLIPIDEMIA, UNSPECIFIED HYPERLIPIDEMIA TYPE: ICD-10-CM

## 2019-05-08 DIAGNOSIS — R41.9 UNSPECIFIED SYMPTOMS AND SIGNS INVOLVING COGNITIVE FUNCTIONS AND AWARENESS: Primary | ICD-10-CM

## 2019-05-08 DIAGNOSIS — F32.A DEPRESSION, UNSPECIFIED DEPRESSION TYPE: ICD-10-CM

## 2019-05-08 DIAGNOSIS — M35.00 SJOGREN'S SYNDROME, WITH UNSPECIFIED ORGAN INVOLVEMENT: ICD-10-CM

## 2019-05-08 DIAGNOSIS — R41.9 UNSPECIFIED SYMPTOMS AND SIGNS INVOLVING COGNITIVE FUNCTIONS AND AWARENESS: ICD-10-CM

## 2019-05-08 PROBLEM — R41.89 OTHER SYMPTOMS AND SIGNS INVOLVING COGNITIVE FUNCTIONS AND AWARENESS: Status: ACTIVE | Noted: 2019-05-08

## 2019-05-08 PROCEDURE — 70551 MRI BRAIN STEM W/O DYE: CPT | Mod: 26,,, | Performed by: RADIOLOGY

## 2019-05-08 PROCEDURE — 99999 PR PBB SHADOW E&M-EST. PATIENT-LVL III: CPT | Mod: PBBFAC,,, | Performed by: PSYCHIATRY & NEUROLOGY

## 2019-05-08 PROCEDURE — 70551 MRI BRAIN STEM W/O DYE: CPT | Mod: TC

## 2019-05-08 PROCEDURE — 99204 PR OFFICE/OUTPT VISIT, NEW, LEVL IV, 45-59 MIN: ICD-10-PCS | Mod: S$PBB,,, | Performed by: PSYCHIATRY & NEUROLOGY

## 2019-05-08 PROCEDURE — 99204 OFFICE O/P NEW MOD 45 MIN: CPT | Mod: S$PBB,,, | Performed by: PSYCHIATRY & NEUROLOGY

## 2019-05-08 PROCEDURE — 99999 PR PBB SHADOW E&M-EST. PATIENT-LVL III: ICD-10-PCS | Mod: PBBFAC,,, | Performed by: PSYCHIATRY & NEUROLOGY

## 2019-05-08 PROCEDURE — 99213 OFFICE O/P EST LOW 20 MIN: CPT | Mod: PBBFAC | Performed by: PSYCHIATRY & NEUROLOGY

## 2019-05-08 PROCEDURE — 70551 MRI BRAIN WITHOUT CONTRAST: ICD-10-PCS | Mod: 26,,, | Performed by: RADIOLOGY

## 2019-05-08 RX ORDER — AZELASTINE 1 MG/ML
SPRAY, METERED NASAL
Refills: 0 | COMMUNITY
Start: 2019-04-11 | End: 2020-02-12
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
## 2019-05-08 NOTE — PATIENT INSTRUCTIONS
Discussed with patient. His cognitive difficulties may be related to attention span and concentration difficulty however the possibility of mild cognitive impairment on a vascular basis needs to be considered.  Sjogren's syndrome can be associated with microvascular cerebrovascular disease, which needs to be ruled out.  In addition, other contributing factors would include a history of depression.  His only vascular risk factor is hyperlipidemia.  Will get blood work to include B12/folate/RPR.  Will get an MRI scan of the brain, noncontrast and set up neuropsychological testing.  Will contact patient after the neuropsychological testing results are available so as to set up a follow-up appointment.

## 2019-05-24 ENCOUNTER — HOSPITAL ENCOUNTER (OUTPATIENT)
Dept: RADIOLOGY | Facility: HOSPITAL | Age: 54
Discharge: HOME | End: 2019-05-24
Attending: PHYSICIAN ASSISTANT
Payer: MEDICARE

## 2019-05-24 ENCOUNTER — OFFICE VISIT (OUTPATIENT)
Dept: ORTHOPEDICS | Facility: CLINIC | Age: 54
End: 2019-05-24
Payer: MEDICARE

## 2019-05-24 VITALS
BODY MASS INDEX: 28.88 KG/M2 | HEIGHT: 67 IN | DIASTOLIC BLOOD PRESSURE: 72 MMHG | WEIGHT: 184 LBS | HEART RATE: 112 BPM | SYSTOLIC BLOOD PRESSURE: 110 MMHG

## 2019-05-24 DIAGNOSIS — M25.511 ACUTE PAIN OF RIGHT SHOULDER: Primary | ICD-10-CM

## 2019-05-24 DIAGNOSIS — M25.511 ACUTE PAIN OF RIGHT SHOULDER: ICD-10-CM

## 2019-05-24 DIAGNOSIS — M25.311 INSTABILITY OF RIGHT SHOULDER JOINT: ICD-10-CM

## 2019-05-24 PROCEDURE — 99214 PR OFFICE/OUTPT VISIT, EST, LEVL IV, 30-39 MIN: ICD-10-PCS | Mod: S$PBB,,, | Performed by: PHYSICIAN ASSISTANT

## 2019-05-24 PROCEDURE — 99214 OFFICE O/P EST MOD 30 MIN: CPT | Mod: S$PBB,,, | Performed by: PHYSICIAN ASSISTANT

## 2019-05-24 PROCEDURE — 99214 OFFICE O/P EST MOD 30 MIN: CPT | Mod: PBBFAC,25 | Performed by: PHYSICIAN ASSISTANT

## 2019-05-24 PROCEDURE — 73030 XR SHOULDER COMPLETE 2 OR MORE VIEWS RIGHT: ICD-10-PCS | Mod: 26,RT,, | Performed by: RADIOLOGY

## 2019-05-24 PROCEDURE — 73221 MRI SHOULDER WITHOUT CONTRAST RIGHT: ICD-10-PCS | Mod: 26,RT,, | Performed by: RADIOLOGY

## 2019-05-24 PROCEDURE — 99999 PR PBB SHADOW E&M-EST. PATIENT-LVL IV: ICD-10-PCS | Mod: PBBFAC,,, | Performed by: PHYSICIAN ASSISTANT

## 2019-05-24 PROCEDURE — 73221 MRI JOINT UPR EXTREM W/O DYE: CPT | Mod: 26,RT,, | Performed by: RADIOLOGY

## 2019-05-24 PROCEDURE — 73030 X-RAY EXAM OF SHOULDER: CPT | Mod: TC,RT

## 2019-05-24 PROCEDURE — 73221 MRI JOINT UPR EXTREM W/O DYE: CPT | Mod: TC,RT

## 2019-05-24 PROCEDURE — 73030 X-RAY EXAM OF SHOULDER: CPT | Mod: 26,RT,, | Performed by: RADIOLOGY

## 2019-05-24 PROCEDURE — 99999 PR PBB SHADOW E&M-EST. PATIENT-LVL IV: CPT | Mod: PBBFAC,,, | Performed by: PHYSICIAN ASSISTANT

## 2019-05-24 RX ORDER — MELOXICAM 15 MG/1
15 TABLET ORAL DAILY
Qty: 30 TABLET | Refills: 1 | Status: SHIPPED | OUTPATIENT
Start: 2019-05-24 | End: 2019-06-23

## 2019-05-24 NOTE — PROGRESS NOTES
SUBJECTIVE:     Chief Complaint & History of Present Illness:  Van Jones is a  New  patient 53 y.o. male who is seen here today with a complaint of  right shoulder pain .  Patient is here today for evaluation treatment of sudden onset pain in the lateral and posterior aspects of the right shoulder for the past several days.  States he developed pain and tenderness in the shoulder while trying to lift and push a riding more from a ditch few days ago he has had difficulty with the lifting caring any movements greater than shoulder height.  Has treated conservatively with ice and rest with poor results  On a scale of 1-10, with 10 being worst pain imaginable, he rates this pain as 5 on good days and 9 on bad days.  he describes the pain as tender.    Review of patient's allergies indicates:   Allergen Reactions    Lamisil [terbinafine hcl] Rash         Current Outpatient Medications   Medication Sig Dispense Refill    aspirin (ECOTRIN) 81 MG EC tablet Take 81 mg by mouth once daily.      buPROPion (WELLBUTRIN XL) 300 MG 24 hr tablet Take 300 mg by mouth once daily.       LORazepam (ATIVAN) 0.5 MG tablet       rosuvastatin (CRESTOR) 10 MG tablet Take 10 mg by mouth every evening.       sertraline (ZOLOFT) 50 MG tablet       azelastine (ASTELIN) 137 mcg (0.1 %) nasal spray U 1 SPR IEN BID  0    meloxicam (MOBIC) 15 MG tablet Take 1 tablet (15 mg total) by mouth once daily. 30 tablet 1     No current facility-administered medications for this visit.        Past Medical History:   Diagnosis Date    Broken femur     Deep vein thrombosis     Hyperlipidemia     Nerve damage left    Pulmonary embolism     Reflex sympathetic dystrophy 1983    Left leg after broken femur surgery       Past Surgical History:   Procedure Laterality Date    CERVICAL DISC ARTHROPLASTY  01/23/2018    C3/4    CERVICAL TOTAL DISC REPLACEMENT, C3/4  N/A 1/23/2018    Per[facility] OR [address] [place] [address]     "COLONOSCOPY N/A 2/25/2016    Performed by Daryl [facility]ontes MD at [facility] ENDO (4TH FLR)    ENDOSCOPY Left 5/11/2018    Performed by Elver Amador MD at [facility] OR 2ND FLR    FEMUR SURGERY      HERNIA REPAIR      umbilical    TONSILLECTOMY         Vital Signs (Most Recent)  Vitals:    05/24/19 1416   BP: 110/72   Pulse: (!) 112       Review of Systems:  ROS:  Constitutional: no fever or chills  Eyes: no visual changes  ENT: no nasal congestion or sore throat, Parotitis  Respiratory: no cough or shortness of breath  Cardiovascular: no chest pain or palpitations  Gastrointestinal: no nausea or vomiting, tolerating diet  Genitourinary: no hematuria or dysuria  Integument/Breast: no rash or pruritis  Hematologic/Lymphatic: no easy bruising or lymphadenopathy, Sjogren's syndrome history of PE, DVT  Musculoskeletal: no arthralgias or myalgias, Positive history of femur fracture  Neurological: no seizures or tremors, Positive RSD, cervical myopathy  Behavioral/Psych: no auditory or visual hallucinations, Positive for depression  Endocrine: no heat or cold intolerance      OBJECTIVE:     PHYSICAL EXAM:  Height: 5' 7" (170.2 cm) Weight: 83.4 kg (183 lb 15.6 oz), General Appearance: Well nourished, well developed, in no acute distress.  Neurological: Mood & affect are normal.  Shoulder exam: right  Tenderness: lateral acromial, posterior acromial  ROM: forward flexion 180/180, extension 45/45, full abduction 180/180, abduction-glenohumeral 90/90, external rotation 50/50, pain at the extremes of mobility  Shoulder Strength: biceps 5/5, triceps 5/5, abduction 4/5, adduction 5/5, external rotation with shoulder at side 4/5, flexion 4/5, extension 4/5  negative for tenderness about the glenohumeral joint, positive for tenderness over the acromioclavicular joint and negative for impingement sign  Stability tests: anterior apprehension test positive for pain only and positive for apprehension and posterior apprehension test " positive for pain only and positive for apprehension  Special Tests:Cross-chest abduction: diffuse pain                     RADIOGRAPHS:  X-rays taken today films reviewed by me demonstrate no evidence of fracture dislocation or about the shoulder there is minor glenohumeral joint space narrowing in the humerus is low riding within the joint capsule no evidence of AC separation air advanced degenerative joint disease    ASSESSMENT/PLAN:       ICD-10-CM ICD-9-CM   1. Acute pain of right shoulder M25.511 719.41   2. Instability of right shoulder joint M25.311 718.81       Plan: We discussed with the patient at length all the different treatment options available for his rightshoulder including anti-inflammatories, acetaminophen, rest, ice, Physical therapy to include strengthening exercise, occasional cortisone injections for temporary relief, arthroscopic surgical repair, and finally shoulder arthroplasty.   Light of positive exam instability will proceed with MRI of the right shoulder  I will seen back following the MRI  No lifting note caring continue with ice NSAIDs and rest  Meloxicam 15 mg q.d. with food

## 2019-05-28 ENCOUNTER — OFFICE VISIT (OUTPATIENT)
Dept: ORTHOPEDICS | Facility: CLINIC | Age: 54
End: 2019-05-28
Payer: MEDICARE

## 2019-05-28 VITALS
WEIGHT: 183.88 LBS | HEART RATE: 82 BPM | HEIGHT: 67 IN | SYSTOLIC BLOOD PRESSURE: 120 MMHG | DIASTOLIC BLOOD PRESSURE: 77 MMHG | BODY MASS INDEX: 28.86 KG/M2

## 2019-05-28 DIAGNOSIS — S46.219A BICEPS TENDON TEAR: Primary | ICD-10-CM

## 2019-05-28 PROCEDURE — 99213 PR OFFICE/OUTPT VISIT, EST, LEVL III, 20-29 MIN: ICD-10-PCS | Mod: S$PBB,,, | Performed by: PHYSICIAN ASSISTANT

## 2019-05-28 PROCEDURE — 99999 PR PBB SHADOW E&M-EST. PATIENT-LVL IV: CPT | Mod: PBBFAC,,, | Performed by: PHYSICIAN ASSISTANT

## 2019-05-28 PROCEDURE — 99999 PR PBB SHADOW E&M-EST. PATIENT-LVL IV: ICD-10-PCS | Mod: PBBFAC,,, | Performed by: PHYSICIAN ASSISTANT

## 2019-05-28 PROCEDURE — 99214 OFFICE O/P EST MOD 30 MIN: CPT | Mod: PBBFAC | Performed by: PHYSICIAN ASSISTANT

## 2019-05-28 PROCEDURE — 99213 OFFICE O/P EST LOW 20 MIN: CPT | Mod: S$PBB,,, | Performed by: PHYSICIAN ASSISTANT

## 2019-05-28 NOTE — PROGRESS NOTES
SUBJECTIVE:     Chief Complaint & History of Present Illness:  Van Jones is a  Established  patient 53 y.o. male who is seen here today with a complaint of  right shoulder pain  Chief Complaint   Patient presents with    Right Shoulder - Pain    .  He has patient well-known to me was last seen treated the clinic for this condition 05/24/2019 at which time we had opted to send him for MRI of the right shoulder to evaluate for possible internal derangement.  He returns today to review his MRI discuss treatment options  On a scale of 1-10, with 10 being worst pain imaginable, he rates this pain as 5 on good days and 9 on bad days.  he describes the pain as tender.    Review of patient's allergies indicates:   Allergen Reactions    Lamisil [terbinafine hcl] Rash         Current Outpatient Medications   Medication Sig Dispense Refill    aspirin (ECOTRIN) 81 MG EC tablet Take 81 mg by mouth once daily.      azelastine (ASTELIN) 137 mcg (0.1 %) nasal spray U 1 SPR IEN BID  0    buPROPion (WELLBUTRIN XL) 300 MG 24 hr tablet Take 300 mg by mouth once daily.       LORazepam (ATIVAN) 0.5 MG tablet       meloxicam (MOBIC) 15 MG tablet Take 1 tablet (15 mg total) by mouth once daily. 30 tablet 1    rosuvastatin (CRESTOR) 10 MG tablet Take 10 mg by mouth every evening.       sertraline (ZOLOFT) 50 MG tablet        No current facility-administered medications for this visit.        Past Medical History:   Diagnosis Date    Broken femur     Deep vein thrombosis     Hyperlipidemia     Nerve damage left    Pulmonary embolism     Reflex sympathetic dystrophy 1983    Left leg after broken femur surgery       Past Surgical History:   Procedure Laterality Date    CERVICAL DISC ARTHROPLASTY  01/23/2018    C3/4    CERVICAL TOTAL DISC REPLACEMENT, C3/4  N/A 1/23/2018    Per[facility] at [facility] 2ND FLR    COLONOSCOPY N/A 2/25/2016    Performed by Daryl [facility] at [facility] ENDO (4TH FLR)     "ENDOSCOPY Left 5/11/2018    [facility] OR [address] [place] [address]    FEMUR SURGERY      HERNIA REPAIR      umbilical    TONSILLECTOMY         Vital Signs (Most Recent)  Vitals:    05/28/19 1325   BP: 120/77   Pulse: 82       Review of Systems:  ROS:  Constitutional: no fever or chills  Eyes: no visual changes  ENT: no nasal congestion or sore throat, Parotitis  Respiratory: no cough or shortness of breath  Cardiovascular: no chest pain or palpitations  Gastrointestinal: no nausea or vomiting, tolerating diet  Genitourinary: no hematuria or dysuria  Integument/Breast: no rash or pruritis  Hematologic/Lymphatic: no easy bruising or lymphadenopathy, Sjogren's syndrome history of PE, DVT  Musculoskeletal: no arthralgias or myalgias, Positive history of femur fracture  Neurological: no seizures or tremors, Positive RSD, cervical myopathy  Behavioral/Psych: no auditory or visual hallucinations, Positive for depression  Endocrine: no heat or cold intolerance       OBJECTIVE:     PHYSICAL EXAM:  Height: 5' 7" (170.2 cm) Weight: 83.4 kg (183 lb 13.8 oz), General Appearance: Well nourished, well developed, in no acute distress.  Neurological: Mood & affect are normal.  Shoulder exam: right  Tenderness: lateral acromial, posterior acromial  ROM: forward flexion 180/180, extension 45/45, full abduction 180/180, abduction-glenohumeral 90/90, external rotation 50/50, pain at the extremes of mobility  Shoulder Strength: biceps 5/5, triceps 5/5, abduction 4/5, adduction 5/5, external rotation with shoulder at side 4/5, flexion 4/5, extension 4/5  negative for tenderness about the glenohumeral joint, positive for tenderness over the acromioclavicular joint and negative for impingement sign  Stability tests: anterior apprehension test positive for pain only and positive for apprehension and posterior apprehension test positive for pain only and positive for apprehension  Special Tests:Cross-chest abduction: " diffuse pain                     RADIOGRAPHS:  Review of MRI demonstratesTear of intra-articular biceps tendon with large body in the proximal biceps tendon sheath likely representing tendon fragment    ASSESSMENT/PLAN:       ICD-10-CM ICD-9-CM   1. Biceps tendon tear S46.219A 840.8       Plan: We discussed with the patient at length all the different treatment options available for his rightshoulder including anti-inflammatories, acetaminophen, rest, ice, Physical therapy to include strengthening exercise, occasional cortisone injections for temporary relief, arthroscopic surgical repair, and finally shoulder arthroplasty.   Patient like consult to Sports Medicine to discuss the treatment options to include surgical intervention

## 2019-05-30 NOTE — PROGRESS NOTES
CC: RIGHT shoulder pain     53 y.o. Male presents as a new patient to me. Ambidextrous. Complaint today is right anterior shoulder pain x 5 weeks. States he lifted a stalled riding  out of a ditch and then pushed it about 15 yards. Denies feeling a pop, but reports a rapid onset of significant pain about 30 minutes after. Pain is worse with casting a fishing sky, overhead activity, reaching away from his body. Pain is disruptive of sleep at night. Better with rest. Denies neck pain or radicular symptoms. Treatment thus far has included activity modifications, rest, and oral medication.  Here today to discuss diagnosis and treatment options.     PMHx notable for L leg reflex sympathetic dystrophy, PE.  Not currently on any anticoagulants.  Negative for tobacco.   Negative for diabetes.     Pain Score:   6    PAST MEDICAL HISTORY:   Past Medical History:   Diagnosis Date    Broken femur     Deep vein thrombosis     Hyperlipidemia     Nerve damage left    Pulmonary embolism     Reflex sympathetic dystrophy 1983    Left leg after broken femur surgery       PAST SURGICAL HISTORY:  Past Surgical History:   Procedure Laterality Date    CERVICAL DISC ARTHROPLASTY  01/23/2018    C3/4    CERVICAL TOTAL DISC REPLACEMENT, C3/4  N/A 1/23/2018    Performed by [facility]em [facility]uera MD at [facility] OR 2ND FLR    COLONOSCOPY N/A 2/25/2016    Performed by Daryl Viramontes MD at [facility] ENDO (4TH FLR)    ENDOSCOPY Left 5/11/2018    Performed by Elver Amador MD at [facility] OR 2ND FLR    FEMUR SURGERY      HERNIA REPAIR      umbilical    TONSILLECTOMY         FAMILY HISTORY:  Family History   Problem Relation Age of Onset    Heart disease Mother 60    Neuropathy Mother     Heart disease Father 45       MEDICATIONS:    Current Outpatient Medications:     aspirin (ECOTRIN) 81 MG EC tablet, Take 81 mg by mouth once daily., Disp: , Rfl:     azelastine (ASTELIN) 137 mcg (0.1 %) nasal spray, U 1 SPR IEN BID, Disp: , Rfl:  "0    buPROPion (WELLBUTRIN XL) 300 MG 24 hr tablet, Take 300 mg by mouth once daily. , Disp: , Rfl:     LORazepam (ATIVAN) 0.5 MG tablet, , Disp: , Rfl:     meloxicam (MOBIC) 15 MG tablet, Take 1 tablet (15 mg total) by mouth once daily., Disp: 30 tablet, Rfl: 1    rosuvastatin (CRESTOR) 10 MG tablet, Take 10 mg by mouth every evening. , Disp: , Rfl:     sertraline (ZOLOFT) 50 MG tablet, , Disp: , Rfl:     ALLERGIES:  Review of patient's allergies indicates:   Allergen Reactions    Lamisil [terbinafine hcl] Rash          REVIEW OF SYSTEMS:  Constitution: Negative. Negative for chills, fever and night sweats.    Hematologic/Lymphatic: Negative for bleeding problem. Does not bruise/bleed easily.   Skin: Negative for dry skin, itching and rash.   Musculoskeletal: Negative for falls. Positive for right shoulder pain and  muscle weakness.     All other review of symptoms were reviewed and found to be noncontributory.     PHYSICAL EXAMINATION:  Vitals:  /73   Pulse 98   Ht 5' 7" (1.702 m)   Wt 83 kg (183 lb)   BMI 28.66 kg/m²    General: Well-developed well-nourished 53 y.o. malein no acute distress   Cardiovascular: Regular rhythm by palpation of distal pulse, normal color and temperature, no concerning varicosities on symptomatic side   Lungs: No labored breathing or wheezing appreciated   Neuro: Alert and oriented ×3   Psychiatric: well oriented to person, place and time, demonstrates normal mood and affect   Skin: No rashes, lesions or ulcers, normal temperature, turgor, and texture on uninvolved extremity    Ortho/SPM Exam  Examination of the right shoulder demonstrates active FE to 145 with limitation due to significant pain, passive to 170. Passive ER to 40 with pain. Prominent tenderness over the proximal biceps tendon. Positive biceps tension signs with guarding. Negative AC tenderness. 4-/5 resisted supraspinatus strength testing. 4/5 resisted infraspinatus strength testing.  Cuff testing limited " due to pain. Negative belly press.  Normal stability testing. No midline neck tenderness.     IMAGING:  Xrays including AP, Outlet and Axillary Lateral of RIGHT shoulder are ordered / images reviewed by me:   Negative.     MRI of RIGHT shoulder reviewed by me and discussed with patient. Study shows:    Tear of intra-articular biceps tendon with large body in the proximal biceps tendon sheath likely representing tendon fragment.    Cuff is intact. No subscapularis pathology.  Well-maintained musculature.    ASSESSMENT:      ICD-10-CM ICD-9-CM   1. Biceps tendon tear S46.219A 840.8   2. Biceps tendinitis of right upper extremity M75.21 726.12       PLAN:     -Findings and treatment options were discussed with the patient to include a biceps sheath steroid injection/physical therapy versus arthroscopic debridement and biceps tenodesis.  The patient describes fairly significant pain with functional limitation and is not interested in a steroid injection.  He wishes to remain fairly active and seeks more definitive management for this.  In that regard he wishes to proceed with surgery. Given the extent of tearing I see on the MRI I believe this is reasonable.  There is some inherent increased risk with surgery given his previous history of PE.  Plan for postoperative ASA + home SCD's x 2 weeks for DVT prophylaxis.  -Plan for a Right shoulder arthroscopic rotator cuff and labral debridement and open subpectoral biceps tenodesis. The details of such were discussed to include the expected postop rehab and recovery course. The patient understands the inherent risk for miguel angel deformity and stiffness.  -Planned DOS for  6/21/2019.   -Clearance Needed - Cardiology   -RTC for preoperative appointment   -All questions answered    Informed Consent:    The details of the surgical procedure were explained, including the location of probable incisions and a description of possible hardware and/or grafts to be used. Alternatives to  both operative and non-operative options with associated risks and benefits were discussed. The patient understands the likely convalescence after surgery and, in particular, the expected postop rehab and recovery course. The outlined risks and potential complications of the proposed procedure include but are not limited to: infection, poor wound healing, scarring, deformity, stiffness, swelling, continued or recurrent pain, instability, hardware or prosthetic failure if implanted, symptomatic hardware requiring removal, weakness, neurovascular injury, numbness, chronic regional pain disorder, tissue nonhealing/irreparability/retear, subsequent contralateral limb injury or pathology, chondral injury, arthritis, fracture, blood clot formation, inability to return to previous level of activity, anesthetic or regional block complication up to death, need for additional procedure as indicated intraoperatively, and potential need for further surgery.    The patient was also informed and understands that the risks of surgery are greater for patients with a current condition or history of heart disease, obesity, clotting disorders, recurrent infections, steroid use, current or past smoking, and factors such as sedentary lifestyle and noncompliance with medications, therapy or follow-up. The degree of the increased risk is hard to estimate with any degree of precision. If applicable, smoking cessation was discussed.     All questions were answered. The patient has verbalized understanding of these issues and wishes to proceed with the surgery as discussed.          Procedures

## 2019-05-31 ENCOUNTER — OFFICE VISIT (OUTPATIENT)
Dept: SPORTS MEDICINE | Facility: CLINIC | Age: 54
End: 2019-05-31
Payer: MEDICARE

## 2019-05-31 VITALS
HEART RATE: 98 BPM | SYSTOLIC BLOOD PRESSURE: 104 MMHG | WEIGHT: 183 LBS | BODY MASS INDEX: 28.72 KG/M2 | HEIGHT: 67 IN | DIASTOLIC BLOOD PRESSURE: 73 MMHG

## 2019-05-31 DIAGNOSIS — S46.219A BICEPS TENDON TEAR: Primary | ICD-10-CM

## 2019-05-31 DIAGNOSIS — M75.101 NON-TRAUMATIC ROTATOR CUFF TEAR, RIGHT: Primary | ICD-10-CM

## 2019-05-31 DIAGNOSIS — M75.21 BICEPS TENDINITIS OF RIGHT UPPER EXTREMITY: ICD-10-CM

## 2019-05-31 PROCEDURE — 99213 OFFICE O/P EST LOW 20 MIN: CPT | Mod: PBBFAC,PO | Performed by: ORTHOPAEDIC SURGERY

## 2019-05-31 PROCEDURE — 99204 OFFICE O/P NEW MOD 45 MIN: CPT | Mod: S$PBB,,, | Performed by: ORTHOPAEDIC SURGERY

## 2019-05-31 PROCEDURE — 99204 PR OFFICE/OUTPT VISIT, NEW, LEVL IV, 45-59 MIN: ICD-10-PCS | Mod: S$PBB,,, | Performed by: ORTHOPAEDIC SURGERY

## 2019-05-31 PROCEDURE — 99999 PR PBB SHADOW E&M-EST. PATIENT-LVL III: CPT | Mod: PBBFAC,,, | Performed by: ORTHOPAEDIC SURGERY

## 2019-05-31 PROCEDURE — 99999 PR PBB SHADOW E&M-EST. PATIENT-LVL III: ICD-10-PCS | Mod: PBBFAC,,, | Performed by: ORTHOPAEDIC SURGERY

## 2019-05-31 NOTE — LETTER
June 1, 2019      Jesse Mcintosh PA-C  [address]  [facility] [address]           [facility]  [address]  [facility] [address]  Phone: [phone]          Patient: Van Jones   MR Number: [id number]   YOB: 1965   Date of Visit: 5/31/2019       Dear Jesse Mcintosh:    Thank you for referring Van Jones to me for evaluation. Attached you will find relevant portions of my assessment and plan of care.    If you have questions, please do not hesitate to call me. I look forward to following Van Jones along with you.    Sincerely,    TALON Brink MD    Enclosure  CC:  No Recipients    If you would like to receive this communication electronically, please contact [email or web address] or [phone] to request more information on [employer] Link access.    For providers and/or their staff who would like to refer a patient to [facility], please contact us through our one-stop-shop provider referral line, [facility], at [phone].    If you feel you have received this communication in error or would no longer like to receive these types of communications, please e-mail [email or web address]

## 2019-06-05 ENCOUNTER — PATIENT MESSAGE (OUTPATIENT)
Dept: SPORTS MEDICINE | Facility: CLINIC | Age: 54
End: 2019-06-05

## 2019-06-14 ENCOUNTER — OFFICE VISIT (OUTPATIENT)
Dept: SPORTS MEDICINE | Facility: CLINIC | Age: 54
End: 2019-06-14
Payer: MEDICARE

## 2019-06-14 VITALS
BODY MASS INDEX: 28.56 KG/M2 | HEART RATE: 96 BPM | DIASTOLIC BLOOD PRESSURE: 73 MMHG | HEIGHT: 67 IN | WEIGHT: 182 LBS | SYSTOLIC BLOOD PRESSURE: 117 MMHG

## 2019-06-14 DIAGNOSIS — S46.219A BICEPS TENDON TEAR: Primary | ICD-10-CM

## 2019-06-14 PROCEDURE — 99213 OFFICE O/P EST LOW 20 MIN: CPT | Mod: PBBFAC,PO | Performed by: PHYSICIAN ASSISTANT

## 2019-06-14 PROCEDURE — 99499 UNLISTED E&M SERVICE: CPT | Mod: S$PBB,,, | Performed by: PHYSICIAN ASSISTANT

## 2019-06-14 PROCEDURE — 99999 PR PBB SHADOW E&M-EST. PATIENT-LVL III: CPT | Mod: PBBFAC,,, | Performed by: PHYSICIAN ASSISTANT

## 2019-06-14 PROCEDURE — 99999 PR PBB SHADOW E&M-EST. PATIENT-LVL III: ICD-10-PCS | Mod: PBBFAC,,, | Performed by: PHYSICIAN ASSISTANT

## 2019-06-14 PROCEDURE — 99499 NO LOS: ICD-10-PCS | Mod: S$PBB,,, | Performed by: PHYSICIAN ASSISTANT

## 2019-06-14 RX ORDER — ASPIRIN 81 MG/1
81 TABLET ORAL 2 TIMES DAILY
Qty: 28 TABLET | Refills: 0 | Status: SHIPPED | OUTPATIENT
Start: 2019-06-14 | End: 2019-06-28

## 2019-06-14 RX ORDER — OXYCODONE HYDROCHLORIDE 5 MG/1
5-10 TABLET ORAL
Qty: 28 TABLET | Refills: 0 | Status: SHIPPED | OUTPATIENT
Start: 2019-06-14 | End: 2019-06-26 | Stop reason: SDUPTHER

## 2019-06-14 RX ORDER — ONDANSETRON 4 MG/1
4 TABLET, FILM COATED ORAL EVERY 8 HOURS PRN
Qty: 30 TABLET | Refills: 0 | Status: SHIPPED | OUTPATIENT
Start: 2019-06-14 | End: 2019-06-21

## 2019-06-14 NOTE — H&P
Van Jones  is here for a completion of his perioperative paperwork. he  Is scheduled to undergo  Right shoulder arthroscopic rotator cuff and labral debridement and open subpectoral biceps tenodesis  on 6/21/2019.  He is a healthy individual and does need clearance for this procedure.     Dr. Chu, Cards, stated that there are no contraindications for right shoulder surgery. (in media tab)    Dr. Fermin, PCP,  stated that he is medically clear for surgery. ( in media tab)    Risks, indications and benefits of the surgical procedure were discussed with the patient. All questions with regard to surgery, rehab, expected return to functional activities, activities of daily living and recreational endeavors were answered to his satisfaction.    Patient was informed and understands the risks of surgery are greater for patients with a current condition or hx of heart disease, obesity, clotting disorders, recurrent infections, steroid use, current or past smoking, and factors such as sedentary lifestyle and noncompliance with medications, therapy or f/u. The degree of the increased risk is hard to estimate w/ any degree of precision.    Once no other questions were asked, a brief history and physical exam was then performed.    PAST MEDICAL HISTORY:   Past Medical History:   Diagnosis Date    Broken femur     Deep vein thrombosis     Hyperlipidemia     Nerve damage left    Pulmonary embolism     Reflex sympathetic dystrophy 1983    Left leg after broken femur surgery     PAST SURGICAL HISTORY:   Past Surgical History:   Procedure Laterality Date    CERVICAL DISC ARTHROPLASTY  01/23/2018    C3/4    CERVICAL TOTAL DISC REPLACEMENT, C3/4  N/A 1/23/2018    Performed by [facility]em Noguera MD at [facility] OR 2ND FLR    COLONOSCOPY N/A 2/25/2016    Performed by Daryl Viramontes MD at [facility] ENDO (4TH FLR)    ENDOSCOPY Left 5/11/2018    Performed by Elver Amador MD at [facility] OR 2ND FLR    FEMUR SURGERY      HERNIA  REPAIR      umbilical    TONSILLECTOMY       FAMILY HISTORY:   Family History   Problem Relation Age of Onset    Heart disease Mother 60    Neuropathy Mother     Heart disease Father 45     SOCIAL HISTORY:   Social History     Socioeconomic History    Marital status:      Spouse name: Not on file    Number of children: Not on file    Years of education: Not on file    Highest education level: Not on file   Occupational History    Not on file   Social Needs    Financial resource strain: Not on file    Food insecurity:     Worry: Not on file     Inability: Not on file    Transportation needs:     Medical: Not on file     Non-medical: Not on file   Tobacco Use    Smoking status: Never Smoker    Smokeless tobacco: Never Used   Substance and Sexual Activity    Alcohol use: Yes     Comment: occasional    Drug use: No    Sexual activity: Not on file   Lifestyle    Physical activity:     Days per week: Not on file     Minutes per session: Not on file    Stress: Not on file   Relationships    Social connections:     Talks on phone: Not on file     Gets together: Not on file     Attends Druze service: Not on file     Active member of club or organization: Not on file     Attends meetings of clubs or organizations: Not on file     Relationship status: Not on file   Other Topics Concern    Not on file   Social History Narrative    Not on file       MEDICATIONS:   Current Outpatient Medications:     aspirin (ECOTRIN) 81 MG EC tablet, Take 81 mg by mouth once daily., Disp: , Rfl:     azelastine (ASTELIN) 137 mcg (0.1 %) nasal spray, U 1 SPR IEN BID, Disp: , Rfl: 0    buPROPion (WELLBUTRIN XL) 300 MG 24 hr tablet, Take 300 mg by mouth once daily. , Disp: , Rfl:     LORazepam (ATIVAN) 0.5 MG tablet, , Disp: , Rfl:     meloxicam (MOBIC) 15 MG tablet, Take 1 tablet (15 mg total) by mouth once daily., Disp: 30 tablet, Rfl: 1    rosuvastatin (CRESTOR) 10 MG tablet, Take 10 mg by mouth every  evening. , Disp: , Rfl:     sertraline (ZOLOFT) 50 MG tablet, , Disp: , Rfl:   ALLERGIES:   Review of patient's allergies indicates:   Allergen Reactions    Lamisil [terbinafine hcl] Rash       Review of Systems   Constitution: Negative. Negative for chills, fever and night sweats.   HENT: Negative for congestion and headaches.    Eyes: Negative for blurred vision, left vision loss and right vision loss.   Cardiovascular: Negative for chest pain and syncope.   Respiratory: Negative for cough and shortness of breath.    Endocrine: Negative for polydipsia, polyphagia and polyuria.   Hematologic/Lymphatic: Negative for bleeding problem. Does not bruise/bleed easily.   Skin: Negative for dry skin, itching and rash.   Musculoskeletal: Negative for falls and muscle weakness.   Gastrointestinal: Negative for abdominal pain and bowel incontinence.   Genitourinary: Negative for bladder incontinence and nocturia.   Neurological: Negative for disturbances in coordination, loss of balance and seizures.   Psychiatric/Behavioral: Negative for depression. The patient does not have insomnia.    Allergic/Immunologic: Negative for hives and persistent infections.     PHYSICAL EXAM:  GEN: A&Ox3, WD WN NAD  HEENT: WNL  CHEST: CTAB, no W/R/R  HEART: RRR, no M/R/G   ABD: Soft, NT ND, BS x4 QUADS  MS: Refer to previous note for detailed MS exam  NEURO: CN II-XII intact       The surgical consent was then reviewed with the patient, who agreed with all the contents of the consent form and it was signed.     PHYSICAL THERAPY:  He was also instructed regarding physical therapy and will begin on POD#1-3 at [facility] in [place].     POST OP CARE: Instructions were reviewed including care of the wound and dressing after surgery and when he can shower.     PAIN MANAGEMENT: Van Jones was instructed regarding the Polar ice unit that will be in place after surgery and his postoperative pain medications.     MEDICATION:  Roxicodone 5 mg 1-2 q  4 hours PRN for pain  Zofran 4 mg q 8 hours PRN for nausea and vomiting.  Aspirin 81mg BID x 2 weeks for DVT prophylaxis starting on the evening after surgery.    Patient was instructed to purchase and take Colace to counter possible GI side effects of taking opiates.     DVT prophylaxis was discussed with the patient today including risk factors for developing DVTs and history of DVTs. The patient was asked if any specific recommendations were given from the doctor/s that did pre-operative surgical clearance.      If the patient was previously taking 81mg baby aspirin, they were told to not take additional baby aspirin, using the above stated aspirin and to restart the 81mg aspirin daily after completion of the aspirin dose.      Patient was also told to buy over the counter Prilosec medication and take it once daily for GI protection as long as they are taking NSAIDs or Aspirin.     The patient was told that narcotic pain medications may make them drowsy and instructions were given to not sign legal documents, drive or operate heavy machinery, cars, or equipment while under the influence of narcotic medications.     Dr. Brink was not present in clinic today. However, patient had no further questions for myself or Dr. Brink.    As there were no other questions to be asked, he was given my business card along with Dr. Brink's business card if he has any questions or concerns prior to surgery or in the postop period.

## 2019-06-14 NOTE — H&P (VIEW-ONLY)
Van Jones  is here for a completion of his perioperative paperwork. he  Is scheduled to undergo  Right shoulder arthroscopic rotator cuff and labral debridement and open subpectoral biceps tenodesis  on 6/21/2019.  He is a healthy individual and does need clearance for this procedure.     Dr. Chu, Cards, stated that there are no contraindications for right shoulder surgery. (in media tab)    Dr. Fermin, PCP,  stated that he is medically clear for surgery. ( in media tab)    Risks, indications and benefits of the surgical procedure were discussed with the patient. All questions with regard to surgery, rehab, expected return to functional activities, activities of daily living and recreational endeavors were answered to his satisfaction.    Patient was informed and understands the risks of surgery are greater for patients with a current condition or hx of heart disease, obesity, clotting disorders, recurrent infections, steroid use, current or past smoking, and factors such as sedentary lifestyle and noncompliance with medications, therapy or f/u. The degree of the increased risk is hard to estimate w/ any degree of precision.    Once no other questions were asked, a brief history and physical exam was then performed.    PAST MEDICAL HISTORY:   Past Medical History:   Diagnosis Date    Broken femur     Deep vein thrombosis     Hyperlipidemia     Nerve damage left    Pulmonary embolism     Reflex sympathetic dystrophy 1983    Left leg after broken femur surgery     PAST SURGICAL HISTORY:   Past Surgical History:   Procedure Laterality Date    CERVICAL DISC ARTHROPLASTY  01/23/2018    C3/4    CERVICAL TOTAL DISC REPLACEMENT, C3/4  N/A 1/23/2018    Performed by [facility]em [facility]og[facility]era MD at [facility] OR 2ND FLR    COLONOSCOPY N/A 2/25/2016    Performed by Daryl Viramontes MD at [facility] ENDO (4TH FLR)    ENDOSCOPY Left 5/11/2018    Performed by Elver Amador MD at [facility] OR 2ND FLR    FEMUR SURGERY      HERNIA  REPAIR      umbilical    TONSILLECTOMY       FAMILY HISTORY:   Family History   Problem Relation Age of Onset    Heart disease Mother 60    Neuropathy Mother     Heart disease Father 45     SOCIAL HISTORY:   Social History     Socioeconomic History    Marital status:      Spouse name: Not on file    Number of children: Not on file    Years of education: Not on file    Highest education level: Not on file   Occupational History    Not on file   Social Needs    Financial resource strain: Not on file    Food insecurity:     Worry: Not on file     Inability: Not on file    Transportation needs:     Medical: Not on file     Non-medical: Not on file   Tobacco Use    Smoking status: Never Smoker    Smokeless tobacco: Never Used   Substance and Sexual Activity    Alcohol use: Yes     Comment: occasional    Drug use: No    Sexual activity: Not on file   Lifestyle    Physical activity:     Days per week: Not on file     Minutes per session: Not on file    Stress: Not on file   Relationships    Social connections:     Talks on phone: Not on file     Gets together: Not on file     Attends Yarsanism service: Not on file     Active member of club or organization: Not on file     Attends meetings of clubs or organizations: Not on file     Relationship status: Not on file   Other Topics Concern    Not on file   Social History Narrative    Not on file       MEDICATIONS:   Current Outpatient Medications:     aspirin (ECOTRIN) 81 MG EC tablet, Take 81 mg by mouth once daily., Disp: , Rfl:     azelastine (ASTELIN) 137 mcg (0.1 %) nasal spray, U 1 SPR IEN BID, Disp: , Rfl: 0    buPROPion (WELLBUTRIN XL) 300 MG 24 hr tablet, Take 300 mg by mouth once daily. , Disp: , Rfl:     LORazepam (ATIVAN) 0.5 MG tablet, , Disp: , Rfl:     meloxicam (MOBIC) 15 MG tablet, Take 1 tablet (15 mg total) by mouth once daily., Disp: 30 tablet, Rfl: 1    rosuvastatin (CRESTOR) 10 MG tablet, Take 10 mg by mouth every  evening. , Disp: , Rfl:     sertraline (ZOLOFT) 50 MG tablet, , Disp: , Rfl:   ALLERGIES:   Review of patient's allergies indicates:   Allergen Reactions    Lamisil [terbinafine hcl] Rash       Review of Systems   Constitution: Negative. Negative for chills, fever and night sweats.   HENT: Negative for congestion and headaches.    Eyes: Negative for blurred vision, left vision loss and right vision loss.   Cardiovascular: Negative for chest pain and syncope.   Respiratory: Negative for cough and shortness of breath.    Endocrine: Negative for polydipsia, polyphagia and polyuria.   Hematologic/Lymphatic: Negative for bleeding problem. Does not bruise/bleed easily.   Skin: Negative for dry skin, itching and rash.   Musculoskeletal: Negative for falls and muscle weakness.   Gastrointestinal: Negative for abdominal pain and bowel incontinence.   Genitourinary: Negative for bladder incontinence and nocturia.   Neurological: Negative for disturbances in coordination, loss of balance and seizures.   Psychiatric/Behavioral: Negative for depression. The patient does not have insomnia.    Allergic/Immunologic: Negative for hives and persistent infections.     PHYSICAL EXAM:  GEN: A&Ox3, WD WN NAD  HEENT: WNL  CHEST: CTAB, no W/R/R  HEART: RRR, no M/R/G   ABD: Soft, NT ND, BS x4 QUADS  MS: Refer to previous note for detailed MS exam  NEURO: CN II-XII intact       The surgical consent was then reviewed with the patient, who agreed with all the contents of the consent form and it was signed.     PHYSICAL THERAPY:  He was also instructed regarding physical therapy and will begin on POD#1-3 at [facility] in [place].     POST OP CARE: Instructions were reviewed including care of the wound and dressing after surgery and when he can shower.     PAIN MANAGEMENT: Van Jones was instructed regarding the Polar ice unit that will be in place after surgery and his postoperative pain medications.     MEDICATION:  Roxicodone 5 mg 1-2 q  4 hours PRN for pain  Zofran 4 mg q 8 hours PRN for nausea and vomiting.  Aspirin 81mg BID x 2 weeks for DVT prophylaxis starting on the evening after surgery.    Patient was instructed to purchase and take Colace to counter possible GI side effects of taking opiates.     DVT prophylaxis was discussed with the patient today including risk factors for developing DVTs and history of DVTs. The patient was asked if any specific recommendations were given from the doctor/s that did pre-operative surgical clearance.      If the patient was previously taking 81mg baby aspirin, they were told to not take additional baby aspirin, using the above stated aspirin and to restart the 81mg aspirin daily after completion of the aspirin dose.      Patient was also told to buy over the counter Prilosec medication and take it once daily for GI protection as long as they are taking NSAIDs or Aspirin.     The patient was told that narcotic pain medications may make them drowsy and instructions were given to not sign legal documents, drive or operate heavy machinery, cars, or equipment while under the influence of narcotic medications.     Dr. Brink was not present in clinic today. However, patient had no further questions for myself or Dr. Brink.    As there were no other questions to be asked, he was given my business card along with Dr. Brink's business card if he has any questions or concerns prior to surgery or in the postop period.

## 2019-06-20 ENCOUNTER — ANESTHESIA EVENT (OUTPATIENT)
Dept: SURGERY | Facility: HOSPITAL | Age: 54
End: 2019-06-20
Payer: MEDICARE

## 2019-06-20 ENCOUNTER — TELEPHONE (OUTPATIENT)
Dept: SPORTS MEDICINE | Facility: CLINIC | Age: 54
End: 2019-06-20

## 2019-06-20 NOTE — PRE-PROCEDURE INSTRUCTIONS
>>NPO instructions given per surgeons office.     -- Medication information (what to hold and what to take)   -- Arrival place and directions given; time to be given the day before procedure by the Surgeon's Office   -- Bathing with antibacterial soap   -- Don't wear any jewelry or bring any valuables AM of surgery   -- No powder, lotions, creams     Pt verbalized understanding.

## 2019-06-21 ENCOUNTER — ANESTHESIA (OUTPATIENT)
Dept: SURGERY | Facility: HOSPITAL | Age: 54
End: 2019-06-21
Payer: MEDICARE

## 2019-06-21 ENCOUNTER — HOSPITAL ENCOUNTER (OUTPATIENT)
Facility: HOSPITAL | Age: 54
Discharge: HOME | End: 2019-06-22
Attending: ORTHOPAEDIC SURGERY | Admitting: ORTHOPAEDIC SURGERY
Payer: MEDICARE

## 2019-06-21 DIAGNOSIS — S46.219A BICEPS TENDON TEAR: Primary | ICD-10-CM

## 2019-06-21 PROCEDURE — 76942 ECHO GUIDE FOR BIOPSY: CPT | Performed by: STUDENT IN AN ORGANIZED HEALTH CARE EDUCATION/TRAINING PROGRAM

## 2019-06-21 PROCEDURE — 37000008 HC ANESTHESIA 1ST 15 MINUTES: Performed by: ORTHOPAEDIC SURGERY

## 2019-06-21 PROCEDURE — 36000711: Performed by: ORTHOPAEDIC SURGERY

## 2019-06-21 PROCEDURE — 29822 PR SHLDR ARTHROSCOP,PART DEBRIDE: ICD-10-PCS | Mod: 51,RT,, | Performed by: ORTHOPAEDIC SURGERY

## 2019-06-21 PROCEDURE — 76942 INTERSCALENE BRACHIAL PLEXUS CATHETER: ICD-10-PCS | Mod: 26,GC,, | Performed by: ANESTHESIOLOGY

## 2019-06-21 PROCEDURE — 25000003 PHARM REV CODE 250: Performed by: NURSE ANESTHETIST, CERTIFIED REGISTERED

## 2019-06-21 PROCEDURE — 29826 PR SHLDR ARTHROSCOP,PART ACROMIOPLAS: ICD-10-PCS | Mod: RT,,, | Performed by: ORTHOPAEDIC SURGERY

## 2019-06-21 PROCEDURE — 64416 INTERSCALENE BRACHIAL PLEXUS CATHETER: ICD-10-PCS | Mod: 59,RT,GC, | Performed by: ANESTHESIOLOGY

## 2019-06-21 PROCEDURE — 25000003 PHARM REV CODE 250: Performed by: ORTHOPAEDIC SURGERY

## 2019-06-21 PROCEDURE — 63600175 PHARM REV CODE 636 W HCPCS: Performed by: STUDENT IN AN ORGANIZED HEALTH CARE EDUCATION/TRAINING PROGRAM

## 2019-06-21 PROCEDURE — 29826 SHO ARTHRS SRG DECOMPRESSION: CPT | Mod: RT,,, | Performed by: ORTHOPAEDIC SURGERY

## 2019-06-21 PROCEDURE — 64416 NJX AA&/STRD BRCH PL NFS IMG: CPT | Performed by: STUDENT IN AN ORGANIZED HEALTH CARE EDUCATION/TRAINING PROGRAM

## 2019-06-21 PROCEDURE — 36000710: Performed by: ORTHOPAEDIC SURGERY

## 2019-06-21 PROCEDURE — 29822 SHO ARTHRS SRG LMTD DBRDMT: CPT | Mod: 51,RT,, | Performed by: ORTHOPAEDIC SURGERY

## 2019-06-21 PROCEDURE — 71000044 HC DOSC ROUTINE RECOVERY FIRST HOUR: Performed by: ORTHOPAEDIC SURGERY

## 2019-06-21 PROCEDURE — 23430 PR REPAIR BICEPS LONG TENDON: ICD-10-PCS | Mod: RT,,, | Performed by: ORTHOPAEDIC SURGERY

## 2019-06-21 PROCEDURE — 27201423 OPTIME MED/SURG SUP & DEVICES STERILE SUPPLY: Performed by: ORTHOPAEDIC SURGERY

## 2019-06-21 PROCEDURE — 37000009 HC ANESTHESIA EA ADD 15 MINS: Performed by: ORTHOPAEDIC SURGERY

## 2019-06-21 PROCEDURE — D9220A PRA ANESTHESIA: ICD-10-PCS | Mod: CRNA,,, | Performed by: NURSE ANESTHETIST, CERTIFIED REGISTERED

## 2019-06-21 PROCEDURE — D9220A PRA ANESTHESIA: Mod: CRNA,,, | Performed by: NURSE ANESTHETIST, CERTIFIED REGISTERED

## 2019-06-21 PROCEDURE — 23430 REPAIR BICEPS TENDON: CPT | Mod: RT,,, | Performed by: ORTHOPAEDIC SURGERY

## 2019-06-21 PROCEDURE — C1713 ANCHOR/SCREW BN/BN,TIS/BN: HCPCS | Performed by: ORTHOPAEDIC SURGERY

## 2019-06-21 PROCEDURE — 25000003 PHARM REV CODE 250: Performed by: STUDENT IN AN ORGANIZED HEALTH CARE EDUCATION/TRAINING PROGRAM

## 2019-06-21 PROCEDURE — 64416 NJX AA&/STRD BRCH PL NFS IMG: CPT | Performed by: ANESTHESIOLOGY

## 2019-06-21 PROCEDURE — 71000045 HC DOSC ROUTINE RECOVERY EA ADD'L HR: Performed by: ORTHOPAEDIC SURGERY

## 2019-06-21 PROCEDURE — D9220A PRA ANESTHESIA: ICD-10-PCS | Mod: ANES,,, | Performed by: ANESTHESIOLOGY

## 2019-06-21 PROCEDURE — 63600175 PHARM REV CODE 636 W HCPCS: Performed by: NURSE ANESTHETIST, CERTIFIED REGISTERED

## 2019-06-21 PROCEDURE — 63600175 PHARM REV CODE 636 W HCPCS: Performed by: ORTHOPAEDIC SURGERY

## 2019-06-21 PROCEDURE — 71000016 HC POSTOP RECOV ADDL HR: Performed by: ORTHOPAEDIC SURGERY

## 2019-06-21 PROCEDURE — 63600175 PHARM REV CODE 636 W HCPCS: Performed by: ANESTHESIOLOGY

## 2019-06-21 PROCEDURE — 76942 ECHO GUIDE FOR BIOPSY: CPT | Mod: 26,GC,, | Performed by: ANESTHESIOLOGY

## 2019-06-21 PROCEDURE — D9220A PRA ANESTHESIA: Mod: ANES,,, | Performed by: ANESTHESIOLOGY

## 2019-06-21 PROCEDURE — 71000015 HC POSTOP RECOV 1ST HR: Performed by: ORTHOPAEDIC SURGERY

## 2019-06-21 DEVICE — ANCHOR FORKED TIP 7MM PEEI: Type: IMPLANTABLE DEVICE | Site: SHOULDER | Status: FUNCTIONAL

## 2019-06-21 RX ORDER — ROSUVASTATIN CALCIUM 10 MG/1
10 TABLET, COATED ORAL NIGHTLY
Status: DISCONTINUED | OUTPATIENT
Start: 2019-06-21 | End: 2019-06-22 | Stop reason: HOSPADM

## 2019-06-21 RX ORDER — OXYCODONE HYDROCHLORIDE 10 MG/1
10 TABLET ORAL EVERY 4 HOURS PRN
Status: DISCONTINUED | OUTPATIENT
Start: 2019-06-21 | End: 2019-06-22 | Stop reason: HOSPADM

## 2019-06-21 RX ORDER — EPINEPHRINE 1 MG/ML
INJECTION, SOLUTION INTRACARDIAC; INTRAMUSCULAR; INTRAVENOUS; SUBCUTANEOUS
Status: DISCONTINUED | OUTPATIENT
Start: 2019-06-21 | End: 2019-06-21 | Stop reason: HOSPADM

## 2019-06-21 RX ORDER — SODIUM CHLORIDE 0.9 % (FLUSH) 0.9 %
10 SYRINGE (ML) INJECTION
Status: DISCONTINUED | OUTPATIENT
Start: 2019-06-21 | End: 2019-06-22 | Stop reason: HOSPADM

## 2019-06-21 RX ORDER — KETAMINE HCL IN 0.9 % NACL 50 MG/5 ML
SYRINGE (ML) INTRAVENOUS
Status: DISCONTINUED | OUTPATIENT
Start: 2019-06-21 | End: 2019-06-21

## 2019-06-21 RX ORDER — EPINEPHRINE 1 MG/ML
INJECTION INTRAMUSCULAR; INTRAVENOUS; SUBCUTANEOUS
Status: DISPENSED
Start: 2019-06-21 | End: 2019-06-21

## 2019-06-21 RX ORDER — ONDANSETRON 8 MG/1
8 TABLET, ORALLY DISINTEGRATING ORAL EVERY 8 HOURS PRN
Status: DISCONTINUED | OUTPATIENT
Start: 2019-06-21 | End: 2019-06-22 | Stop reason: HOSPADM

## 2019-06-21 RX ORDER — ONDANSETRON 2 MG/ML
INJECTION INTRAMUSCULAR; INTRAVENOUS
Status: DISCONTINUED | OUTPATIENT
Start: 2019-06-21 | End: 2019-06-21

## 2019-06-21 RX ORDER — CEFAZOLIN SODIUM 1 G/3ML
2 INJECTION, POWDER, FOR SOLUTION INTRAMUSCULAR; INTRAVENOUS
Status: DISCONTINUED | OUTPATIENT
Start: 2019-06-21 | End: 2019-06-21 | Stop reason: HOSPADM

## 2019-06-21 RX ORDER — SERTRALINE HYDROCHLORIDE 50 MG/1
50 TABLET, FILM COATED ORAL NIGHTLY
Status: DISCONTINUED | OUTPATIENT
Start: 2019-06-21 | End: 2019-06-22 | Stop reason: HOSPADM

## 2019-06-21 RX ORDER — PROPOFOL 10 MG/ML
VIAL (ML) INTRAVENOUS
Status: DISCONTINUED | OUTPATIENT
Start: 2019-06-21 | End: 2019-06-21

## 2019-06-21 RX ORDER — HYDROCODONE BITARTRATE AND ACETAMINOPHEN 5; 325 MG/1; MG/1
1 TABLET ORAL EVERY 4 HOURS PRN
Status: DISCONTINUED | OUTPATIENT
Start: 2019-06-21 | End: 2019-06-22 | Stop reason: HOSPADM

## 2019-06-21 RX ORDER — DEXAMETHASONE SODIUM PHOSPHATE 4 MG/ML
INJECTION, SOLUTION INTRA-ARTICULAR; INTRALESIONAL; INTRAMUSCULAR; INTRAVENOUS; SOFT TISSUE
Status: DISCONTINUED | OUTPATIENT
Start: 2019-06-21 | End: 2019-06-21

## 2019-06-21 RX ORDER — HYDROMORPHONE HYDROCHLORIDE 1 MG/ML
0.2 INJECTION, SOLUTION INTRAMUSCULAR; INTRAVENOUS; SUBCUTANEOUS EVERY 5 MIN PRN
Status: COMPLETED | OUTPATIENT
Start: 2019-06-21 | End: 2019-06-21

## 2019-06-21 RX ORDER — ASPIRIN 81 MG/1
81 TABLET ORAL 2 TIMES DAILY
Status: DISCONTINUED | OUTPATIENT
Start: 2019-06-21 | End: 2019-06-22 | Stop reason: HOSPADM

## 2019-06-21 RX ORDER — LIDOCAINE HYDROCHLORIDE 10 MG/ML
INJECTION, SOLUTION INTRAVENOUS
Status: DISCONTINUED | OUTPATIENT
Start: 2019-06-21 | End: 2019-06-21

## 2019-06-21 RX ORDER — MIDAZOLAM HYDROCHLORIDE 1 MG/ML
0.5 INJECTION INTRAMUSCULAR; INTRAVENOUS
Status: DISCONTINUED | OUTPATIENT
Start: 2019-06-21 | End: 2019-06-21 | Stop reason: HOSPADM

## 2019-06-21 RX ORDER — METOCLOPRAMIDE HYDROCHLORIDE 5 MG/ML
5 INJECTION INTRAMUSCULAR; INTRAVENOUS EVERY 6 HOURS PRN
Status: DISCONTINUED | OUTPATIENT
Start: 2019-06-21 | End: 2019-06-22 | Stop reason: HOSPADM

## 2019-06-21 RX ORDER — ACETAMINOPHEN 10 MG/ML
INJECTION, SOLUTION INTRAVENOUS
Status: DISCONTINUED | OUTPATIENT
Start: 2019-06-21 | End: 2019-06-21

## 2019-06-21 RX ORDER — ROCURONIUM BROMIDE 10 MG/ML
INJECTION, SOLUTION INTRAVENOUS
Status: DISCONTINUED | OUTPATIENT
Start: 2019-06-21 | End: 2019-06-21

## 2019-06-21 RX ORDER — TRAMADOL HYDROCHLORIDE 50 MG/1
50 TABLET ORAL EVERY 4 HOURS PRN
Status: DISCONTINUED | OUTPATIENT
Start: 2019-06-21 | End: 2019-06-22 | Stop reason: HOSPADM

## 2019-06-21 RX ORDER — LORAZEPAM 0.5 MG/1
0.5 TABLET ORAL NIGHTLY
Status: DISCONTINUED | OUTPATIENT
Start: 2019-06-21 | End: 2019-06-22 | Stop reason: HOSPADM

## 2019-06-21 RX ORDER — HYDROMORPHONE HYDROCHLORIDE 1 MG/ML
INJECTION, SOLUTION INTRAMUSCULAR; INTRAVENOUS; SUBCUTANEOUS
Status: DISPENSED
Start: 2019-06-21 | End: 2019-06-21

## 2019-06-21 RX ORDER — FENTANYL CITRATE 50 UG/ML
25 INJECTION, SOLUTION INTRAMUSCULAR; INTRAVENOUS EVERY 5 MIN PRN
Status: DISCONTINUED | OUTPATIENT
Start: 2019-06-21 | End: 2019-06-21 | Stop reason: HOSPADM

## 2019-06-21 RX ORDER — BUPIVACAINE HYDROCHLORIDE AND EPINEPHRINE 2.5; 5 MG/ML; UG/ML
INJECTION, SOLUTION EPIDURAL; INFILTRATION; INTRACAUDAL; PERINEURAL
Status: COMPLETED | OUTPATIENT
Start: 2019-06-21 | End: 2019-06-21

## 2019-06-21 RX ORDER — FENTANYL CITRATE 50 UG/ML
INJECTION, SOLUTION INTRAMUSCULAR; INTRAVENOUS
Status: DISCONTINUED | OUTPATIENT
Start: 2019-06-21 | End: 2019-06-21

## 2019-06-21 RX ORDER — SODIUM CHLORIDE 9 MG/ML
INJECTION, SOLUTION INTRAVENOUS CONTINUOUS PRN
Status: DISCONTINUED | OUTPATIENT
Start: 2019-06-21 | End: 2019-06-21

## 2019-06-21 RX ADMIN — ROCURONIUM BROMIDE 10 MG: 10 INJECTION, SOLUTION INTRAVENOUS at 07:06

## 2019-06-21 RX ADMIN — HYDROMORPHONE HYDROCHLORIDE 0.2 MG: 1 INJECTION, SOLUTION INTRAMUSCULAR; INTRAVENOUS; SUBCUTANEOUS at 10:06

## 2019-06-21 RX ADMIN — DEXAMETHASONE SODIUM PHOSPHATE 8 MG: 4 INJECTION, SOLUTION INTRAMUSCULAR; INTRAVENOUS at 07:06

## 2019-06-21 RX ADMIN — ONDANSETRON 4 MG: 2 INJECTION INTRAMUSCULAR; INTRAVENOUS at 07:06

## 2019-06-21 RX ADMIN — OXYCODONE HYDROCHLORIDE 10 MG: 10 TABLET ORAL at 11:06

## 2019-06-21 RX ADMIN — BUPIVACAINE HYDROCHLORIDE AND EPINEPHRINE BITARTRATE 20 ML: 2.5; .0091 INJECTION, SOLUTION EPIDURAL; INFILTRATION; INTRACAUDAL; PERINEURAL at 07:06

## 2019-06-21 RX ADMIN — SUGAMMADEX 200 MG: 100 INJECTION, SOLUTION INTRAVENOUS at 08:06

## 2019-06-21 RX ADMIN — MIDAZOLAM HYDROCHLORIDE 2 MG: 1 INJECTION, SOLUTION INTRAMUSCULAR; INTRAVENOUS at 06:06

## 2019-06-21 RX ADMIN — ACETAMINOPHEN 1000 MG: 10 INJECTION, SOLUTION INTRAVENOUS at 07:06

## 2019-06-21 RX ADMIN — ROSUVASTATIN CALCIUM 10 MG: 10 TABLET, FILM COATED ORAL at 09:06

## 2019-06-21 RX ADMIN — SERTRALINE HYDROCHLORIDE 50 MG: 50 TABLET ORAL at 09:06

## 2019-06-21 RX ADMIN — Medication 30 MG: at 07:06

## 2019-06-21 RX ADMIN — HYDROCODONE BITARTRATE AND ACETAMINOPHEN 1 TABLET: 5; 325 TABLET ORAL at 09:06

## 2019-06-21 RX ADMIN — ROCURONIUM BROMIDE 40 MG: 10 INJECTION, SOLUTION INTRAVENOUS at 07:06

## 2019-06-21 RX ADMIN — SODIUM CHLORIDE: 0.9 INJECTION, SOLUTION INTRAVENOUS at 07:06

## 2019-06-21 RX ADMIN — FENTANYL CITRATE 100 MCG: 50 INJECTION, SOLUTION INTRAMUSCULAR; INTRAVENOUS at 07:06

## 2019-06-21 RX ADMIN — OXYCODONE HYDROCHLORIDE 10 MG: 10 TABLET ORAL at 09:06

## 2019-06-21 RX ADMIN — LORAZEPAM 0.5 MG: 0.5 TABLET ORAL at 09:06

## 2019-06-21 RX ADMIN — ROPIVACAINE HYDROCHLORIDE: 2 INJECTION, SOLUTION EPIDURAL; INFILTRATION at 09:06

## 2019-06-21 RX ADMIN — ASPIRIN 81 MG: 81 TABLET, COATED ORAL at 09:06

## 2019-06-21 RX ADMIN — LIDOCAINE HYDROCHLORIDE 100 MG: 10 INJECTION, SOLUTION INTRAVENOUS at 07:06

## 2019-06-21 RX ADMIN — PROPOFOL 200 MG: 10 INJECTION, EMULSION INTRAVENOUS at 07:06

## 2019-06-21 NOTE — PROGRESS NOTES
Tried to ween pt off O2, on RA pt was 88%. Pt now at 94% back on 5L O2. Informed DR. Marinelli she will discuss w/ DR. Brink about keeping pt over night.

## 2019-06-21 NOTE — TRANSFER OF CARE
"Anesthesia Transfer of Care Note    Patient: Van Jones    Procedure(s) Performed: Procedure(s) (LRB):  ARTHROSCOPY, SHOULDER, BICEPS TENODESIS (Right)  ARTHROSCOPY, SHOULDER, WITH SUBACROMIAL SPACE DECOMPRESSION (Right)  SYNOVECTOMY, SHOULDER (Right)    Patient location: [facility]    Anesthesia Type: general    Transport from OR: Transported from OR on 6-10 L/min O2 by face mask with adequate spontaneous ventilation    Post pain: adequate analgesia    Post assessment: no apparent anesthetic complications and tolerated procedure well    Post vital signs: stable    Level of consciousness: lethargic and responds to stimulation    Nausea/Vomiting: no nausea/vomiting    Complications: none    Transfer of care protocol was followed      Last vitals:   Visit Vitals  /82   Pulse 109   Temp 36.6 °C (97.9 °F) (Oral)   Resp 16   Ht 5' 7" (1.702 m)   Wt 81.2 kg (179 lb)   SpO2 96%   BMI 28.04 kg/m²     "

## 2019-06-21 NOTE — DISCHARGE SUMMARY
[facility]    Brief Operative Note     SUMMARY     Surgery Date: 6/21/2019     Surgeon(s) and Role:     * TALON Brink MD - Primary    Assisting Surgeon: None    Pre-op Diagnosis:  Non-traumatic rotator cuff tear, right [M75.101]    Post-op Diagnosis:  Post-Op Diagnosis Codes:     * Non-traumatic rotator cuff tear, right [M75.101]    Procedure: Procedure(s) (LRB):  ARTHROSCOPY, SHOULDER, BICEPS TENODESIS (Right)  ARTHROSCOPY, SHOULDER, WITH SUBACROMIAL SPACE DECOMPRESSION (Right)  SYNOVECTOMY, SHOULDER (Right)    Anesthesia: General    Description of the findings of the procedure: See Dictation    Findings/Key Components: right shoulder arthroscopic rotator cuff debridement, subacromial decompression, and subpect tenodesis     Estimated Blood Loss: * No values recorded between 6/21/2019  7:32 AM and 6/21/2019  8:51 AM *         Specimens:   Specimen (12h ago, onward)    None          Disposition: Patient tolerated the procedure well and was transferred to PACU in stable condition.      Discharge Note    SUMMARY     Admit Date: 6/21/2019    Discharge Date and Time:   06/21/2019 8:59 AM    Pre-op Diagnosis:  Non-traumatic rotator cuff tear, right [M75.101]    Post-op Diagnosis:  Post-Op Diagnosis Codes:     * Non-traumatic rotator cuff tear, right [M75.101]    Procedure: Procedure(s) (LRB):  ARTHROSCOPY, SHOULDER, BICEPS TENODESIS (Right)  ARTHROSCOPY, SHOULDER, WITH SUBACROMIAL SPACE DECOMPRESSION (Right)  SYNOVECTOMY, SHOULDER (Right)    Hospital Course (synopsis of major diagnoses, care, treatment, and services provided during the course of the hospital stay): right shoulder arthroscopic rotator cuff debridement, subacromial decompression, and subpect tenodesis      Final Diagnosis: Post-Op Diagnosis Codes:     * Non-traumatic rotator cuff tear, right [M75.101]    Disposition: Home or Self Care    Follow Up/Patient Instructions:     Medications:  Reconciled Home Medications:      Medication List       CONTINUE taking these medications    * aspirin 81 MG EC tablet  Commonly known as:  ECOTRIN  Take 81 mg by mouth once daily.     * aspirin 81 MG EC tablet  Commonly known as:  ECOTRIN  Take 1 tablet (81 mg total) by mouth 2 (two) times daily. for 14 days     azelastine 137 mcg (0.1 %) nasal spray  Commonly known as:  ASTELIN  U 1 SPR IEN BID     buPROPion 300 MG 24 hr tablet  Commonly known as:  WELLBUTRIN XL  Take 300 mg by mouth once daily.     LORazepam 0.5 MG tablet  Commonly known as:  ATIVAN  every evening.     meloxicam 15 MG tablet  Commonly known as:  MOBIC  Take 1 tablet (15 mg total) by mouth once daily.     ondansetron 4 MG tablet  Commonly known as:  ZOFRAN  Take 1 tablet (4 mg total) by mouth every 8 (eight) hours as needed.     oxyCODONE 5 MG immediate release tablet  Commonly known as:  ROXICODONE  Take 1-2 tablets (5-10 mg total) by mouth every 4 to 6 hours as needed for Pain.     rosuvastatin 10 MG tablet  Commonly known as:  CRESTOR  Take 10 mg by mouth every evening.     sertraline 50 MG tablet  Commonly known as:  ZOLOFT  every evening.         * This list has 2 medication(s) that are the same as other medications prescribed for you. Read the directions carefully, and ask your doctor or other care provider to review them with you.              Discharge Procedure Orders   Diet general     Call MD for:  temperature >100.4     Call MD for:  persistent nausea and vomiting     Call MD for:  severe uncontrolled pain     Call MD for:  difficulty breathing, headache or visual disturbances     Call MD for:  redness, tenderness, or signs of infection (pain, swelling, redness, odor or green/yellow discharge around incision site)     Call MD for:  hives     Call MD for:  persistent dizziness or light-headedness     Call MD for:  extreme fatigue     Remove dressing in 72 hours     Lifting restrictions

## 2019-06-21 NOTE — ANESTHESIA PREPROCEDURE EVALUATION
06/21/2019  Van Jones is a 53 y.o., male.    Anesthesia Evaluation         Review of Systems         Anesthesia Plan  Type of Anesthesia, risks & benefits discussed:  Anesthesia Type:  general  Patient's Preference:   Intra-op Monitoring Plan: standard ASA monitors  Intra-op Monitoring Plan Comments:   Post Op Pain Control Plan: multimodal analgesia and IV/PO Opioids PRN  Post Op Pain Control Plan Comments: Opioids and analgesic adjuvants as needed  Regional blocks if applicable/indicated  Induction:   IV  Beta Blocker:  Patient is not currently on a Beta-Blocker (No further documentation required).       Informed Consent: Patient understands risks and agrees with Anesthesia plan.  Questions answered. Anesthesia consent signed with patient.  ASA Score: 3     Day of Surgery Review of History & Physical:    H&P update referred to the surgeon.     Anesthesia Plan Notes: I have personally evaluated the patient and discussed risk/benefits/alternatives of general anesthesia.        Ready For Surgery From Anesthesia Perspective.

## 2019-06-21 NOTE — DISCHARGE INSTRUCTIONS

## 2019-06-21 NOTE — PROGRESS NOTES
Dr. Coronel and Dr. Marinelli at bedside assessing patient with ultrasound. Pt in NAD but remains with sats 90% on 2L via nasal cannula

## 2019-06-21 NOTE — ANESTHESIA PROCEDURE NOTES
Interscalene Brachial Plexus Catheter    Patient location during procedure: pre-op   Block not for primary anesthetic.  Reason for block: at surgeon's request and post-op pain management   Post-op Pain Location: right shoulder pain  Start time: 6/21/2019 6:30 AM  Timeout: 6/21/2019 6:30 AM   End time: 6/21/2019 6:50 AM    Staffing  Authorizing Provider: Adelina Saleem MD  Performing Provider: Adelina Saleem MD    Staffing  Anesthesiologist: Diamond Coronel MD  Resident/CRNA: Adelina Saleem MD  Performed: resident/CRNA   Preanesthetic Checklist  Completed: patient identified, site marked, surgical consent, pre-op evaluation, timeout performed, IV checked, risks and benefits discussed and monitors and equipment checked  Peripheral Block  Patient position: sitting  Prep: ChloraPrep and site prepped and draped  Patient monitoring: heart rate, cardiac monitor, continuous pulse ox, continuous capnometry and frequent blood pressure checks  Block type: interscalene  Laterality: right  Injection technique: continuous  Needle  Needle type: Tuohy   Needle gauge: 18 G  Needle length: 2 in  Needle localization: anatomical landmarks and ultrasound guidance  Catheter type: non-stimulating  Catheter size: 20 G  Test dose: lidocaine 1.5% with Epi 1-to-200,000 and negative   -ultrasound image captured on disc.  Assessment  Injection assessment: negative aspiration, negative parasthesia and local visualized surrounding nerve  Paresthesia pain: none  Heart rate change: no  Slow fractionated injection: yes  Additional Notes  VSS.  [facility] RN monitoring vitals throughout procedure.  Patient tolerated procedure well.

## 2019-06-21 NOTE — PLAN OF CARE
Discharge instructions reviewed w/ pt and parents, verbalized understanding. Pt in NADN. States pain tolerable at this time. Tolerated liquids w/ no issues. To be d/c'd home w/ parents. Polar ice and sling w/ pt.

## 2019-06-21 NOTE — PROGRESS NOTES
Dr. Langley at bedside to see pt, he would like the On Q ropivican ball  d/c'd and for someone to come and pull it out b/c this may be causing problems. Spoke w/ DR. Bergeron and he is going to come come and pull it out.

## 2019-06-21 NOTE — OP NOTE
[facility]   OPERATIVE REPORT   ORTHOPAEDIC SURGERY   PROVIDER: DR. SALAZAR LÓPEZ    PATIENT INFORMATION   Van Jones 53 y.o. male 1965   MRN: [id number]   LOCATION: [facility]     DATE OF PROCEDURE: 6/21/2019     PREOPERATIVE DIAGNOSES:   Right  1. Shoulder biceps tendinopathy    POSTOPERATIVE DIAGNOSES:   Right  1. Shoulder biceps proximal tendon split tear  2. Shoulder superior labral tear  3. Shoulder synovitis  4. Shoulder external impingement     OPERATION:   Right  1. Shoulder open subpectoral biceps tenodesis (CPT 16452)  2. Shoulder arthroscopic limited debridement (CPT 97129)    3. Shoulder arthroscopic subacromial decompression (CPT 22759)      Surgeon(s) and Role:     * TALON López MD - Primary     * Nehemiah Jarvis MD - Fellow     * SMA Dylan    ANESTHESIA: General with interscalene block    ESTIMATED BLOOD LOSS: 50 cc    IMPLANTS:   Implant Name Type Inv. Item Serial No.  Lot No. LRB No. Used   ANCHOR FORKED TIP 7MM PEEI - [id number]  ANCHOR FORKED TIP 7MM PEEI  [employer] [id number] 1        SPECIMENS:   Specimen (12h ago, onward)    None        COMPLICATIONS: None.     INTRAOPERATIVE COUNTS: Correct.     PROPHYLACTIC IV ANTIBIOTICS: Given per [facility] Protocol.    INDICATIONS FOR PROCEDURE:   Van Jones 53 y.o. male  has been seen and evaluated in the office for continued right shoulder pain and mechanical symptoms.  MRI and physical exam showed biceps tendinopathy. After a lengthy discussion and failed previous nonoperative management, the patient wished to proceed with surgical intervention and was fully informed of risks and benefits.    DETAILS OF PROCEDURE:  After the correct operative site was marked by the operating surgeon, an interscalene block was administered by the anesthesia team.  The patient was then taken to the operating room and placed supine on the operating room table, where the patient underwent general anesthesia by  the anesthesia team.  The patient was then rolled into the lateral decubitus position with the operative side up.  A well-padded axillary roll, beanbag and pillows were placed.  All pressure points were carefully padded and checked.  The upper extremities and both lower extremities were placed in comfortable positions and were also well-padded.      A verbal timeout was confirmed to identify the patient, operative site and planned operative procedure. It was also confirmed the patient had received preoperative IV antibiotic per protocol.     Examination under anesthesia demonstrated: Forward elevation 180 degrees, external rotation with arm to side 60 degrees, internal rotation at 90 abduction 70 degrees; grade 1 anterior and posterior load and shift, negative sulcus.    The operative upper extremity was then prepped and draped in the usual sterile fashion.     The Spider arm positioner was implemented with balanced suspension and appropriate landmarks were noted on the skin.  A posterior followed by glenn-superior portals were created and systematic examination of the joint revealed the following:      -Biceps split complex tearing extending to the root attachment with superior labral tearing. The biceps tear extended into zone 2.  -Diffuse synovitis from anterior to posterior with labral fraying  -A posterior push-pull maneuver with probing was performing demonstrating an intact subscapularis  -The undersurface of the superior cuff was intact  -No loose bodies  -No significant chondromalacia    A shaver was again brought in to clear the field of view and to debride torn superior labrum and biceps root. Synovitis was also removed with the shaver and ablator to complete the limited debridement.     The biceps was released with Metzenbaum scissors.     Attention was then turned to the subacromial space where significant hypertrophic bursa was encountered. Through an anterolateral working portal, shaver and cautery  devices were introduced to clear the subacromial space of bursa and adhesions. Bursal reflections to the deltoid fascia anteriorly and posteriorly were taken down to further expand this space. This created a nice room with a view. The undersurface of the acromion was exposed with cautery to delineate bony anatomy. Systematic examination of this space revealed the following:    -Mild subacromial spurring with narrowing of the anterolateral subacromial interval  -Fraying and degeneration of the CA ligament indicative of some component of outlet impingement  -There was minimal fraying of the bursal surface of the rotator cuff, otherwise no tear.     Subacromial decompression was completed using posterior cutting block technique in the standard fashion with a 4.5 mm nikolai without difficulty.  The anterior osteophyte was flattened.  Confirmation of adequate resection was confirmed while viewing from the lateral portal and referencing from the posterior acromial undersurface.    The shoulder and subacromial space were then irrigated and fluid was extravasated using suction.     The instruments were removed from the shoulder. An axillary incision was made overlying the pectoralis inferior border, measuring 3-4 cm in length. The interval between the pectoralis major and medial conjoined and biceps short head was developed bluntly. Dissection was carried down to the humerus and the biceps long head tendon was retrieved from the wound. There was more distal extension of the split tear noted with a large flap of torn biceps rolled up on itself and scarred down to the remainder of the tendon. The proximal 20 mm of tendon from the musculotendinous junction was whipstitched. A guidepin was placed approximately 10 mm proximal to the inferior crossing border of the pectoralis. A 7 mm reamer was used to create a unicortical hole. The tendon was then dunked in the hold with a 7 mm Peek tenodesis screw for tendon reattachment.  Excellent fixation achieved. Sutures were then tied down for suture anchor type back-up fixation. The wound was then thoroughly irrigated.     All portals were reapproximated using 3-0 Nylon. The axillary wound was closed with 3-0 Vicryl and 3-0 Monocryl. Dermabond Prineo was placed over the axillary wound. Xeroform and absorbant mepilex pads were placed to cover all incisions.  A polar care shoulder sleeve was secured followed by a  sling with abduction pillow. The patient was then repositioned supine, extubated and taken to the recovery room where the patient arrived in stable condition with the compartments of the arm and forearm soft and good perfusion in all digits.     POSTOPERATIVE PLAN OF CARE:  -Patient will be discharged home according to protocol.  -Physical Therapy: Follow the biceps protocol. PROM of the shoulder and elbow x 1 week, then may begin AROM. No biceps resistive activity for 8 weeks.  -DVT prophylaxis: ASA 81 mg twice a day x 2 weeks. I've also set him up to have a home SCD unit for 2 weeks given his prior history of thrombosis. Not on prophylactic medication preop.

## 2019-06-21 NOTE — INTERVAL H&P NOTE
The patient has been examined and the H&P has been reviewed:    I concur with the findings and no changes have occurred since H&P was written.    Anesthesia/Surgery risks, benefits and alternative options discussed and understood by patient/family.          Active Hospital Problems    Diagnosis  POA    Biceps tendon tear [S46.219A]  Yes      Resolved Hospital Problems   No resolved problems to display.

## 2019-06-21 NOTE — PROGRESS NOTES
Informed Dr. Bob that pt is still here b/c his O2 will not stay above 90% on RA. Ordered to give IS, pt instructed how to do use it.

## 2019-06-21 NOTE — PROGRESS NOTES
Called to patient's bedside for assistance with management of the patient's PNC.  Upon arrival, patient was maintaining SpO2 of 90-92 on 5L NC.  No history of abnormal pulmonary physiology outside of a remote PE.  Concern for, among other pulmonary complications, the effect of the interscalene PNC on respiratory function, specifically with regard to phrenic nerve paralysis.  In conjunction with Dr. Brink, decision was made to remove PNC to avoid any potential worsening or potentiation of respiratory compromise.  Catheter pulled, blue tipped confirmed intact.

## 2019-06-21 NOTE — NURSING TRANSFER
Nursing Transfer Note      6/21/2019     Transfer 511 A from phase 2    Transfer via stretcher    Transfer with belongings, glasses, cell phone, and polar ice    Transported by escort    Medicines sent: none    Chart send with patient: yes    Notified: Floor nurse, pts parents    Patient reassessed at:  Upon arrival to floor:

## 2019-06-22 ENCOUNTER — PATIENT MESSAGE (OUTPATIENT)
Dept: ADMINISTRATIVE | Facility: OTHER | Age: 54
End: 2019-06-22

## 2019-06-22 ENCOUNTER — PATIENT MESSAGE (OUTPATIENT)
Dept: NEUROLOGY | Facility: CLINIC | Age: 54
End: 2019-06-22

## 2019-06-22 VITALS
HEART RATE: 84 BPM | BODY MASS INDEX: 28.09 KG/M2 | TEMPERATURE: 98 F | RESPIRATION RATE: 18 BRPM | SYSTOLIC BLOOD PRESSURE: 113 MMHG | HEIGHT: 67 IN | DIASTOLIC BLOOD PRESSURE: 69 MMHG | WEIGHT: 179 LBS | OXYGEN SATURATION: 93 %

## 2019-06-22 PROCEDURE — 25000003 PHARM REV CODE 250: Performed by: ORTHOPAEDIC SURGERY

## 2019-06-22 RX ADMIN — OXYCODONE HYDROCHLORIDE 10 MG: 10 TABLET ORAL at 03:06

## 2019-06-22 RX ADMIN — ASPIRIN 81 MG: 81 TABLET, COATED ORAL at 08:06

## 2019-06-22 RX ADMIN — HYDROCODONE BITARTRATE AND ACETAMINOPHEN 1 TABLET: 5; 325 TABLET ORAL at 08:06

## 2019-06-22 RX ADMIN — OXYCODONE HYDROCHLORIDE 10 MG: 10 TABLET ORAL at 12:06

## 2019-06-22 NOTE — PROGRESS NOTES
"[facility]  Orthopedics  Progress Note    Patient Name: Van Jones  MRN: [id number]  Admission Date: 6/21/2019  Hospital Length of Stay: 0 days  Attending Provider: TALON Brink MD  Primary Care Provider: Ria Fermin MD  Follow-up For: Procedure(s) (LRB):  ARTHROSCOPY, SHOULDER, BICEPS TENODESIS (Right)  ARTHROSCOPY, SHOULDER, WITH SUBACROMIAL SPACE DECOMPRESSION (Right)  SYNOVECTOMY, SHOULDER (Right)    Post-Operative Day: 1 Day Post-Op  Subjective:     Principal Problem:Biceps tendon tear    Principal Orthopedic Problem: Same    Interval History: Patient seen and examined at bedside.  No acute events overnight.  Pain controlled.  No issues with breathing overnight patient is saturating 93-96 on 2 L of oxygen      Review of patient's allergies indicates:   Allergen Reactions    Lamisil [terbinafine hcl] Rash       Current Facility-Administered Medications   Medication    aspirin EC tablet 81 mg    HYDROcodone-acetaminophen 5-325 mg per tablet 1 tablet    LORazepam tablet 0.5 mg    metoclopramide HCl injection 5 mg    ondansetron disintegrating tablet 8 mg    oxyCODONE immediate release tablet 10 mg    ropivacaine 0.2% ON-Q C-BLOC 400 ML (SELECT A FLOW)    rosuvastatin tablet 10 mg    sertraline tablet 50 mg    sodium chloride 0.9% flush 10 mL    sodium chloride 0.9% flush 10 mL    sodium chloride 0.9% flush 10 mL    traMADol tablet 50 mg     Objective:     Vital Signs (Most Recent):  Temp: 97.3 °F (36.3 °C) (06/22/19 0329)  Pulse: 77 (06/22/19 0329)  Resp: 17 (06/22/19 0329)  BP: 120/77 (06/22/19 0329)  SpO2: (!) 94 % (06/22/19 0329) Vital Signs (24h Range):  Temp:  [96.4 °F (35.8 °C)-97.9 °F (36.6 °C)] 97.3 °F (36.3 °C)  Pulse:  [] 77  Resp:  [13-22] 17  SpO2:  [89 %-100 %] 94 %  BP: (101-137)/(58-82) 120/77     Weight: 81.2 kg (179 lb)  Height: 5' 7" (170.2 cm)  Body mass index is 28.04 kg/m².      Intake/Output Summary (Last 24 hours) at 6/22/2019 0744  Last " data filed at 6/22/2019 0513  Gross per 24 hour   Intake 550 ml   Output --   Net 550 ml       Ortho/SPM Exam    Physical Exam:  NAD, A/O x 3.  Wound c/d/i with clean dressing.  No focal motor or sensory deficits noted.      Significant Labs: All pertinent labs within the past 24 hours have been reviewed.    Significant Imaging: I have reviewed and interpreted all pertinent imaging results/findings.    Assessment/Plan:     * Biceps tendon tear  Van Jones is a 53 y.o. male POD 1 status post right shoulder scope with biceps tenodesis.       - Pain control: Per APS  - Antibiotics: ling op  - DVT Prophylaxis: ASA 81 , SCD's at all times when not ambulating.  - PT/OT  - Dispo: dc home today                  He Stauffer MD  Orthopedics  [facility]

## 2019-06-22 NOTE — NURSING
Pt discharged home, awaiting ride. Aaox4. VSS. Shoulder drsg clean, intact; sling on. Polar ice home with Pt. D/C instructions given -- understanding verbalized

## 2019-06-22 NOTE — ASSESSMENT & PLAN NOTE
Van Jones is a 53 y.o. male POD 1 status post right shoulder scope with biceps tenodesis.       - Pain control: Per APS  - Antibiotics: ling op  - DVT Prophylaxis: ASA 81 , SCD's at all times when not ambulating.  - PT/OT  - Dispo: MT home today

## 2019-06-22 NOTE — SUBJECTIVE & OBJECTIVE
"Principal Problem:Biceps tendon tear    Principal Orthopedic Problem: Same    Interval History: Patient seen and examined at bedside.  No acute events overnight.  Pain controlled.  No issues with breathing overnight patient is saturating 93-96 on 2 L of oxygen      Review of patient's allergies indicates:   Allergen Reactions    Lamisil [terbinafine hcl] Rash       Current Facility-Administered Medications   Medication    aspirin EC tablet 81 mg    HYDROcodone-acetaminophen 5-325 mg per tablet 1 tablet    LORazepam tablet 0.5 mg    metoclopramide HCl injection 5 mg    ondansetron disintegrating tablet 8 mg    oxyCODONE immediate release tablet 10 mg    ropivacaine 0.2% ON-Q C-BLOC 400 ML (SELECT A FLOW)    rosuvastatin tablet 10 mg    sertraline tablet 50 mg    sodium chloride 0.9% flush 10 mL    sodium chloride 0.9% flush 10 mL    sodium chloride 0.9% flush 10 mL    traMADol tablet 50 mg     Objective:     Vital Signs (Most Recent):  Temp: 97.3 °F (36.3 °C) (06/22/19 0329)  Pulse: 77 (06/22/19 0329)  Resp: 17 (06/22/19 0329)  BP: 120/77 (06/22/19 0329)  SpO2: (!) 94 % (06/22/19 0329) Vital Signs (24h Range):  Temp:  [96.4 °F (35.8 °C)-97.9 °F (36.6 °C)] 97.3 °F (36.3 °C)  Pulse:  [] 77  Resp:  [13-22] 17  SpO2:  [89 %-100 %] 94 %  BP: (101-137)/(58-82) 120/77     Weight: 81.2 kg (179 lb)  Height: 5' 7" (170.2 cm)  Body mass index is 28.04 kg/m².      Intake/Output Summary (Last 24 hours) at 6/22/2019 0744  Last data filed at 6/22/2019 0513  Gross per 24 hour   Intake 550 ml   Output --   Net 550 ml       Ortho/SPM Exam    Physical Exam:  NAD, A/O x 3.  Wound c/d/i with clean dressing.  No focal motor or sensory deficits noted.      Significant Labs: All pertinent labs within the past 24 hours have been reviewed.    Significant Imaging: I have reviewed and interpreted all pertinent imaging results/findings.  "

## 2019-06-22 NOTE — PLAN OF CARE
On call CM requested to see if patient had any discharge needs.  CM met with patient and patient stated there were no needs at this time.

## 2019-06-23 ENCOUNTER — PATIENT MESSAGE (OUTPATIENT)
Dept: ADMINISTRATIVE | Facility: OTHER | Age: 54
End: 2019-06-23

## 2019-06-23 NOTE — ANESTHESIA POSTPROCEDURE EVALUATION
Anesthesia Post Evaluation    Patient: Van Jones    Procedure(s) Performed: Procedure(s) (LRB):  ARTHROSCOPY, SHOULDER, BICEPS TENODESIS (Right)  ARTHROSCOPY, SHOULDER, WITH SUBACROMIAL SPACE DECOMPRESSION (Right)  SYNOVECTOMY, SHOULDER (Right)    Final Anesthesia Type: general  Patient location during evaluation: PACU  Patient participation: Yes- Able to Participate  Level of consciousness: awake and alert and oriented  Post-procedure vital signs: reviewed and stable  Pain management: adequate  Airway patency: patent  PONV status at discharge: No PONV  Anesthetic complications: no      Cardiovascular status: blood pressure returned to baseline and hemodynamically stable  Respiratory status: unassisted, room air and spontaneous ventilation  Hydration status: euvolemic  Follow-up not needed.          Vitals Value Taken Time   /69 6/22/2019 11:50 AM   Temp 36.8 °C (98.3 °F) 6/22/2019 11:50 AM   Pulse 84 6/22/2019 11:50 AM   Resp 18 6/22/2019 11:50 AM   SpO2 93 % 6/22/2019 11:50 AM         No case tracking events are documented in the log.      Pain/Amrit Score: Pain Rating Prior to Med Admin: 7 (6/22/2019 12:11 PM)

## 2019-06-24 ENCOUNTER — TELEPHONE (OUTPATIENT)
Dept: SPORTS MEDICINE | Facility: CLINIC | Age: 54
End: 2019-06-24

## 2019-06-24 NOTE — TELEPHONE ENCOUNTER
----- Message from Krystyna Ramirez sent at 6/24/2019  8:47 AM CDT -----  Contact: [email or web address]  Needs Advice    Reason for call:patient stated he would like physical therapy ordered for his shoulder please call patient.        Communication Preference:Home#    Additional Information:

## 2019-06-24 NOTE — TELEPHONE ENCOUNTER
Returned pt call and stated he was wondering if PT could help him with his brace. I told them when he goes to PT they should be able to let him know if they could help him, and if not they could at least give him tips with the brace. He said he didn't need another referral.

## 2019-06-26 ENCOUNTER — TELEPHONE (OUTPATIENT)
Dept: SPORTS MEDICINE | Facility: CLINIC | Age: 54
End: 2019-06-26

## 2019-06-26 DIAGNOSIS — S46.219A BICEPS TENDON TEAR: ICD-10-CM

## 2019-06-26 RX ORDER — OXYCODONE HYDROCHLORIDE 5 MG/1
5-10 TABLET ORAL EVERY 6 HOURS PRN
Qty: 28 TABLET | Refills: 0 | Status: SHIPPED | OUTPATIENT
Start: 2019-06-26 | End: 2020-02-12

## 2019-06-28 ENCOUNTER — PATIENT MESSAGE (OUTPATIENT)
Dept: SPORTS MEDICINE | Facility: CLINIC | Age: 54
End: 2019-06-28

## 2019-06-28 ENCOUNTER — PATIENT MESSAGE (OUTPATIENT)
Dept: ADMINISTRATIVE | Facility: OTHER | Age: 54
End: 2019-06-28

## 2019-07-05 ENCOUNTER — OFFICE VISIT (OUTPATIENT)
Dept: SPORTS MEDICINE | Facility: CLINIC | Age: 54
End: 2019-07-05
Payer: MEDICARE

## 2019-07-05 VITALS
BODY MASS INDEX: 28.09 KG/M2 | SYSTOLIC BLOOD PRESSURE: 127 MMHG | WEIGHT: 179 LBS | HEART RATE: 93 BPM | HEIGHT: 67 IN | DIASTOLIC BLOOD PRESSURE: 89 MMHG

## 2019-07-05 DIAGNOSIS — Z98.890 S/P ARTHROSCOPY OF SHOULDER: ICD-10-CM

## 2019-07-05 DIAGNOSIS — S46.219A BICEPS TENDON TEAR: Primary | ICD-10-CM

## 2019-07-05 DIAGNOSIS — M75.101 NON-TRAUMATIC ROTATOR CUFF TEAR, RIGHT: ICD-10-CM

## 2019-07-05 PROCEDURE — 99024 PR POST-OP FOLLOW-UP VISIT: ICD-10-PCS | Mod: POP,,, | Performed by: PHYSICIAN ASSISTANT

## 2019-07-05 PROCEDURE — 99024 POSTOP FOLLOW-UP VISIT: CPT | Mod: POP,,, | Performed by: PHYSICIAN ASSISTANT

## 2019-07-05 PROCEDURE — 99213 OFFICE O/P EST LOW 20 MIN: CPT | Mod: PBBFAC,PO | Performed by: PHYSICIAN ASSISTANT

## 2019-07-05 PROCEDURE — 99999 PR PBB SHADOW E&M-EST. PATIENT-LVL III: CPT | Mod: PBBFAC,,, | Performed by: PHYSICIAN ASSISTANT

## 2019-07-05 PROCEDURE — 99999 PR PBB SHADOW E&M-EST. PATIENT-LVL III: ICD-10-PCS | Mod: PBBFAC,,, | Performed by: PHYSICIAN ASSISTANT

## 2019-07-05 RX ORDER — HYDROCODONE BITARTRATE AND ACETAMINOPHEN 7.5; 325 MG/1; MG/1
1 TABLET ORAL EVERY 6 HOURS PRN
Qty: 25 TABLET | Refills: 0 | Status: SHIPPED | OUTPATIENT
Start: 2019-07-05 | End: 2020-02-12

## 2019-07-05 NOTE — PROGRESS NOTES
S:Van Jones presents for post-operative evaluation.     DATE OF PROCEDURE: 6/21/2019      PREOPERATIVE DIAGNOSES:   Right  1. Shoulder biceps tendinopathy    POSTOPERATIVE DIAGNOSES:   Right  1. Shoulder biceps proximal tendon split tear  2. Shoulder superior labral tear  3. Shoulder synovitis  4. Shoulder external impingement     OPERATION:   Right  1. Shoulder open subpectoral biceps tenodesis (CPT 72480)  2. Shoulder arthroscopic limited debridement (CPT 39581)    3. Shoulder arthroscopic subacromial decompression (CPT 96364)       Surgeon(s) and Role:     * TALON Brink MD - Primary     * Nehemiah Jarvis MD - Fellow     * Mackenzie Crowder, [facility]    Van Jones reports to be doing well 2wk s/p the above mentioned procedure. Denies fevers, chills, night sweats, chest pain, difficulty breathing, calf pain or tenderness. Going to PT 2-3xWeek at the [facility] in [place]. Seeing good progress daily. Pain levels are improving. Taking pain medication as needed.     O: The incisions are healing well.  No signs of infection.  Sutures were removed. No significant pain or unusual tenderness.    A/P: Arthroscopic pictures were reviewed with the patient. Physical Therapy: Follow the biceps protocol. PROM of the shoulder and elbow x 1 week, then may begin AROM. No biceps resistive activity for 8 weeks.  -DVT prophylaxis: ASA 81 mg twice a day x 2 weeks. I've also set him up to have a home SCD unit for 2 weeks given his prior history of thrombosis. Norco 7.5 mg Rx today. Plan to follow the rehab plan as previously outlined. RTC in 4 weeks.

## 2019-07-11 NOTE — ADDENDUM NOTE
Addendum  created 07/11/19 0658 by Villa Marinelli MD    Diagnosis association updated, Intraprocedure Blocks edited, Sign clinical note

## 2019-07-14 ENCOUNTER — PATIENT MESSAGE (OUTPATIENT)
Dept: SPORTS MEDICINE | Facility: CLINIC | Age: 54
End: 2019-07-14

## 2019-07-15 ENCOUNTER — TELEPHONE (OUTPATIENT)
Dept: SPORTS MEDICINE | Facility: CLINIC | Age: 54
End: 2019-07-15

## 2019-07-15 DIAGNOSIS — Z98.890 S/P ARTHROSCOPY OF RIGHT SHOULDER: Primary | ICD-10-CM

## 2019-07-15 RX ORDER — HYDROCODONE BITARTRATE AND ACETAMINOPHEN 7.5; 325 MG/1; MG/1
1 TABLET ORAL EVERY 12 HOURS PRN
Qty: 20 TABLET | Refills: 0 | Status: SHIPPED | OUTPATIENT
Start: 2019-07-15 | End: 2020-02-12

## 2019-07-15 NOTE — TELEPHONE ENCOUNTER
Called patient to ask which prescription he wanted filled. Let patient know Anaya will refill his [place]

## 2019-07-17 ENCOUNTER — PATIENT MESSAGE (OUTPATIENT)
Dept: SPORTS MEDICINE | Facility: CLINIC | Age: 54
End: 2019-07-17

## 2019-07-24 ENCOUNTER — PATIENT MESSAGE (OUTPATIENT)
Dept: ADMINISTRATIVE | Facility: OTHER | Age: 54
End: 2019-07-24

## 2019-08-05 NOTE — PROGRESS NOTES
S:Van Jones presents for post-operative evaluation.     DATE OF PROCEDURE: 6/21/2019   OPERATION:   Right  1. Shoulder open subpectoral biceps tenodesis  2. Shoulder arthroscopic limited debridement    3. Shoulder arthroscopic subacromial decompression      Van Jones reports to be doing well 6 wk s/p the above mentioned procedure. Has d/c sling since LOV. Going to PT 2-3xWeek at the [facility] in [place] with continued progress. Reports taking a Norco prior to therapy. States he is much better than pre op. Pleased thus far.     O:  Right shoulder exam demonstrates the incisions are well healed. No signs of infection. No significant pain or unusual tenderness. Dyskinetic scapula. Biceps appears well tensioned. Active FE in the scapular plane to 130-140, passive to 150-160. Passive ER to 30 with some stiffness.  No miguel angel deformity. NVI distally.     A/P: Arthroscopic pictures were reviewed with the patient. Plan to follow the rehab plan as previously outlined. Discussed a focus on regaining terminal motion. Discussed weaning of the Norco. Naproxen sent to the pharmacy which he will take scheduled. No biceps resistive activity for 2 more weeks. RTC in 6 weeks. Al questions answered.

## 2019-08-06 ENCOUNTER — OFFICE VISIT (OUTPATIENT)
Dept: SPORTS MEDICINE | Facility: CLINIC | Age: 54
End: 2019-08-06
Payer: MEDICARE

## 2019-08-06 VITALS
HEIGHT: 67 IN | SYSTOLIC BLOOD PRESSURE: 116 MMHG | WEIGHT: 179 LBS | HEART RATE: 90 BPM | DIASTOLIC BLOOD PRESSURE: 78 MMHG | BODY MASS INDEX: 28.09 KG/M2

## 2019-08-06 DIAGNOSIS — Z98.890 S/P ARTHROSCOPY OF RIGHT SHOULDER: Primary | ICD-10-CM

## 2019-08-06 DIAGNOSIS — Z47.89 SURGICAL AFTERCARE, MUSCULOSKELETAL SYSTEM: ICD-10-CM

## 2019-08-06 PROCEDURE — 99213 OFFICE O/P EST LOW 20 MIN: CPT | Mod: PBBFAC,PO | Performed by: ORTHOPAEDIC SURGERY

## 2019-08-06 PROCEDURE — 99999 PR PBB SHADOW E&M-EST. PATIENT-LVL III: CPT | Mod: PBBFAC,,, | Performed by: ORTHOPAEDIC SURGERY

## 2019-08-06 PROCEDURE — 99024 PR POST-OP FOLLOW-UP VISIT: ICD-10-PCS | Mod: POP,,, | Performed by: ORTHOPAEDIC SURGERY

## 2019-08-06 PROCEDURE — 99999 PR PBB SHADOW E&M-EST. PATIENT-LVL III: ICD-10-PCS | Mod: PBBFAC,,, | Performed by: ORTHOPAEDIC SURGERY

## 2019-08-06 PROCEDURE — 99024 POSTOP FOLLOW-UP VISIT: CPT | Mod: POP,,, | Performed by: ORTHOPAEDIC SURGERY

## 2019-08-06 RX ORDER — NAPROXEN 500 MG/1
500 TABLET ORAL 2 TIMES DAILY
Qty: 60 TABLET | Refills: 1 | Status: SHIPPED | OUTPATIENT
Start: 2019-08-06 | End: 2020-02-12

## 2019-08-26 ENCOUNTER — PATIENT MESSAGE (OUTPATIENT)
Dept: ADMINISTRATIVE | Facility: OTHER | Age: 54
End: 2019-08-26

## 2019-09-17 ENCOUNTER — OFFICE VISIT (OUTPATIENT)
Dept: SPORTS MEDICINE | Facility: CLINIC | Age: 54
End: 2019-09-17
Payer: MEDICARE

## 2019-09-17 VITALS
DIASTOLIC BLOOD PRESSURE: 83 MMHG | BODY MASS INDEX: 28.09 KG/M2 | HEART RATE: 114 BPM | SYSTOLIC BLOOD PRESSURE: 125 MMHG | HEIGHT: 67 IN | WEIGHT: 179 LBS

## 2019-09-17 DIAGNOSIS — Z98.890 S/P ARTHROSCOPY OF RIGHT SHOULDER: Primary | ICD-10-CM

## 2019-09-17 DIAGNOSIS — Z47.89 SURGICAL AFTERCARE, MUSCULOSKELETAL SYSTEM: ICD-10-CM

## 2019-09-17 PROCEDURE — 99999 PR PBB SHADOW E&M-EST. PATIENT-LVL IV: ICD-10-PCS | Mod: PBBFAC,,, | Performed by: PHYSICIAN ASSISTANT

## 2019-09-17 PROCEDURE — 99214 OFFICE O/P EST MOD 30 MIN: CPT | Mod: PBBFAC,PO | Performed by: PHYSICIAN ASSISTANT

## 2019-09-17 PROCEDURE — 99999 PR PBB SHADOW E&M-EST. PATIENT-LVL IV: CPT | Mod: PBBFAC,,, | Performed by: PHYSICIAN ASSISTANT

## 2019-09-17 PROCEDURE — 99024 POSTOP FOLLOW-UP VISIT: CPT | Mod: POP,,, | Performed by: PHYSICIAN ASSISTANT

## 2019-09-17 PROCEDURE — 99024 PR POST-OP FOLLOW-UP VISIT: ICD-10-PCS | Mod: POP,,, | Performed by: PHYSICIAN ASSISTANT

## 2019-09-17 NOTE — PROGRESS NOTES
S:Van Jones presents for post-operative evaluation.     DATE OF PROCEDURE: 6/21/2019   OPERATION:   Right  1. Shoulder open subpectoral biceps tenodesis  2. Shoulder arthroscopic limited debridement    3. Shoulder arthroscopic subacromial decompression      Van Jones reports to be doing well 12 wk s/p the above mentioned procedure. Going to PT 2-3xWeek at the [facility] in [place] with continued progress. D/c'd pain meds. States he is much better than pre op. Pleased thus far.     O:  Right shoulder exam demonstrates the incisions are well healed. No signs of infection. No significant pain or unusual tenderness. Dyskinetic scapula. Biceps appears well tensioned. Active FE in the scapular plane to 140-150, passive to 150-160. Passive ER to 40 with some stiffness.  No miguel angel deformity. NVI distally.     A/P: Arthroscopic pictures were reviewed with the patient. Plan to follow the rehab plan as previously outlined. Discussed a focus on regaining terminal motion. New physical therapy Rx today. RTC in 6 weeks. All questions answered.

## 2019-09-23 ENCOUNTER — PATIENT MESSAGE (OUTPATIENT)
Dept: ADMINISTRATIVE | Facility: OTHER | Age: 54
End: 2019-09-23

## 2019-09-23 NOTE — PROGRESS NOTES
CC: Follow up right shoulder     DATE OF PROCEDURE: 6/21/2019   OPERATION:   Right  1. Shoulder open subpectoral biceps tenodesis  2. Shoulder arthroscopic limited debridement    3. Shoulder arthroscopic subacromial decompression     Van Jones returns to clinic 3 mos s/p the above mentioned procedure. Continuing therapy at [facility] in [place]. Reports an increase of R anterior shoulder pain x 1 week. Atraumatic onset. Localizes over the biceps tenodesis site. Denies feeling a pop. Pain worse with overhead motion. No N/T.     Pain Score:   5    PAST MEDICAL HISTORY:   Past Medical History:   Diagnosis Date    Broken femur     Deep vein thrombosis     Hyperlipidemia     Nerve damage left    Pulmonary embolism     Reflex sympathetic dystrophy 1983    Left leg after broken femur surgery       PAST SURGICAL HISTORY:  Past Surgical History:   Procedure Laterality Date    ARTHROSCOPY OF SHOULDER WITH DECOMPRESSION OF SUBACROMIAL SPACE Right 6/21/2019    Procedure: ARTHROSCOPY, SHOULDER, WITH SUBACROMIAL SPACE DECOMPRESSION;  Surgeon: TALON Brink MD;  Location: [facility] OR [address] [place] [address];  Service: Orthopedics;  Laterality: Right;    CERVICAL DISC ARTHROPLASTY  01/23/2018    C3/4    COLONOSCOPY N/A 2/25/2016    Procedure: COLONOSCOPY;  Surgeon: Daryl Viramontes MD;  Location: [facility] (4TH FLR);  Service: Endoscopy;  Laterality: N/A;  PM Prep      FEMUR SURGERY      HERNIA REPAIR      umbilical    SHOULDER ARTHROSCOPY Right 6/21/2019    Procedure: ARTHROSCOPY, SHOULDER, BICEPS TENODESIS;  Surgeon: TALON Brink MD;  Location: [facility] OR [address] [place] [address];  Service: Orthopedics;  Laterality: Right;  REGIONAL W/CATHETER INTERSCALENE  [employer] notified 6-19 LO  Broderick/arthrex notified 6-20 LO    SYNOVECTOMY OF SHOULDER Right 6/21/2019    Procedure: SYNOVECTOMY, SHOULDER;  Surgeon: TALON Brink MD;  Location: [facility] OR [address] [place] [address];  Service: Orthopedics;  Laterality: Right;    TONSILLECTOMY         FAMILY HISTORY:  Family  "History   Problem Relation Age of Onset    Heart disease Mother 60    Neuropathy Mother     Heart disease Father 45       MEDICATIONS:    Current Outpatient Medications:     aspirin (ECOTRIN) 81 MG EC tablet, Take 81 mg by mouth once daily., Disp: , Rfl:     azelastine (ASTELIN) 137 mcg (0.1 %) nasal spray, U 1 SPR IEN BID, Disp: , Rfl: 0    buPROPion (WELLBUTRIN XL) 300 MG 24 hr tablet, Take 300 mg by mouth once daily. , Disp: , Rfl:     HYDROcodone-acetaminophen (NORCO) 7.5-325 mg per tablet, Take 1 tablet by mouth every 6 (six) hours as needed for Pain., Disp: 25 tablet, Rfl: 0    HYDROcodone-acetaminophen (NORCO) 7.5-325 mg per tablet, Take 1 tablet by mouth every 12 (twelve) hours as needed for Pain., Disp: 20 tablet, Rfl: 0    LORazepam (ATIVAN) 0.5 MG tablet, every evening. , Disp: , Rfl:     naproxen (NAPROSYN) 500 MG tablet, Take 1 tablet (500 mg total) by mouth 2 (two) times daily., Disp: 60 tablet, Rfl: 1    oxyCODONE (ROXICODONE) 5 MG immediate release tablet, Take 1-2 tablets (5-10 mg total) by mouth every 6 (six) hours as needed for Pain., Disp: 28 tablet, Rfl: 0    rosuvastatin (CRESTOR) 10 MG tablet, Take 10 mg by mouth every evening. , Disp: , Rfl:     sertraline (ZOLOFT) 50 MG tablet, every evening. , Disp: , Rfl:     ALLERGIES:  Review of patient's allergies indicates:   Allergen Reactions    Lamisil [terbinafine hcl] Rash     REVIEW OF SYSTEMS:  Constitution: Negative. Negative for chills, fever and night sweats.    Hematologic/Lymphatic: Negative for bleeding problem. Does not bruise/bleed easily.   Skin: Negative for dry skin, itching and rash.   Musculoskeletal: Negative for falls. Positive for right shoulder pain and  muscle weakness.     All other review of symptoms were reviewed and found to be noncontributory.     PHYSICAL EXAMINATION:  Vitals:  /78   Pulse 93   Ht 5' 7" (1.702 m)   Wt 81.2 kg (179 lb)   BMI 28.04 kg/m²    General: Well-developed well-nourished 54 " y.o. malein no acute distress   Cardiovascular: Regular rhythm by palpation of distal pulse, normal color and temperature, no concerning varicosities on symptomatic side   Lungs: No labored breathing or wheezing appreciated   Neuro: Alert and oriented ×3   Psychiatric: well oriented to person, place and time, demonstrates normal mood and affect   Skin: No rashes, lesions or ulcers, normal temperature, turgor, and texture on uninvolved extremity    Ortho/SPM Exam  Examination of the right shoulder demonstrates well healed incisions. No signs of infection. Prominent tenderness over the biceps tenodesis site. Biceps appears well tensioned. Active FE in the scapular plane to 150, passive to 170. Passive ER to 40 with some stiffness. 5/5 resisted supraspinatus and infraspinatus testing. Negative miguel angel deformity. NVI distally.     IMAGING: No new images.     ASSESSMENT:      ICD-10-CM ICD-9-CM   1. S/P arthroscopy of right shoulder Z98.890 V45.89     Soft tissue irritation of the biceps tenodesis site.    PLAN:     Van has rehabbed the shoulder well. Primarily symptomatic from his biceps tenodesis site today. The tenodesis screw sometimes can be bothersome.  The patient denies any significant concern in regards to this at this time. Celebrex sent to the pharmacy to assist with flare up.  Can consider a localized steroid injection in the future as needed. Continue therapy according to protocol. RTC in 6 weeks or sooner if needed.       Procedures

## 2019-09-24 ENCOUNTER — OFFICE VISIT (OUTPATIENT)
Dept: SPORTS MEDICINE | Facility: CLINIC | Age: 54
End: 2019-09-24
Payer: MEDICARE

## 2019-09-24 VITALS
SYSTOLIC BLOOD PRESSURE: 115 MMHG | HEIGHT: 67 IN | BODY MASS INDEX: 28.09 KG/M2 | DIASTOLIC BLOOD PRESSURE: 78 MMHG | HEART RATE: 93 BPM | WEIGHT: 179 LBS

## 2019-09-24 DIAGNOSIS — Z98.890 S/P ARTHROSCOPY OF RIGHT SHOULDER: ICD-10-CM

## 2019-09-24 DIAGNOSIS — M25.611 STIFFNESS OF RIGHT SHOULDER JOINT: Primary | ICD-10-CM

## 2019-09-24 PROCEDURE — 99213 OFFICE O/P EST LOW 20 MIN: CPT | Mod: PBBFAC,PO | Performed by: ORTHOPAEDIC SURGERY

## 2019-09-24 PROCEDURE — 99213 PR OFFICE/OUTPT VISIT, EST, LEVL III, 20-29 MIN: ICD-10-PCS | Mod: S$PBB,,, | Performed by: ORTHOPAEDIC SURGERY

## 2019-09-24 PROCEDURE — 99999 PR PBB SHADOW E&M-EST. PATIENT-LVL III: ICD-10-PCS | Mod: PBBFAC,,, | Performed by: ORTHOPAEDIC SURGERY

## 2019-09-24 PROCEDURE — 99213 OFFICE O/P EST LOW 20 MIN: CPT | Mod: S$PBB,,, | Performed by: ORTHOPAEDIC SURGERY

## 2019-09-24 PROCEDURE — 99999 PR PBB SHADOW E&M-EST. PATIENT-LVL III: CPT | Mod: PBBFAC,,, | Performed by: ORTHOPAEDIC SURGERY

## 2019-09-24 RX ORDER — CELECOXIB 200 MG/1
200 CAPSULE ORAL 2 TIMES DAILY
Qty: 60 CAPSULE | Refills: 0 | Status: SHIPPED | OUTPATIENT
Start: 2019-09-24 | End: 2019-10-19 | Stop reason: SDUPTHER

## 2019-10-19 DIAGNOSIS — Z98.890 S/P ARTHROSCOPY OF RIGHT SHOULDER: ICD-10-CM

## 2019-10-20 RX ORDER — CELECOXIB 200 MG/1
CAPSULE ORAL
Qty: 60 CAPSULE | Refills: 0 | Status: SHIPPED | OUTPATIENT
Start: 2019-10-20 | End: 2019-11-14 | Stop reason: SDUPTHER

## 2019-11-14 DIAGNOSIS — Z98.890 S/P ARTHROSCOPY OF RIGHT SHOULDER: ICD-10-CM

## 2019-11-14 RX ORDER — CELECOXIB 200 MG/1
CAPSULE ORAL
Qty: 60 CAPSULE | Refills: 0 | Status: SHIPPED | OUTPATIENT
Start: 2019-11-14 | End: 2020-02-11

## 2019-11-21 ENCOUNTER — OFFICE VISIT (OUTPATIENT)
Dept: SPORTS MEDICINE | Facility: CLINIC | Age: 54
End: 2019-11-21
Payer: MEDICARE

## 2019-11-21 VITALS
BODY MASS INDEX: 28.09 KG/M2 | SYSTOLIC BLOOD PRESSURE: 115 MMHG | DIASTOLIC BLOOD PRESSURE: 80 MMHG | HEART RATE: 83 BPM | WEIGHT: 179 LBS | HEIGHT: 67 IN

## 2019-11-21 DIAGNOSIS — M25.611 STIFFNESS OF RIGHT SHOULDER JOINT: Primary | ICD-10-CM

## 2019-11-21 PROCEDURE — 99213 OFFICE O/P EST LOW 20 MIN: CPT | Mod: S$PBB,,, | Performed by: ORTHOPAEDIC SURGERY

## 2019-11-21 PROCEDURE — 99999 PR PBB SHADOW E&M-EST. PATIENT-LVL III: CPT | Mod: PBBFAC,,, | Performed by: ORTHOPAEDIC SURGERY

## 2019-11-21 PROCEDURE — 99999 PR PBB SHADOW E&M-EST. PATIENT-LVL III: ICD-10-PCS | Mod: PBBFAC,,, | Performed by: ORTHOPAEDIC SURGERY

## 2019-11-21 PROCEDURE — 99213 PR OFFICE/OUTPT VISIT, EST, LEVL III, 20-29 MIN: ICD-10-PCS | Mod: S$PBB,,, | Performed by: ORTHOPAEDIC SURGERY

## 2019-11-21 PROCEDURE — 99213 OFFICE O/P EST LOW 20 MIN: CPT | Mod: PBBFAC | Performed by: ORTHOPAEDIC SURGERY

## 2019-11-21 RX ORDER — BUPROPION HYDROCHLORIDE 150 MG/1
150 TABLET, EXTENDED RELEASE ORAL 2 TIMES DAILY
COMMUNITY
End: 2020-10-30

## 2019-11-21 NOTE — PROGRESS NOTES
CC: Follow up right shoulder     DATE OF PROCEDURE: 6/21/2019   OPERATION:   Right  1. Shoulder open subpectoral biceps tenodesis  2. Shoulder arthroscopic limited debridement    3. Shoulder arthroscopic subacromial decompression     Van Jones returns to clinic 5 mos s/p the above mentioned procedure. Continuing therapy at [facility] in [place] with good progress. Mild muscular soreness post therapy. States he back to his baseline activity. Has been in the gym lifting and fishing without pain or problems. SANE 100. Very pleased.     Pain Score: 0-No pain    PAST MEDICAL HISTORY:   Past Medical History:   Diagnosis Date    Broken femur     Deep vein thrombosis     Hyperlipidemia     Nerve damage left    Pulmonary embolism     Reflex sympathetic dystrophy 1983    Left leg after broken femur surgery       PAST SURGICAL HISTORY:  Past Surgical History:   Procedure Laterality Date    ARTHROSCOPY OF SHOULDER WITH DECOMPRESSION OF SUBACROMIAL SPACE Right 6/21/2019    Procedure: ARTHROSCOPY, SHOULDER, WITH SUBACROMIAL SPACE DECOMPRESSION;  Surgeon: TALON Brink MD;  Location: [facility] OR [address] [place] [address];  Service: Orthopedics;  Laterality: Right;    CERVICAL DISC ARTHROPLASTY  01/23/2018    C3/4    COLONOSCOPY N/A 2/25/2016    Procedure: COLONOSCOPY;  Surgeon: Daryl Viramontes MD;  Location: [facility] (4TH FLR);  Service: Endoscopy;  Laterality: N/A;  PM Prep      FEMUR SURGERY      HERNIA REPAIR      umbilical    SHOULDER ARTHROSCOPY Right 6/21/2019    Procedure: ARTHROSCOPY, SHOULDER, BICEPS TENODESIS;  Surgeon: TALON Brink MD;  Location: [facility] OR [address] [place] [address];  Service: Orthopedics;  Laterality: Right;  REGIONAL W/CATHETER INTERSCALENE  [employer] notified 6-19 LO  Broderick/arthrex notified 6-20 LO    SYNOVECTOMY OF SHOULDER Right 6/21/2019    Procedure: SYNOVECTOMY, SHOULDER;  Surgeon: TALON Brink MD;  Location: [facility] OR [address] [place] [address];  Service: Orthopedics;  Laterality: Right;    TONSILLECTOMY         FAMILY  HISTORY:  Family History   Problem Relation Age of Onset    Heart disease Mother 60    Neuropathy Mother     Heart disease Father 45       MEDICATIONS:    Current Outpatient Medications:     aspirin (ECOTRIN) 81 MG EC tablet, Take 81 mg by mouth once daily., Disp: , Rfl:     buPROPion (WELLBUTRIN SR) 150 MG TBSR 12 hr tablet, Take 150 mg by mouth 2 (two) times daily., Disp: , Rfl:     celecoxib (CELEBREX) 200 MG capsule, TAKE 1 CAPSULE BY MOUTH TWICE DAILY, Disp: 60 capsule, Rfl: 0    LORazepam (ATIVAN) 0.5 MG tablet, every evening. , Disp: , Rfl:     rosuvastatin (CRESTOR) 10 MG tablet, Take 10 mg by mouth every evening. , Disp: , Rfl:     sertraline (ZOLOFT) 50 MG tablet, every evening. , Disp: , Rfl:     azelastine (ASTELIN) 137 mcg (0.1 %) nasal spray, U 1 SPR IEN BID, Disp: , Rfl: 0    buPROPion (WELLBUTRIN XL) 300 MG 24 hr tablet, Take 300 mg by mouth once daily. , Disp: , Rfl:     HYDROcodone-acetaminophen (NORCO) 7.5-325 mg per tablet, Take 1 tablet by mouth every 6 (six) hours as needed for Pain., Disp: 25 tablet, Rfl: 0    HYDROcodone-acetaminophen (NORCO) 7.5-325 mg per tablet, Take 1 tablet by mouth every 12 (twelve) hours as needed for Pain., Disp: 20 tablet, Rfl: 0    naproxen (NAPROSYN) 500 MG tablet, Take 1 tablet (500 mg total) by mouth 2 (two) times daily., Disp: 60 tablet, Rfl: 1    oxyCODONE (ROXICODONE) 5 MG immediate release tablet, Take 1-2 tablets (5-10 mg total) by mouth every 6 (six) hours as needed for Pain., Disp: 28 tablet, Rfl: 0    ALLERGIES:  Review of patient's allergies indicates:   Allergen Reactions    Lamisil [terbinafine hcl] Rash     REVIEW OF SYSTEMS:  Constitution: Negative. Negative for chills, fever and night sweats.    Hematologic/Lymphatic: Negative for bleeding problem. Does not bruise/bleed easily.   Skin: Negative for dry skin, itching and rash.   Musculoskeletal: Negative for falls. Negative for right shoulder pain and muscle weakness.     All other  "review of symptoms were reviewed and found to be noncontributory.     PHYSICAL EXAMINATION:  Vitals:  /80   Pulse 83   Ht 5' 7" (1.702 m)   Wt 81.2 kg (179 lb)   BMI 28.04 kg/m²    General: Well-developed well-nourished 54 y.o. malein no acute distress   Cardiovascular: Regular rhythm by palpation of distal pulse, normal color and temperature, no concerning varicosities on symptomatic side   Lungs: No labored breathing or wheezing appreciated   Neuro: Alert and oriented ×3   Psychiatric: well oriented to person, place and time, demonstrates normal mood and affect   Skin: No rashes, lesions or ulcers, normal temperature, turgor, and texture on uninvolved extremity    Ortho/SPM Exam  Examination of the right shoulder demonstrates well healed incisions. No signs of infection. No tenderness over the biceps tenodesis site. Biceps appears well tensioned. Active FE in the scapular plane to 170. Passive ER to 60. Symmetric motion to his contralateral side. 5/5 resisted supraspinatus and infraspinatus testing. Negative miguel angel deformity. NVI distally.     IMAGING: No new images.     ASSESSMENT:      ICD-10-CM ICD-9-CM   1. Stiffness of right shoulder joint M25.611 719.51     PLAN:     Van has rehabbed the shoulder well. May d/c therapy now. No lifting restrictions. RTC as needed.     Procedures    "

## 2020-01-08 ENCOUNTER — PATIENT MESSAGE (OUTPATIENT)
Dept: PODIATRY | Facility: CLINIC | Age: 55
End: 2020-01-08

## 2020-01-08 ENCOUNTER — OFFICE VISIT (OUTPATIENT)
Dept: PODIATRY | Facility: CLINIC | Age: 55
End: 2020-01-08
Payer: MEDICARE

## 2020-01-08 VITALS
SYSTOLIC BLOOD PRESSURE: 126 MMHG | HEIGHT: 68 IN | BODY MASS INDEX: 27.13 KG/M2 | HEART RATE: 72 BPM | DIASTOLIC BLOOD PRESSURE: 82 MMHG | WEIGHT: 179 LBS

## 2020-01-08 DIAGNOSIS — G57.82 NERVE ENTRAPMENT OF LOWER LIMB, LEFT: Primary | ICD-10-CM

## 2020-01-08 DIAGNOSIS — L60.9 DISEASE OF NAIL: ICD-10-CM

## 2020-01-08 PROCEDURE — 99203 OFFICE O/P NEW LOW 30 MIN: CPT | Mod: S$PBB,,, | Performed by: PODIATRIST

## 2020-01-08 PROCEDURE — 99213 OFFICE O/P EST LOW 20 MIN: CPT | Mod: PBBFAC | Performed by: PODIATRIST

## 2020-01-08 PROCEDURE — 99999 PR PBB SHADOW E&M-EST. PATIENT-LVL III: ICD-10-PCS | Mod: PBBFAC,,, | Performed by: PODIATRIST

## 2020-01-08 PROCEDURE — 99999 PR PBB SHADOW E&M-EST. PATIENT-LVL III: CPT | Mod: PBBFAC,,, | Performed by: PODIATRIST

## 2020-01-08 PROCEDURE — 99203 PR OFFICE/OUTPT VISIT, NEW, LEVL III, 30-44 MIN: ICD-10-PCS | Mod: S$PBB,,, | Performed by: PODIATRIST

## 2020-01-08 RX ORDER — KETOCONAZOLE 20 MG/G
CREAM TOPICAL DAILY
Qty: 1 TUBE | Refills: 2 | Status: SHIPPED | OUTPATIENT
Start: 2020-01-08 | End: 2020-04-14 | Stop reason: SDUPTHER

## 2020-01-12 NOTE — PROGRESS NOTES
Subjective:      Patient ID: Van Jones is a 54 y.o. male.    Chief Complaint: Nail Problem    Van is a 54 y.o. male who presents to the clinic complaining of thick and discolored toenails on both feet. Van is inquiring about treatment options.      Review of Systems   Constitution: Negative for chills and fever.   HENT: Negative for congestion and tinnitus.    Eyes: Negative for double vision and visual disturbance.   Cardiovascular: Negative for chest pain and claudication.   Respiratory: Negative for hemoptysis and shortness of breath.    Endocrine: Negative for cold intolerance and heat intolerance.   Hematologic/Lymphatic: Negative for adenopathy and bleeding problem.   Skin: Positive for color change and nail changes.   Musculoskeletal: Negative for myalgias and stiffness.   Gastrointestinal: Negative for nausea and vomiting.   Genitourinary: Negative for dysuria and hematuria.   Neurological: Negative for numbness and paresthesias.   Psychiatric/Behavioral: Negative for altered mental status and suicidal ideas.   Allergic/Immunologic: Negative for environmental allergies and persistent infections.           Objective:      Physical Exam   Constitutional: He is oriented to person, place, and time. Vital signs are normal. He appears well-developed and well-nourished.   Cardiovascular:   Pulses:       Dorsalis pedis pulses are 2+ on the right side, and 2+ on the left side.        Posterior tibial pulses are 2+ on the right side, and 2+ on the left side.   Musculoskeletal:        Right foot: There is normal range of motion and no deformity.        Left foot: There is normal range of motion and no deformity.   Inspection and palpation of the muscles joints and bones of both lower extremities reveal that muscle strength for the anterior, lateral, and posterior muscle groups and intrinsic muscle groups of the foot are all 5 over 5 symmetrical. Ankle, subtalar, midtarsal, and digital joint range of  motion  are within normal limits, nonpainful, without crepitus or effusion. Patient exhibits a normal angle and base of gait. Palpation of the tendons reveal no defects.     Feet:   Right Foot:   Skin Integrity: Negative for skin breakdown or erythema.   Left Foot:   Skin Integrity: Negative for skin breakdown or erythema.   Neurological: He is oriented to person, place, and time. He has normal strength. No sensory deficit.   Reflex Scores:       Patellar reflexes are 2+ on the right side and 2+ on the left side.       Achilles reflexes are 2+ on the right side and 2+ on the left side.  Sharp, dull, light touch, vibratory, and proprioceptive sensation are intact bilaterally. Deep tendon reflexes to patellar and Achilles tendon are symmetrical, 2/4 bilaterally. No ankle clonus or Babinski reflexes noted bilaterally. Coordination is normal to both feet and lower extremities.   Skin: Skin is warm, dry and intact. No cyanosis. Nails show no clubbing.   Skin turgor is normal bilaterally. Skin texture is well hydrated to both lower extremities. No lesions or rashes or wounds appreciated bilaterally. The nail plates 1 through 5 bilaterally specifically the bilateral hallux nails are noted to be thickened discolored with mild incurvation and tenderness noted.             Assessment:       Encounter Diagnoses   Name Primary?    Nerve entrapment of lower limb, left Yes    Disease of nail          Plan:       Van was seen today for nail problem.    Diagnoses and all orders for this visit:    Nerve entrapment of lower limb, left    Disease of nail    Other orders  -     ketoconazole (NIZORAL) 2 % cream; Apply topically once daily.      I counseled the patient on his conditions, their implications and medical management.      He will begin topical antifungal medication.  Oral and topical options were discussed in detail.  Conservative and surgical options were discussed.  Follow-up in 6 months.  .

## 2020-01-15 ENCOUNTER — PROCEDURE VISIT (OUTPATIENT)
Dept: PODIATRY | Facility: CLINIC | Age: 55
End: 2020-01-15
Payer: MEDICARE

## 2020-01-15 VITALS
HEART RATE: 86 BPM | WEIGHT: 197.75 LBS | BODY MASS INDEX: 29.97 KG/M2 | DIASTOLIC BLOOD PRESSURE: 78 MMHG | SYSTOLIC BLOOD PRESSURE: 117 MMHG | HEIGHT: 68 IN

## 2020-01-15 DIAGNOSIS — G57.82 NERVE ENTRAPMENT OF LOWER LIMB, LEFT: Primary | ICD-10-CM

## 2020-01-15 DIAGNOSIS — M21.372 FOOT DROP, LEFT FOOT: ICD-10-CM

## 2020-01-15 PROCEDURE — 64450 PR NERVE BLOCK INJ, ANES/STEROID, OTHER PERIPHERAL: ICD-10-PCS | Mod: S$PBB,LT,, | Performed by: PODIATRIST

## 2020-01-15 PROCEDURE — 64450 NJX AA&/STRD OTHER PN/BRANCH: CPT | Mod: S$PBB,LT,, | Performed by: PODIATRIST

## 2020-01-15 PROCEDURE — 64450 NJX AA&/STRD OTHER PN/BRANCH: CPT | Mod: PBBFAC,LT | Performed by: PODIATRIST

## 2020-01-20 ENCOUNTER — PATIENT MESSAGE (OUTPATIENT)
Dept: SPORTS MEDICINE | Facility: CLINIC | Age: 55
End: 2020-01-20

## 2020-01-20 NOTE — PROCEDURES
The area overlying the left deep and superficial peroneal nerve, common peroneal nerve and saphenous nerve(s) was sterilely prepped, verbal consent was obtained, 2 cc's of 0.5% Marcaine plain was infiltrated around the affected nerve(s) for a diagnostic nerve block.  This was well tolerated with no complications.

## 2020-01-21 ENCOUNTER — TELEPHONE (OUTPATIENT)
Dept: SPORTS MEDICINE | Facility: CLINIC | Age: 55
End: 2020-01-21

## 2020-01-21 ENCOUNTER — PATIENT MESSAGE (OUTPATIENT)
Dept: PODIATRY | Facility: CLINIC | Age: 55
End: 2020-01-21
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
## 2020-02-11 ENCOUNTER — PATIENT MESSAGE (OUTPATIENT)
Dept: SPORTS MEDICINE | Facility: CLINIC | Age: 55
End: 2020-02-11

## 2020-02-11 DIAGNOSIS — M25.611 STIFFNESS OF RIGHT SHOULDER JOINT: Primary | ICD-10-CM

## 2020-02-11 DIAGNOSIS — Z98.890 S/P ARTHROSCOPY OF RIGHT SHOULDER: ICD-10-CM

## 2020-02-11 RX ORDER — CELECOXIB 200 MG/1
200 CAPSULE ORAL DAILY
Qty: 60 CAPSULE | Refills: 0 | Status: SHIPPED | OUTPATIENT
Start: 2020-02-11 | End: 2020-04-11

## 2020-02-12 ENCOUNTER — OFFICE VISIT (OUTPATIENT)
Dept: PODIATRY | Facility: CLINIC | Age: 55
End: 2020-02-12
Payer: MEDICARE

## 2020-02-12 VITALS
RESPIRATION RATE: 18 BRPM | WEIGHT: 197 LBS | HEIGHT: 67 IN | DIASTOLIC BLOOD PRESSURE: 76 MMHG | BODY MASS INDEX: 30.92 KG/M2 | HEART RATE: 78 BPM | SYSTOLIC BLOOD PRESSURE: 106 MMHG

## 2020-02-12 DIAGNOSIS — G57.82 NERVE ENTRAPMENT OF LOWER LIMB, LEFT: Primary | ICD-10-CM

## 2020-02-12 DIAGNOSIS — M21.372 FOOT DROP, LEFT FOOT: ICD-10-CM

## 2020-02-12 PROCEDURE — 99213 OFFICE O/P EST LOW 20 MIN: CPT | Mod: PBBFAC | Performed by: PODIATRIST

## 2020-02-12 PROCEDURE — 99214 PR OFFICE/OUTPT VISIT, EST, LEVL IV, 30-39 MIN: ICD-10-PCS | Mod: S$PBB,,, | Performed by: PODIATRIST

## 2020-02-12 PROCEDURE — 99999 PR PBB SHADOW E&M-EST. PATIENT-LVL III: CPT | Mod: PBBFAC,,, | Performed by: PODIATRIST

## 2020-02-12 PROCEDURE — 99214 OFFICE O/P EST MOD 30 MIN: CPT | Mod: S$PBB,,, | Performed by: PODIATRIST

## 2020-02-12 PROCEDURE — 99999 PR PBB SHADOW E&M-EST. PATIENT-LVL III: ICD-10-PCS | Mod: PBBFAC,,, | Performed by: PODIATRIST

## 2020-02-12 RX ORDER — BUPROPION HYDROCHLORIDE 150 MG/1
TABLET ORAL
COMMUNITY
Start: 2019-11-08 | End: 2022-04-25

## 2020-02-12 RX ORDER — BUPROPION HYDROCHLORIDE 150 MG/1
TABLET ORAL
COMMUNITY
Start: 2019-08-27 | End: 2020-07-21

## 2020-02-17 NOTE — PROGRESS NOTES
Subjective:      Patient ID: Van Jones is a 54 y.o. male.    Chief Complaint: Follow-up (after left foot procedure   ); Post-op Evaluation; and Foot Pain    Van is a 54 y.o. male who presents to the clinic complaining of thick and discolored toenails on both feet. Van is inquiring about treatment options.    He did very well with the nerve injections for nerve blocks with relief approximately 3 to have days.  He is here to discuss possible surgical intervention.      Review of Systems   Constitution: Negative for chills and fever.   HENT: Negative for congestion and tinnitus.    Eyes: Negative for double vision and visual disturbance.   Cardiovascular: Negative for chest pain and claudication.   Respiratory: Negative for hemoptysis and shortness of breath.    Endocrine: Negative for cold intolerance and heat intolerance.   Hematologic/Lymphatic: Negative for adenopathy and bleeding problem.   Skin: Positive for color change and nail changes.   Musculoskeletal: Negative for myalgias and stiffness.   Gastrointestinal: Negative for nausea and vomiting.   Genitourinary: Negative for dysuria and hematuria.   Neurological: Negative for numbness and paresthesias.   Psychiatric/Behavioral: Negative for altered mental status and suicidal ideas.   Allergic/Immunologic: Negative for environmental allergies and persistent infections.           Objective:      Physical Exam   Constitutional: He is oriented to person, place, and time. Vital signs are normal. He appears well-developed and well-nourished.   Cardiovascular:   Pulses:       Dorsalis pedis pulses are 2+ on the right side, and 2+ on the left side.        Posterior tibial pulses are 2+ on the right side, and 2+ on the left side.   Musculoskeletal:        Right foot: There is normal range of motion and no deformity.        Left foot: There is normal range of motion and no deformity.   Inspection and palpation of the muscles joints and bones of both lower  extremities reveal that muscle strength for the anterior, lateral, and posterior muscle groups and intrinsic muscle groups of the foot are all 5 over 5 symmetrical. Ankle, subtalar, midtarsal, and digital joint range of motion  are within normal limits, nonpainful, without crepitus or effusion. Patient exhibits a normal angle and base of gait. Palpation of the tendons reveal no defects.     Feet:   Right Foot:   Skin Integrity: Negative for skin breakdown or erythema.   Left Foot:   Skin Integrity: Negative for skin breakdown or erythema.   Neurological: He is oriented to person, place, and time. He has normal strength. No sensory deficit.   Reflex Scores:       Patellar reflexes are 2+ on the right side and 2+ on the left side.       Achilles reflexes are 2+ on the right side and 2+ on the left side.  Sharp, dull, light touch, vibratory, and proprioceptive sensation are intact bilaterally. Deep tendon reflexes to patellar and Achilles tendon are symmetrical, 2/4 bilaterally. No ankle clonus or Babinski reflexes noted bilaterally. Coordination is normal to both feet and lower extremities.   Skin: Skin is warm, dry and intact. No cyanosis. Nails show no clubbing.   Skin turgor is normal bilaterally. Skin texture is well hydrated to both lower extremities. No lesions or rashes or wounds appreciated bilaterally. The nail plates 1 through 5 bilaterally specifically the bilateral hallux nails are noted to be thickened discolored with mild incurvation and tenderness noted.             Assessment:       Encounter Diagnoses   Name Primary?    Nerve entrapment of lower limb, left Yes    Foot drop, left foot          Plan:       Van was seen today for follow-up, post-op evaluation and foot pain.    Diagnoses and all orders for this visit:    Nerve entrapment of lower limb, left    Foot drop, left foot      I counseled the patient on his conditions, their implications and medical management.    Ongoing monitoring,  evaluating, assessing, and treatment options are recognized and reviewed in detail with the patient.  Signs, symptoms and disease/condition progression consist of weakness, burning tingling and hypersensitivity to the left lower extremity as well as footdrop..  Evaluation of patient's condition consist of above mentioned test results, effectiveness of medications and other treatments administered and level of response to treatment by the patient has been very well tolerated with good results..  Assessing and addressing patient's condition include above-mentioned ancillary testing, discussion of information in detail with patient, record review and counseling regarding the patient's pathology.  Ongoing treatment of the patient's condition have included various medications, therapies and other modalities consisting of the use of a crutch, shoe gear and over-the-counter inserts.    Conservative and surgical options discussed in detail.  Follow-up to schedule nerve decompression procedure.

## 2020-02-27 ENCOUNTER — TELEPHONE (OUTPATIENT)
Dept: PODIATRY | Facility: CLINIC | Age: 55
End: 2020-02-27

## 2020-02-27 NOTE — TELEPHONE ENCOUNTER
Nurse called pt to schedule surgery with Dr. Flor. Next available is 4/10/20. Pt arrival time is 5am. Pt accepts. Pt informed that he would need a medical clearance dated on or after 3/10/20 because the clearance is only good for 30 day. Addition information is mailed out pt will call if any other questions or concerns should arrive.

## 2020-02-27 NOTE — TELEPHONE ENCOUNTER
Nurse called pt to inform him that his surgery date is not 4/10 because that is a holiday pt surgery moved to April 3rd per his request.

## 2020-03-10 ENCOUNTER — TELEPHONE (OUTPATIENT)
Dept: PODIATRY | Facility: CLINIC | Age: 55
End: 2020-03-10

## 2020-03-10 NOTE — TELEPHONE ENCOUNTER
Nurse called pt regarding surgery questions. No answer and voicemail not set up. Pt packet is is the mail .      ----- Message from Elsa Beauchamp sent at 3/10/2020  8:04 AM CDT -----  Contact: pt   Please call pt at [phone]     Patient has questions regarding future surgery    Patient is waiting for the surgery packet to arrive by mail    Thank you

## 2020-03-22 ENCOUNTER — PATIENT MESSAGE (OUTPATIENT)
Dept: SURGERY | Facility: HOSPITAL | Age: 55
End: 2020-03-22

## 2020-04-14 RX ORDER — KETOCONAZOLE 20 MG/G
CREAM TOPICAL DAILY
Qty: 1 TUBE | Refills: 2 | Status: SHIPPED | OUTPATIENT
Start: 2020-04-14 | End: 2020-10-30

## 2020-04-16 ENCOUNTER — TELEPHONE (OUTPATIENT)
Dept: PODIATRY | Facility: CLINIC | Age: 55
End: 2020-04-16

## 2020-04-16 NOTE — TELEPHONE ENCOUNTER
Nurse called pt and informed him that his surgery is on hold until after the covid restrictions are lifted

## 2020-05-19 ENCOUNTER — TELEPHONE (OUTPATIENT)
Dept: PODIATRY | Facility: CLINIC | Age: 55
End: 2020-05-19

## 2020-05-19 NOTE — TELEPHONE ENCOUNTER
Nurse called pt to rescheduled surgery. Pt states that he will wait until Covid calms down because his aunt was just in the hospital for a surgery and caught the virus. Pt advised to call back when he is ready

## 2020-07-13 RX ORDER — KETOCONAZOLE 20 MG/G
CREAM TOPICAL DAILY
Qty: 1 TUBE | Refills: 2 | OUTPATIENT
Start: 2020-07-13

## 2020-07-13 NOTE — TELEPHONE ENCOUNTER
"Nurse called pt and informed pt that he needs to schedule an appointment. Pt states "Ok I will look at my schedule and schedule an appointment.  "

## 2020-07-21 ENCOUNTER — OFFICE VISIT (OUTPATIENT)
Dept: DERMATOLOGY | Facility: CLINIC | Age: 55
End: 2020-07-21
Payer: MEDICARE

## 2020-07-21 DIAGNOSIS — L28.0 LSC (LICHEN SIMPLEX CHRONICUS): Primary | ICD-10-CM

## 2020-07-21 DIAGNOSIS — L24.9 IRRITANT CONTACT DERMATITIS, UNSPECIFIED TRIGGER: ICD-10-CM

## 2020-07-21 PROCEDURE — 11900 PR INJECTION INTO SKIN LESIONS, UP TO 7: ICD-10-PCS | Mod: S$PBB,,, | Performed by: DERMATOLOGY

## 2020-07-21 PROCEDURE — 99999 PR PBB SHADOW E&M-EST. PATIENT-LVL II: ICD-10-PCS | Mod: PBBFAC,,, | Performed by: DERMATOLOGY

## 2020-07-21 PROCEDURE — 99202 OFFICE O/P NEW SF 15 MIN: CPT | Mod: 25,S$PBB,, | Performed by: DERMATOLOGY

## 2020-07-21 PROCEDURE — 99999 PR PBB SHADOW E&M-EST. PATIENT-LVL II: CPT | Mod: PBBFAC,,, | Performed by: DERMATOLOGY

## 2020-07-21 PROCEDURE — 11900 INJECT SKIN LESIONS </W 7: CPT | Mod: PBBFAC | Performed by: DERMATOLOGY

## 2020-07-21 PROCEDURE — 99202 PR OFFICE/OUTPT VISIT, NEW, LEVL II, 15-29 MIN: ICD-10-PCS | Mod: 25,S$PBB,, | Performed by: DERMATOLOGY

## 2020-07-21 PROCEDURE — 11900 INJECT SKIN LESIONS </W 7: CPT | Mod: S$PBB,,, | Performed by: DERMATOLOGY

## 2020-07-21 PROCEDURE — 99212 OFFICE O/P EST SF 10 MIN: CPT | Mod: PBBFAC,25 | Performed by: DERMATOLOGY

## 2020-07-21 RX ORDER — TRIAMCINOLONE ACETONIDE 1 MG/G
OINTMENT TOPICAL
Qty: 80 G | Refills: 0 | Status: SHIPPED | OUTPATIENT
Start: 2020-07-21 | End: 2020-10-30

## 2020-07-21 NOTE — PATIENT INSTRUCTIONS
Discussed good skin care regimen including using a mild soap and moisturizing cream 1 - 2 times per day. Limit to one bath/shower per day and use lukewarm (not hot) water.     Discussed with patient the importance of not manipulating skin lesions. Trauma often exacerbates condition. Trimming nails is recommended to avoid puncturing skin.     Use ice to relieve intense pruritus.

## 2020-07-21 NOTE — PROGRESS NOTES
Subjective:       Patient ID:  Van Jones is a 55 y.o. male who presents for   Chief Complaint   Patient presents with    Lesion     left upper arm     Patient complains of lesion(s)  Location: left upper arm  Duration: years  Symptoms: bled after trauma recently. Occ. itchy  Relieving factors/Previous treatments: none    No h/o nmsc          Review of Systems   Skin: Positive for activity-related sunscreen use. Negative for daily sunscreen use and wears hat.   Hematologic/Lymphatic: Does not bruise/bleed easily.        Objective:    Physical Exam   Constitutional: He appears well-developed and well-nourished. No distress.   Neurological: He is alert and oriented to person, place, and time. He is not disoriented.   Psychiatric: He has a normal mood and affect.   Skin:   Areas Examined (abnormalities noted in diagram):   LUE Inspection Performed              Diagram Legend     Erythematous scaling macule/papule c/w actinic keratosis       Vascular papule c/w angioma      Pigmented verrucoid papule/plaque c/w seborrheic keratosis      Yellow umbilicated papule c/w sebaceous hyperplasia      Irregularly shaped tan macule c/w lentigo     1-2 mm smooth white papules consistent with Milia      Movable subcutaneous cyst with punctum c/w epidermal inclusion cyst      Subcutaneous movable cyst c/w pilar cyst      Firm pink to brown papule c/w dermatofibroma      Pedunculated fleshy papule(s) c/w skin tag(s)      Evenly pigmented macule c/w junctional nevus     Mildly variegated pigmented, slightly irregular-bordered macule c/w mildly atypical nevus      Flesh colored to evenly pigmented papule c/w intradermal nevus       Pink pearly papule/plaque c/w basal cell carcinoma      Erythematous hyperkeratotic cursted plaque c/w SCC      Surgical scar with no sign of skin cancer recurrence      Open and closed comedones      Inflammatory papules and pustules      Verrucoid papule consistent consistent with wart      Erythematous eczematous patches and plaques     Dystrophic onycholytic nail with subungual debris c/w onychomycosis     Umbilicated papule    Erythematous-base heme-crusted tan verrucoid plaque consistent with inflamed seborrheic keratosis     Erythematous Silvery Scaling Plaque c/w Psoriasis     See annotation      Assessment / Plan:        LSC (lichen simplex chronicus) with Irritant contact dermatitis, unspecified trigger    -     triamcinolone acetonide injection 10 mg  Intralesional Kenalog 10mg/cc (0.5 cc total) injected into 1 lesions on the left arm today after obtaining verbal consent including risk of surrounding hypopigmentation. Patient tolerated procedure well.    Units: 1  NDC for Kenalog 10mg/cc:  7707-8753-97  -     triamcinolone acetonide 0.1% (KENALOG) 0.1 % ointment; AAA left arm bid  Dispense: 80 g; Refill: 0    Discussed good skin care regimen including using a mild soap and moisturizing cream 1 - 2 times per day. Limit to one bath/shower per day and use lukewarm (not hot) water.     Discussed with patient the importance of not manipulating skin lesions. Trauma often exacerbates condition. Trimming nails is recommended to avoid puncturing skin.     Use ice to relieve intense pruritus.                 No follow-ups on file.

## 2020-07-29 ENCOUNTER — OFFICE VISIT (OUTPATIENT)
Dept: RHEUMATOLOGY | Facility: CLINIC | Age: 55
End: 2020-07-29
Payer: MEDICARE

## 2020-07-29 ENCOUNTER — LAB VISIT (OUTPATIENT)
Dept: LAB | Facility: HOSPITAL | Age: 55
End: 2020-07-29
Attending: INTERNAL MEDICINE
Payer: MEDICARE

## 2020-07-29 VITALS
DIASTOLIC BLOOD PRESSURE: 71 MMHG | SYSTOLIC BLOOD PRESSURE: 105 MMHG | BODY MASS INDEX: 32.08 KG/M2 | HEIGHT: 67 IN | HEART RATE: 90 BPM | WEIGHT: 204.38 LBS | TEMPERATURE: 98 F

## 2020-07-29 DIAGNOSIS — M35.00 SJOGREN'S SYNDROME WITHOUT EXTRAGLANDULAR INVOLVEMENT: Primary | ICD-10-CM

## 2020-07-29 DIAGNOSIS — M35.00 SJOGREN'S SYNDROME WITHOUT EXTRAGLANDULAR INVOLVEMENT: ICD-10-CM

## 2020-07-29 LAB
BACTERIA #/AREA URNS AUTO: NORMAL /HPF
BILIRUB UR QL STRIP: NEGATIVE
CLARITY UR REFRACT.AUTO: ABNORMAL
COLOR UR AUTO: YELLOW
GLUCOSE UR QL STRIP: ABNORMAL
HGB UR QL STRIP: NEGATIVE
KETONES UR QL STRIP: NEGATIVE
LEUKOCYTE ESTERASE UR QL STRIP: NEGATIVE
MICROSCOPIC COMMENT: NORMAL
NITRITE UR QL STRIP: NEGATIVE
PH UR STRIP: 5 [PH] (ref 5–8)
PROT UR QL STRIP: NEGATIVE
RBC #/AREA URNS AUTO: 0 /HPF (ref 0–4)
SP GR UR STRIP: 1.03 (ref 1–1.03)
SQUAMOUS #/AREA URNS AUTO: 0 /HPF
URN SPEC COLLECT METH UR: ABNORMAL
WBC #/AREA URNS AUTO: 0 /HPF (ref 0–5)

## 2020-07-29 PROCEDURE — 84156 ASSAY OF PROTEIN URINE: CPT

## 2020-07-29 PROCEDURE — 99213 OFFICE O/P EST LOW 20 MIN: CPT | Mod: PBBFAC | Performed by: INTERNAL MEDICINE

## 2020-07-29 PROCEDURE — 99214 OFFICE O/P EST MOD 30 MIN: CPT | Mod: S$PBB,,, | Performed by: INTERNAL MEDICINE

## 2020-07-29 PROCEDURE — 99999 PR PBB SHADOW E&M-EST. PATIENT-LVL III: ICD-10-PCS | Mod: PBBFAC,,, | Performed by: INTERNAL MEDICINE

## 2020-07-29 PROCEDURE — 81001 URINALYSIS AUTO W/SCOPE: CPT

## 2020-07-29 PROCEDURE — 99999 PR PBB SHADOW E&M-EST. PATIENT-LVL III: CPT | Mod: PBBFAC,,, | Performed by: INTERNAL MEDICINE

## 2020-07-29 PROCEDURE — 99214 PR OFFICE/OUTPT VISIT, EST, LEVL IV, 30-39 MIN: ICD-10-PCS | Mod: S$PBB,,, | Performed by: INTERNAL MEDICINE

## 2020-07-29 RX ORDER — HYDROXYCHLOROQUINE SULFATE 200 MG/1
200 TABLET, FILM COATED ORAL 2 TIMES DAILY
Qty: 60 TABLET | Refills: 5 | Status: SHIPPED | OUTPATIENT
Start: 2020-07-29 | End: 2020-08-28

## 2020-07-29 NOTE — PROGRESS NOTES
Rapid3 Question Responses and Scores 7/27/2020   MDHAQ Score 0.4   Psychologic Score 3.3   Pain Score 8   When you awakened in the morning OVER THE LAST WEEK, did you feel stiff? Yes   If Yes, please indicate the number of hours until you are as limber as you will be for the day 2   Fatigue Score 8   Global Health Score 7   RAPID3 Score 5.44         Answers for HPI/ROS submitted by the patient on 7/27/2020   fever: No  eye redness: No  headaches: No  shortness of breath: No  chest pain: No  trouble swallowing: No  diarrhea: No  constipation: No  unexpected weight change: No  genital sore: No  During the last 3 days, have you had a skin rash?: No  Bruises or bleeds easily: No  cough: No

## 2020-07-29 NOTE — LETTER
July 30, 2020      Tani Fermin MD  [address] [facility]  [facility] [address]           [facility] - Rheumatology  [address]  [place] [address]  Phone: [phone]  Fax: [phone]          Patient: Van Jones   MR Number: [id number]   YOB: 1965   Date of Visit: 7/29/2020       Dear Dr. Tani Fermin:    Thank you for referring Van Jones to me for evaluation. Attached you will find relevant portions of my assessment and plan of care.    If you have questions, please do not hesitate to call me. I look forward to following Van Jones along with you.    Sincerely,    Garrett Go MD    Enclosure  CC:  No Recipients    If you would like to receive this communication electronically, please contact [email or web address] or [phone] to request more information on [employer] Link access.    For providers and/or their staff who would like to refer a patient to [facility], please contact us through our one-stop-shop provider referral line, [facility], at [phone].    If you feel you have received this communication in error or would no longer like to receive these types of communications, please e-mail [email or web address]
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
## 2020-07-30 LAB
CREAT UR-MCNC: 203 MG/DL (ref 23–375)
PROT UR-MCNC: 11 MG/DL (ref 0–15)
PROT/CREAT UR: 0.05 MG/G{CREAT} (ref 0–0.2)

## 2020-09-03 ENCOUNTER — OFFICE VISIT (OUTPATIENT)
Dept: DERMATOLOGY | Facility: CLINIC | Age: 55
End: 2020-09-03
Payer: MEDICARE

## 2020-09-03 DIAGNOSIS — L24.9 IRRITANT CONTACT DERMATITIS, UNSPECIFIED TRIGGER: ICD-10-CM

## 2020-09-03 DIAGNOSIS — L28.0 LSC (LICHEN SIMPLEX CHRONICUS): Primary | ICD-10-CM

## 2020-09-03 PROCEDURE — 99213 OFFICE O/P EST LOW 20 MIN: CPT | Mod: PBBFAC | Performed by: DERMATOLOGY

## 2020-09-03 PROCEDURE — 99999 PR PBB SHADOW E&M-EST. PATIENT-LVL III: ICD-10-PCS | Mod: PBBFAC,,, | Performed by: DERMATOLOGY

## 2020-09-03 PROCEDURE — 99213 PR OFFICE/OUTPT VISIT, EST, LEVL III, 20-29 MIN: ICD-10-PCS | Mod: S$PBB,,, | Performed by: DERMATOLOGY

## 2020-09-03 PROCEDURE — 99999 PR PBB SHADOW E&M-EST. PATIENT-LVL III: CPT | Mod: PBBFAC,,, | Performed by: DERMATOLOGY

## 2020-09-03 PROCEDURE — 99213 OFFICE O/P EST LOW 20 MIN: CPT | Mod: S$PBB,,, | Performed by: DERMATOLOGY

## 2020-09-03 NOTE — PROGRESS NOTES
Subjective:       Patient ID:  Van Jones is a 55 y.o. male who presents for   Chief Complaint   Patient presents with    Lesion     left upper arm      Lesion - Follow-up  Diagnosis: LSC (x years) with surrounding ICD.  Symptom course: improving  Currently using: last seen 7/21 for IL K and TAC oint bid.  Affected locations: left arm  Signs / symptoms: asymptomatic (no longer itchy)        Review of Systems   Skin: Negative for itching and rash.        Objective:    Physical Exam   Constitutional: He appears well-developed and well-nourished. No distress.   Neurological: He is alert and oriented to person, place, and time. He is not disoriented.   Psychiatric: He has a normal mood and affect.   Skin:   Areas Examined (abnormalities noted in diagram):   LUE Inspection Performed              Diagram Legend     Erythematous scaling macule/papule c/w actinic keratosis       Vascular papule c/w angioma      Pigmented verrucoid papule/plaque c/w seborrheic keratosis      Yellow umbilicated papule c/w sebaceous hyperplasia      Irregularly shaped tan macule c/w lentigo     1-2 mm smooth white papules consistent with Milia      Movable subcutaneous cyst with punctum c/w epidermal inclusion cyst      Subcutaneous movable cyst c/w pilar cyst      Firm pink to brown papule c/w dermatofibroma      Pedunculated fleshy papule(s) c/w skin tag(s)      Evenly pigmented macule c/w junctional nevus     Mildly variegated pigmented, slightly irregular-bordered macule c/w mildly atypical nevus      Flesh colored to evenly pigmented papule c/w intradermal nevus       Pink pearly papule/plaque c/w basal cell carcinoma      Erythematous hyperkeratotic cursted plaque c/w SCC      Surgical scar with no sign of skin cancer recurrence      Open and closed comedones      Inflammatory papules and pustules      Verrucoid papule consistent consistent with wart     Erythematous eczematous patches and plaques     Dystrophic onycholytic  nail with subungual debris c/w onychomycosis     Umbilicated papule    Erythematous-base heme-crusted tan verrucoid plaque consistent with inflamed seborrheic keratosis     Erythematous Silvery Scaling Plaque c/w Psoriasis     See annotation      Assessment / Plan:        LSC (lichen simplex chronicus) - mostly resolved  No longer itchy  F/u if recurs for IL K    Irritant contact dermatitis, unspecified trigger - resolved  D/c TAC oint             No follow-ups on file.

## 2020-11-02 ENCOUNTER — OFFICE VISIT (OUTPATIENT)
Dept: RHEUMATOLOGY | Facility: CLINIC | Age: 55
End: 2020-11-02
Payer: MEDICARE

## 2020-11-02 DIAGNOSIS — M25.542 ARTHRALGIA OF BOTH HANDS: ICD-10-CM

## 2020-11-02 DIAGNOSIS — M25.541 ARTHRALGIA OF BOTH HANDS: ICD-10-CM

## 2020-11-02 DIAGNOSIS — M35.00 SJOGREN'S SYNDROME WITHOUT EXTRAGLANDULAR INVOLVEMENT: Primary | ICD-10-CM

## 2020-11-02 PROCEDURE — 99214 OFFICE O/P EST MOD 30 MIN: CPT | Mod: 95,,, | Performed by: INTERNAL MEDICINE

## 2020-11-02 PROCEDURE — 99214 PR OFFICE/OUTPT VISIT, EST, LEVL IV, 30-39 MIN: ICD-10-PCS | Mod: 95,,, | Performed by: INTERNAL MEDICINE

## 2020-11-02 RX ORDER — HYDROXYCHLOROQUINE SULFATE 200 MG/1
200 TABLET, FILM COATED ORAL 2 TIMES DAILY
Qty: 60 TABLET | Refills: 5 | Status: SHIPPED | OUTPATIENT
Start: 2020-11-02 | End: 2021-05-09
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
## 2020-11-02 NOTE — PROGRESS NOTES
Rapid3 Question Responses and Scores 11/2/2020   MDHAQ Score 0.9   Psychologic Score 3.3   Pain Score 8   When you awakened in the morning OVER THE LAST WEEK, did you feel stiff? Yes   If Yes, please indicate the number of hours until you are as limber as you will be for the day 6   Fatigue Score 7   Global Health Score 5   RAPID3 Score 5.33     Answers for HPI/ROS submitted by the patient on 11/2/2020   fever: No  eye redness: No  mouth sores: No  headaches: No  shortness of breath: No  chest pain: No  trouble swallowing: No  diarrhea: No  constipation: No  unexpected weight change: No  genital sore: No  During the last 3 days, have you had a skin rash?: No  Bruises or bleeds easily: No  cough: No

## 2021-02-23 ENCOUNTER — PATIENT MESSAGE (OUTPATIENT)
Dept: RHEUMATOLOGY | Facility: CLINIC | Age: 56
End: 2021-02-23

## 2021-08-27 ENCOUNTER — PATIENT MESSAGE (OUTPATIENT)
Dept: RHEUMATOLOGY | Facility: CLINIC | Age: 56
End: 2021-08-27

## 2021-09-10 ENCOUNTER — LAB VISIT (OUTPATIENT)
Dept: LAB | Facility: HOSPITAL | Age: 56
End: 2021-09-10
Attending: INTERNAL MEDICINE
Payer: MEDICARE

## 2021-09-10 DIAGNOSIS — M25.542 ARTHRALGIA OF BOTH HANDS: ICD-10-CM

## 2021-09-10 DIAGNOSIS — M25.541 ARTHRALGIA OF BOTH HANDS: ICD-10-CM

## 2021-09-10 DIAGNOSIS — M35.00 SJOGREN'S SYNDROME WITHOUT EXTRAGLANDULAR INVOLVEMENT: ICD-10-CM

## 2021-09-10 LAB
ALBUMIN SERPL BCP-MCNC: 4.4 G/DL (ref 3.5–5.2)
ALP SERPL-CCNC: 46 U/L (ref 55–135)
ALT SERPL W/O P-5'-P-CCNC: 24 U/L (ref 10–44)
ANION GAP SERPL CALC-SCNC: 10 MMOL/L (ref 8–16)
AST SERPL-CCNC: 23 U/L (ref 10–40)
BASOPHILS # BLD AUTO: 0.06 K/UL (ref 0–0.2)
BASOPHILS NFR BLD: 1 % (ref 0–1.9)
BILIRUB SERPL-MCNC: 0.7 MG/DL (ref 0.1–1)
BUN SERPL-MCNC: 12 MG/DL (ref 6–20)
C3 SERPL-MCNC: 150 MG/DL (ref 50–180)
C4 SERPL-MCNC: 34 MG/DL (ref 11–44)
CALCIUM SERPL-MCNC: 9.9 MG/DL (ref 8.7–10.5)
CHLORIDE SERPL-SCNC: 106 MMOL/L (ref 95–110)
CO2 SERPL-SCNC: 26 MMOL/L (ref 23–29)
CREAT SERPL-MCNC: 0.9 MG/DL (ref 0.5–1.4)
CRP SERPL-MCNC: 0.7 MG/L (ref 0–8.2)
DIFFERENTIAL METHOD: NORMAL
EOSINOPHIL # BLD AUTO: 0.1 K/UL (ref 0–0.5)
EOSINOPHIL NFR BLD: 2 % (ref 0–8)
ERYTHROCYTE [DISTWIDTH] IN BLOOD BY AUTOMATED COUNT: 12.8 % (ref 11.5–14.5)
ERYTHROCYTE [SEDIMENTATION RATE] IN BLOOD BY WESTERGREN METHOD: 8 MM/HR (ref 0–10)
EST. GFR  (AFRICAN AMERICAN): >60 ML/MIN/1.73 M^2
EST. GFR  (NON AFRICAN AMERICAN): >60 ML/MIN/1.73 M^2
GLUCOSE SERPL-MCNC: 90 MG/DL (ref 70–110)
HCT VFR BLD AUTO: 44.6 % (ref 40–54)
HGB BLD-MCNC: 14.3 G/DL (ref 14–18)
IMM GRANULOCYTES # BLD AUTO: 0.02 K/UL (ref 0–0.04)
IMM GRANULOCYTES NFR BLD AUTO: 0.3 % (ref 0–0.5)
LYMPHOCYTES # BLD AUTO: 1.4 K/UL (ref 1–4.8)
LYMPHOCYTES NFR BLD: 23.5 % (ref 18–48)
MCH RBC QN AUTO: 29.5 PG (ref 27–31)
MCHC RBC AUTO-ENTMCNC: 32.1 G/DL (ref 32–36)
MCV RBC AUTO: 92 FL (ref 82–98)
MONOCYTES # BLD AUTO: 0.5 K/UL (ref 0.3–1)
MONOCYTES NFR BLD: 7.6 % (ref 4–15)
NEUTROPHILS # BLD AUTO: 3.9 K/UL (ref 1.8–7.7)
NEUTROPHILS NFR BLD: 65.6 % (ref 38–73)
NRBC BLD-RTO: 0 /100 WBC
PLATELET # BLD AUTO: 258 K/UL (ref 150–450)
PMV BLD AUTO: 10.8 FL (ref 9.2–12.9)
POTASSIUM SERPL-SCNC: 3.9 MMOL/L (ref 3.5–5.1)
PROT SERPL-MCNC: 7.3 G/DL (ref 6–8.4)
RBC # BLD AUTO: 4.85 M/UL (ref 4.6–6.2)
SODIUM SERPL-SCNC: 142 MMOL/L (ref 136–145)
WBC # BLD AUTO: 5.92 K/UL (ref 3.9–12.7)

## 2021-09-10 PROCEDURE — 86140 C-REACTIVE PROTEIN: CPT | Performed by: INTERNAL MEDICINE

## 2021-09-10 PROCEDURE — 86160 COMPLEMENT ANTIGEN: CPT | Performed by: INTERNAL MEDICINE

## 2021-09-10 PROCEDURE — 85651 RBC SED RATE NONAUTOMATED: CPT | Mod: PO | Performed by: INTERNAL MEDICINE

## 2021-09-10 PROCEDURE — 85025 COMPLETE CBC W/AUTO DIFF WBC: CPT | Performed by: INTERNAL MEDICINE

## 2021-09-10 PROCEDURE — 82595 ASSAY OF CRYOGLOBULIN: CPT | Performed by: INTERNAL MEDICINE

## 2021-09-10 PROCEDURE — 80053 COMPREHEN METABOLIC PANEL: CPT | Performed by: INTERNAL MEDICINE

## 2021-09-10 PROCEDURE — 86160 COMPLEMENT ANTIGEN: CPT | Mod: 59 | Performed by: INTERNAL MEDICINE

## 2021-09-10 PROCEDURE — 36415 COLL VENOUS BLD VENIPUNCTURE: CPT | Mod: PO | Performed by: INTERNAL MEDICINE

## 2021-09-21 LAB — CRYOGLOB SER QL: NORMAL

## 2021-09-24 ENCOUNTER — HOSPITAL ENCOUNTER (OUTPATIENT)
Dept: RADIOLOGY | Facility: HOSPITAL | Age: 56
Discharge: HOME | End: 2021-09-24
Attending: INTERNAL MEDICINE
Payer: MEDICARE

## 2021-09-24 ENCOUNTER — IMMUNIZATION (OUTPATIENT)
Dept: FAMILY MEDICINE | Facility: CLINIC | Age: 56
End: 2021-09-24
Payer: MEDICARE

## 2021-09-24 ENCOUNTER — OFFICE VISIT (OUTPATIENT)
Dept: RHEUMATOLOGY | Facility: CLINIC | Age: 56
End: 2021-09-24
Payer: MEDICARE

## 2021-09-24 VITALS
WEIGHT: 203.94 LBS | SYSTOLIC BLOOD PRESSURE: 141 MMHG | BODY MASS INDEX: 31.94 KG/M2 | DIASTOLIC BLOOD PRESSURE: 82 MMHG | HEART RATE: 83 BPM

## 2021-09-24 DIAGNOSIS — M25.541 ARTHRALGIA OF BOTH HANDS: ICD-10-CM

## 2021-09-24 DIAGNOSIS — M25.541 ARTHRALGIA OF BOTH HANDS: Primary | ICD-10-CM

## 2021-09-24 DIAGNOSIS — M25.542 ARTHRALGIA OF BOTH HANDS: Primary | ICD-10-CM

## 2021-09-24 DIAGNOSIS — Z13.820 SCREENING FOR OSTEOPOROSIS: ICD-10-CM

## 2021-09-24 DIAGNOSIS — M35.00 SJOGREN'S SYNDROME, WITH UNSPECIFIED ORGAN INVOLVEMENT: ICD-10-CM

## 2021-09-24 DIAGNOSIS — M25.542 ARTHRALGIA OF BOTH HANDS: ICD-10-CM

## 2021-09-24 DIAGNOSIS — Z23 NEED FOR VACCINATION: Primary | ICD-10-CM

## 2021-09-24 PROCEDURE — 99214 PR OFFICE/OUTPT VISIT, EST, LEVL IV, 30-39 MIN: ICD-10-PCS | Mod: S$PBB,,, | Performed by: INTERNAL MEDICINE

## 2021-09-24 PROCEDURE — 99214 OFFICE O/P EST MOD 30 MIN: CPT | Mod: S$PBB,,, | Performed by: INTERNAL MEDICINE

## 2021-09-24 PROCEDURE — 99213 OFFICE O/P EST LOW 20 MIN: CPT | Mod: PBBFAC | Performed by: INTERNAL MEDICINE

## 2021-09-24 PROCEDURE — 99999 PR PBB SHADOW E&M-EST. PATIENT-LVL III: ICD-10-PCS | Mod: PBBFAC,,, | Performed by: INTERNAL MEDICINE

## 2021-09-24 PROCEDURE — 73130 X-RAY EXAM OF HAND: CPT | Mod: 26,50,, | Performed by: RADIOLOGY

## 2021-09-24 PROCEDURE — 99999 PR PBB SHADOW E&M-EST. PATIENT-LVL III: CPT | Mod: PBBFAC,,, | Performed by: INTERNAL MEDICINE

## 2021-09-24 PROCEDURE — 73130 XR HAND COMPLETE 3 VIEWS BILATERAL: ICD-10-PCS | Mod: 26,50,, | Performed by: RADIOLOGY

## 2021-09-24 PROCEDURE — 73130 X-RAY EXAM OF HAND: CPT | Mod: TC,50,FY,PO

## 2021-09-24 PROCEDURE — 0003A COVID-19, MRNA, LNP-S, PF, 30 MCG/0.3 ML DOSE VACCINE: CPT | Mod: PBBFAC | Performed by: FAMILY MEDICINE

## 2021-09-24 PROCEDURE — 91300 COVID-19, MRNA, LNP-S, PF, 30 MCG/0.3 ML DOSE VACCINE: CPT | Mod: PBBFAC,PO

## 2021-09-24 RX ORDER — METHOTREXATE 2.5 MG/1
TABLET ORAL
Qty: 25 TABLET | Refills: 5 | Status: SHIPPED | OUTPATIENT
Start: 2021-09-24 | End: 2022-04-25

## 2021-09-24 RX ORDER — FOLIC ACID 1 MG/1
1 TABLET ORAL DAILY
Qty: 30 TABLET | Refills: 3 | Status: SHIPPED | OUTPATIENT
Start: 2021-09-24 | End: 2022-01-03

## 2021-09-24 RX ORDER — DULOXETIN HYDROCHLORIDE 30 MG/1
CAPSULE, DELAYED RELEASE ORAL
Qty: 60 CAPSULE | Refills: 4 | Status: SHIPPED | OUTPATIENT
Start: 2021-09-24 | End: 2022-01-15

## 2021-09-28 ENCOUNTER — HOSPITAL ENCOUNTER (OUTPATIENT)
Dept: RADIOLOGY | Facility: HOSPITAL | Age: 56
Discharge: HOME | End: 2021-09-28
Attending: INTERNAL MEDICINE
Payer: MEDICARE

## 2021-09-28 DIAGNOSIS — M35.00 SJOGREN'S SYNDROME, WITH UNSPECIFIED ORGAN INVOLVEMENT: ICD-10-CM

## 2021-09-28 DIAGNOSIS — Z13.820 SCREENING FOR OSTEOPOROSIS: ICD-10-CM

## 2021-09-28 PROCEDURE — 77080 DXA BONE DENSITY AXIAL: CPT | Mod: 26,,, | Performed by: RADIOLOGY

## 2021-09-28 PROCEDURE — 77080 DEXA BONE DENSITY SPINE HIP: ICD-10-PCS | Mod: 26,,, | Performed by: RADIOLOGY

## 2021-09-28 PROCEDURE — 77080 DXA BONE DENSITY AXIAL: CPT | Mod: TC,PO

## 2021-10-09 ENCOUNTER — IMMUNIZATION (OUTPATIENT)
Dept: FAMILY MEDICINE | Facility: CLINIC | Age: 56
End: 2021-10-09
Payer: MEDICARE

## 2021-10-09 PROCEDURE — 90686 IIV4 VACC NO PRSV 0.5 ML IM: CPT | Mod: PBBFAC,PO

## 2021-11-11 ENCOUNTER — IMMUNIZATION (OUTPATIENT)
Dept: PHARMACY | Facility: CLINIC | Age: 56
End: 2021-11-11
Payer: MEDICARE

## 2021-12-15 ENCOUNTER — PATIENT MESSAGE (OUTPATIENT)
Dept: RHEUMATOLOGY | Facility: CLINIC | Age: 56
End: 2021-12-15
Payer: MEDICARE

## 2022-01-04 ENCOUNTER — PATIENT MESSAGE (OUTPATIENT)
Dept: ADMINISTRATIVE | Facility: OTHER | Age: 57
End: 2022-01-04
Payer: MEDICARE

## 2022-01-05 ENCOUNTER — LAB VISIT (OUTPATIENT)
Dept: PRIMARY CARE CLINIC | Facility: OTHER | Age: 57
End: 2022-01-05
Payer: MEDICARE

## 2022-01-05 DIAGNOSIS — Z20.822 ENCOUNTER FOR LABORATORY TESTING FOR COVID-19 VIRUS: ICD-10-CM

## 2022-01-05 PROCEDURE — U0003 INFECTIOUS AGENT DETECTION BY NUCLEIC ACID (DNA OR RNA); SEVERE ACUTE RESPIRATORY SYNDROME CORONAVIRUS 2 (SARS-COV-2) (CORONAVIRUS DISEASE [COVID-19]), AMPLIFIED PROBE TECHNIQUE, MAKING USE OF HIGH THROUGHPUT TECHNOLOGIES AS DESCRIBED BY CMS-2020-01-R: HCPCS

## 2022-01-08 ENCOUNTER — PATIENT MESSAGE (OUTPATIENT)
Dept: RHEUMATOLOGY | Facility: CLINIC | Age: 57
End: 2022-01-08
Payer: MEDICARE

## 2022-01-08 LAB
SARS-COV-2 RNA RESP QL NAA+PROBE: NOT DETECTED
SARS-COV-2- CYCLE NUMBER: NORMAL

## 2022-01-20 ENCOUNTER — PATIENT MESSAGE (OUTPATIENT)
Dept: RHEUMATOLOGY | Facility: CLINIC | Age: 57
End: 2022-01-20
Payer: MEDICARE

## 2022-01-23 RX ORDER — BUPROPION HYDROCHLORIDE 300 MG/1
300 TABLET ORAL DAILY
Qty: 30 TABLET | Refills: 3 | Status: SHIPPED | OUTPATIENT
Start: 2022-01-23 | End: 2022-04-19

## 2022-02-23 DIAGNOSIS — D84.9 IMMUNOSUPPRESSED STATUS: ICD-10-CM

## 2022-04-25 ENCOUNTER — OFFICE VISIT (OUTPATIENT)
Dept: RHEUMATOLOGY | Facility: CLINIC | Age: 57
End: 2022-04-25
Payer: MEDICARE

## 2022-04-25 VITALS
HEART RATE: 85 BPM | HEIGHT: 67 IN | BODY MASS INDEX: 35.02 KG/M2 | WEIGHT: 223.13 LBS | DIASTOLIC BLOOD PRESSURE: 88 MMHG | SYSTOLIC BLOOD PRESSURE: 123 MMHG | TEMPERATURE: 98 F

## 2022-04-25 DIAGNOSIS — F32.A DEPRESSION, UNSPECIFIED DEPRESSION TYPE: ICD-10-CM

## 2022-04-25 DIAGNOSIS — M35.00 SJOGREN'S SYNDROME, WITH UNSPECIFIED ORGAN INVOLVEMENT: Primary | ICD-10-CM

## 2022-04-25 PROCEDURE — 99214 PR OFFICE/OUTPT VISIT, EST, LEVL IV, 30-39 MIN: ICD-10-PCS | Mod: S$PBB,,, | Performed by: INTERNAL MEDICINE

## 2022-04-25 PROCEDURE — 99999 PR PBB SHADOW E&M-EST. PATIENT-LVL III: ICD-10-PCS | Mod: PBBFAC,,, | Performed by: INTERNAL MEDICINE

## 2022-04-25 PROCEDURE — 99213 OFFICE O/P EST LOW 20 MIN: CPT | Mod: PBBFAC | Performed by: INTERNAL MEDICINE

## 2022-04-25 PROCEDURE — 99999 PR PBB SHADOW E&M-EST. PATIENT-LVL III: CPT | Mod: PBBFAC,,, | Performed by: INTERNAL MEDICINE

## 2022-04-25 PROCEDURE — 99214 OFFICE O/P EST MOD 30 MIN: CPT | Mod: S$PBB,,, | Performed by: INTERNAL MEDICINE

## 2022-04-25 RX ORDER — SERTRALINE HYDROCHLORIDE 50 MG/1
50 TABLET, FILM COATED ORAL DAILY
Qty: 30 TABLET | Refills: 5 | Status: SHIPPED | OUTPATIENT
Start: 2022-04-25 | End: 2022-06-02

## 2022-04-25 RX ORDER — METHOTREXATE 2.5 MG/1
TABLET ORAL
Qty: 25 TABLET | Refills: 5 | Status: SHIPPED | OUTPATIENT
Start: 2022-04-25 | End: 2022-10-13

## 2022-04-25 RX ORDER — FOLIC ACID 1 MG/1
TABLET ORAL
Qty: 30 TABLET | Refills: 3 | Status: SHIPPED | OUTPATIENT
Start: 2022-04-25 | End: 2022-09-03

## 2022-04-25 ASSESSMENT — ROUTINE ASSESSMENT OF PATIENT INDEX DATA (RAPID3)
FATIGUE SCORE: 7
TOTAL RAPID3 SCORE: 4.72
PATIENT GLOBAL ASSESSMENT SCORE: 5
PSYCHOLOGICAL DISTRESS SCORE: 4.4
AM STIFFNESS SCORE: 1, YES
PAIN SCORE: 7.5
MDHAQ FUNCTION SCORE: 0.5
WHEN YOU AWAKENED IN THE MORNING OVER THE LAST WEEK, PLEASE INDICATE THE AMOUNT OF TIME IT TAKES UNTIL YOU ARE AS LIMBER AS YOU WILL BE FOR THE DAY: 2 HOURS

## 2022-04-25 NOTE — PROGRESS NOTES
Rapid3 Question Responses and Scores 4/21/2022   MDHAQ Score 0.5   Psychologic Score 4.4   Pain Score 7.5   When you awakened in the morning OVER THE LAST WEEK, did you feel stiff? Yes   If Yes, please indicate the number of hours until you are as limber as you will be for the day 2   Fatigue Score 7   Global Health Score 5   RAPID3 Score 4.72     Answers for HPI/ROS submitted by the patient on 4/21/2022  fever: No  eye redness: No  mouth sores: No  headaches: No  shortness of breath: No  chest pain: No  trouble swallowing: No  diarrhea: No  constipation: No  unexpected weight change: No  genital sore: No  During the last 3 days, have you had a skin rash?: No  Bruises or bleeds easily: No  cough: No

## 2022-04-26 ENCOUNTER — LAB VISIT (OUTPATIENT)
Dept: LAB | Facility: HOSPITAL | Age: 57
End: 2022-04-26
Attending: INTERNAL MEDICINE
Payer: MEDICARE

## 2022-04-26 DIAGNOSIS — M35.00 SJOGREN'S SYNDROME, WITH UNSPECIFIED ORGAN INVOLVEMENT: ICD-10-CM

## 2022-04-26 DIAGNOSIS — Z13.820 SCREENING FOR OSTEOPOROSIS: ICD-10-CM

## 2022-04-26 LAB
ALBUMIN SERPL BCP-MCNC: 4.4 G/DL (ref 3.5–5.2)
ALBUMIN SERPL BCP-MCNC: 4.4 G/DL (ref 3.5–5.2)
ALP SERPL-CCNC: 54 U/L (ref 55–135)
ALP SERPL-CCNC: 54 U/L (ref 55–135)
ALT SERPL W/O P-5'-P-CCNC: 31 U/L (ref 10–44)
ALT SERPL W/O P-5'-P-CCNC: 31 U/L (ref 10–44)
ANION GAP SERPL CALC-SCNC: 10 MMOL/L (ref 8–16)
ANION GAP SERPL CALC-SCNC: 10 MMOL/L (ref 8–16)
AST SERPL-CCNC: 26 U/L (ref 10–40)
AST SERPL-CCNC: 26 U/L (ref 10–40)
BASOPHILS # BLD AUTO: 0.09 K/UL (ref 0–0.2)
BASOPHILS # BLD AUTO: 0.09 K/UL (ref 0–0.2)
BASOPHILS NFR BLD: 1.3 % (ref 0–1.9)
BASOPHILS NFR BLD: 1.3 % (ref 0–1.9)
BILIRUB SERPL-MCNC: 0.7 MG/DL (ref 0.1–1)
BILIRUB SERPL-MCNC: 0.7 MG/DL (ref 0.1–1)
BUN SERPL-MCNC: 12 MG/DL (ref 6–20)
BUN SERPL-MCNC: 12 MG/DL (ref 6–20)
C3 SERPL-MCNC: 174 MG/DL (ref 50–180)
C4 SERPL-MCNC: 38 MG/DL (ref 11–44)
CALCIUM SERPL-MCNC: 9.6 MG/DL (ref 8.7–10.5)
CALCIUM SERPL-MCNC: 9.6 MG/DL (ref 8.7–10.5)
CHLORIDE SERPL-SCNC: 101 MMOL/L (ref 95–110)
CHLORIDE SERPL-SCNC: 101 MMOL/L (ref 95–110)
CO2 SERPL-SCNC: 27 MMOL/L (ref 23–29)
CO2 SERPL-SCNC: 27 MMOL/L (ref 23–29)
CREAT SERPL-MCNC: 1 MG/DL (ref 0.5–1.4)
CREAT SERPL-MCNC: 1 MG/DL (ref 0.5–1.4)
CRP SERPL-MCNC: 1.1 MG/L (ref 0–8.2)
DIFFERENTIAL METHOD: ABNORMAL
DIFFERENTIAL METHOD: ABNORMAL
EOSINOPHIL # BLD AUTO: 0.2 K/UL (ref 0–0.5)
EOSINOPHIL # BLD AUTO: 0.2 K/UL (ref 0–0.5)
EOSINOPHIL NFR BLD: 3.2 % (ref 0–8)
EOSINOPHIL NFR BLD: 3.2 % (ref 0–8)
ERYTHROCYTE [DISTWIDTH] IN BLOOD BY AUTOMATED COUNT: 12.6 % (ref 11.5–14.5)
ERYTHROCYTE [DISTWIDTH] IN BLOOD BY AUTOMATED COUNT: 12.6 % (ref 11.5–14.5)
ERYTHROCYTE [SEDIMENTATION RATE] IN BLOOD BY WESTERGREN METHOD: 9 MM/HR (ref 0–23)
EST. GFR  (AFRICAN AMERICAN): >60 ML/MIN/1.73 M^2
EST. GFR  (AFRICAN AMERICAN): >60 ML/MIN/1.73 M^2
EST. GFR  (NON AFRICAN AMERICAN): >60 ML/MIN/1.73 M^2
EST. GFR  (NON AFRICAN AMERICAN): >60 ML/MIN/1.73 M^2
GLUCOSE SERPL-MCNC: 105 MG/DL (ref 70–110)
GLUCOSE SERPL-MCNC: 105 MG/DL (ref 70–110)
HCT VFR BLD AUTO: 47.9 % (ref 40–54)
HCT VFR BLD AUTO: 47.9 % (ref 40–54)
HGB BLD-MCNC: 15.3 G/DL (ref 14–18)
HGB BLD-MCNC: 15.3 G/DL (ref 14–18)
IGA SERPL-MCNC: 159 MG/DL (ref 40–350)
IGG SERPL-MCNC: 725 MG/DL (ref 650–1600)
IGM SERPL-MCNC: 61 MG/DL (ref 50–300)
IMM GRANULOCYTES # BLD AUTO: 0.02 K/UL (ref 0–0.04)
IMM GRANULOCYTES # BLD AUTO: 0.02 K/UL (ref 0–0.04)
IMM GRANULOCYTES NFR BLD AUTO: 0.3 % (ref 0–0.5)
IMM GRANULOCYTES NFR BLD AUTO: 0.3 % (ref 0–0.5)
LYMPHOCYTES # BLD AUTO: 1.9 K/UL (ref 1–4.8)
LYMPHOCYTES # BLD AUTO: 1.9 K/UL (ref 1–4.8)
LYMPHOCYTES NFR BLD: 27.1 % (ref 18–48)
LYMPHOCYTES NFR BLD: 27.1 % (ref 18–48)
MCH RBC QN AUTO: 29.3 PG (ref 27–31)
MCH RBC QN AUTO: 29.3 PG (ref 27–31)
MCHC RBC AUTO-ENTMCNC: 31.9 G/DL (ref 32–36)
MCHC RBC AUTO-ENTMCNC: 31.9 G/DL (ref 32–36)
MCV RBC AUTO: 92 FL (ref 82–98)
MCV RBC AUTO: 92 FL (ref 82–98)
MONOCYTES # BLD AUTO: 0.6 K/UL (ref 0.3–1)
MONOCYTES # BLD AUTO: 0.6 K/UL (ref 0.3–1)
MONOCYTES NFR BLD: 9 % (ref 4–15)
MONOCYTES NFR BLD: 9 % (ref 4–15)
NEUTROPHILS # BLD AUTO: 4.2 K/UL (ref 1.8–7.7)
NEUTROPHILS # BLD AUTO: 4.2 K/UL (ref 1.8–7.7)
NEUTROPHILS NFR BLD: 59.1 % (ref 38–73)
NEUTROPHILS NFR BLD: 59.1 % (ref 38–73)
NRBC BLD-RTO: 0 /100 WBC
NRBC BLD-RTO: 0 /100 WBC
PLATELET # BLD AUTO: 243 K/UL (ref 150–450)
PLATELET # BLD AUTO: 243 K/UL (ref 150–450)
PMV BLD AUTO: 10.7 FL (ref 9.2–12.9)
PMV BLD AUTO: 10.7 FL (ref 9.2–12.9)
POTASSIUM SERPL-SCNC: 4.2 MMOL/L (ref 3.5–5.1)
POTASSIUM SERPL-SCNC: 4.2 MMOL/L (ref 3.5–5.1)
PROT SERPL-MCNC: 7.5 G/DL (ref 6–8.4)
PROT SERPL-MCNC: 7.5 G/DL (ref 6–8.4)
RBC # BLD AUTO: 5.22 M/UL (ref 4.6–6.2)
RBC # BLD AUTO: 5.22 M/UL (ref 4.6–6.2)
SODIUM SERPL-SCNC: 138 MMOL/L (ref 136–145)
SODIUM SERPL-SCNC: 138 MMOL/L (ref 136–145)
WBC # BLD AUTO: 7.11 K/UL (ref 3.9–12.7)
WBC # BLD AUTO: 7.11 K/UL (ref 3.9–12.7)

## 2022-04-26 PROCEDURE — 85651 RBC SED RATE NONAUTOMATED: CPT | Mod: PO | Performed by: INTERNAL MEDICINE

## 2022-04-26 PROCEDURE — 82306 VITAMIN D 25 HYDROXY: CPT | Performed by: INTERNAL MEDICINE

## 2022-04-26 PROCEDURE — 86225 DNA ANTIBODY NATIVE: CPT | Performed by: INTERNAL MEDICINE

## 2022-04-26 PROCEDURE — 85652 RBC SED RATE AUTOMATED: CPT | Performed by: INTERNAL MEDICINE

## 2022-04-26 PROCEDURE — 82595 ASSAY OF CRYOGLOBULIN: CPT | Performed by: INTERNAL MEDICINE

## 2022-04-26 PROCEDURE — 82784 ASSAY IGA/IGD/IGG/IGM EACH: CPT | Performed by: INTERNAL MEDICINE

## 2022-04-26 PROCEDURE — 80053 COMPREHEN METABOLIC PANEL: CPT | Performed by: INTERNAL MEDICINE

## 2022-04-26 PROCEDURE — 86140 C-REACTIVE PROTEIN: CPT | Performed by: INTERNAL MEDICINE

## 2022-04-26 PROCEDURE — 86160 COMPLEMENT ANTIGEN: CPT | Performed by: INTERNAL MEDICINE

## 2022-04-26 PROCEDURE — 85025 COMPLETE CBC W/AUTO DIFF WBC: CPT | Performed by: INTERNAL MEDICINE

## 2022-04-26 PROCEDURE — 86160 COMPLEMENT ANTIGEN: CPT | Mod: 59 | Performed by: INTERNAL MEDICINE

## 2022-04-27 LAB
25(OH)D3+25(OH)D2 SERPL-MCNC: 25 NG/ML (ref 30–96)
DSDNA AB SER-ACNC: NORMAL [IU]/ML

## 2022-04-28 ENCOUNTER — PATIENT MESSAGE (OUTPATIENT)
Dept: RHEUMATOLOGY | Facility: CLINIC | Age: 57
End: 2022-04-28
Payer: MEDICARE

## 2022-05-06 LAB — CRYOGLOB SER QL: NORMAL

## 2022-06-02 ENCOUNTER — OFFICE VISIT (OUTPATIENT)
Dept: PSYCHIATRY | Facility: CLINIC | Age: 57
End: 2022-06-02
Payer: MEDICARE

## 2022-06-02 VITALS
HEIGHT: 67 IN | SYSTOLIC BLOOD PRESSURE: 152 MMHG | BODY MASS INDEX: 33.39 KG/M2 | DIASTOLIC BLOOD PRESSURE: 73 MMHG | HEART RATE: 89 BPM | WEIGHT: 212.75 LBS

## 2022-06-02 DIAGNOSIS — F33.1 MODERATE EPISODE OF RECURRENT MAJOR DEPRESSIVE DISORDER: Primary | ICD-10-CM

## 2022-06-02 PROCEDURE — 99204 OFFICE O/P NEW MOD 45 MIN: CPT | Mod: S$PBB,GC,, | Performed by: STUDENT IN AN ORGANIZED HEALTH CARE EDUCATION/TRAINING PROGRAM

## 2022-06-02 PROCEDURE — 99204 PR OFFICE/OUTPT VISIT, NEW, LEVL IV, 45-59 MIN: ICD-10-PCS | Mod: S$PBB,GC,, | Performed by: STUDENT IN AN ORGANIZED HEALTH CARE EDUCATION/TRAINING PROGRAM

## 2022-06-02 PROCEDURE — 99212 OFFICE O/P EST SF 10 MIN: CPT | Mod: PBBFAC | Performed by: STUDENT IN AN ORGANIZED HEALTH CARE EDUCATION/TRAINING PROGRAM

## 2022-06-02 PROCEDURE — 99999 PR PBB SHADOW E&M-EST. PATIENT-LVL II: ICD-10-PCS | Mod: PBBFAC,GC,, | Performed by: STUDENT IN AN ORGANIZED HEALTH CARE EDUCATION/TRAINING PROGRAM

## 2022-06-02 PROCEDURE — 99999 PR PBB SHADOW E&M-EST. PATIENT-LVL II: CPT | Mod: PBBFAC,GC,, | Performed by: STUDENT IN AN ORGANIZED HEALTH CARE EDUCATION/TRAINING PROGRAM

## 2022-06-02 RX ORDER — HYDROXYZINE PAMOATE 25 MG/1
25 CAPSULE ORAL NIGHTLY PRN
Qty: 30 CAPSULE | Refills: 1 | Status: SHIPPED | OUTPATIENT
Start: 2022-06-02 | End: 2022-09-27

## 2022-06-02 RX ORDER — SERTRALINE HYDROCHLORIDE 100 MG/1
100 TABLET, FILM COATED ORAL DAILY
Qty: 30 TABLET | Refills: 3 | Status: SHIPPED | OUTPATIENT
Start: 2022-06-02 | End: 2022-06-30
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
## 2022-06-02 NOTE — PROGRESS NOTES
06/02/2022: I interviewed this patient and discussed his treatment plan and diagnosis with Dr Wiedemann and agree with both at this time. NICOLAS Villalobos MD

## 2022-06-16 ENCOUNTER — OFFICE VISIT (OUTPATIENT)
Dept: FAMILY MEDICINE | Facility: CLINIC | Age: 57
End: 2022-06-16
Payer: MEDICARE

## 2022-06-16 VITALS
OXYGEN SATURATION: 96 % | BODY MASS INDEX: 33.67 KG/M2 | SYSTOLIC BLOOD PRESSURE: 120 MMHG | HEART RATE: 107 BPM | WEIGHT: 214.94 LBS | DIASTOLIC BLOOD PRESSURE: 76 MMHG

## 2022-06-16 DIAGNOSIS — M35.00 SJOGREN'S SYNDROME, WITH UNSPECIFIED ORGAN INVOLVEMENT: ICD-10-CM

## 2022-06-16 DIAGNOSIS — R09.81 NASAL CONGESTION: ICD-10-CM

## 2022-06-16 DIAGNOSIS — U07.1 COVID-19 VIRUS DETECTED: ICD-10-CM

## 2022-06-16 DIAGNOSIS — E78.5 HYPERLIPIDEMIA, UNSPECIFIED HYPERLIPIDEMIA TYPE: ICD-10-CM

## 2022-06-16 DIAGNOSIS — U07.1 COVID-19: Primary | ICD-10-CM

## 2022-06-16 PROBLEM — S46.219A BICEPS TENDON TEAR: Status: RESOLVED | Noted: 2019-06-21 | Resolved: 2022-06-16

## 2022-06-16 PROBLEM — K11.20 PAROTITIS: Status: RESOLVED | Noted: 2018-05-11 | Resolved: 2022-06-16

## 2022-06-16 PROBLEM — R60.9 PAROTID SWELLING: Status: RESOLVED | Noted: 2018-04-03 | Resolved: 2022-06-16

## 2022-06-16 LAB
CTP QC/QA: YES
SARS-COV-2 RDRP RESP QL NAA+PROBE: POSITIVE

## 2022-06-16 PROCEDURE — 99999 PR PBB SHADOW E&M-EST. PATIENT-LVL III: ICD-10-PCS | Mod: PBBFAC,CR,, | Performed by: PHYSICIAN ASSISTANT

## 2022-06-16 PROCEDURE — 99214 OFFICE O/P EST MOD 30 MIN: CPT | Mod: S$PBB,CR,, | Performed by: PHYSICIAN ASSISTANT

## 2022-06-16 PROCEDURE — 99999 PR PBB SHADOW E&M-EST. PATIENT-LVL III: CPT | Mod: PBBFAC,CR,, | Performed by: PHYSICIAN ASSISTANT

## 2022-06-16 PROCEDURE — 99213 OFFICE O/P EST LOW 20 MIN: CPT | Mod: PBBFAC,PO | Performed by: PHYSICIAN ASSISTANT

## 2022-06-16 PROCEDURE — U0002 COVID-19 LAB TEST NON-CDC: HCPCS | Mod: PBBFAC,PO | Performed by: PHYSICIAN ASSISTANT

## 2022-06-16 PROCEDURE — 99214 PR OFFICE/OUTPT VISIT, EST, LEVL IV, 30-39 MIN: ICD-10-PCS | Mod: S$PBB,CR,, | Performed by: PHYSICIAN ASSISTANT

## 2022-06-16 RX ORDER — FLUTICASONE PROPIONATE 50 MCG
1 SPRAY, SUSPENSION (ML) NASAL DAILY
Qty: 18.2 ML | Refills: 0 | Status: SHIPPED | OUTPATIENT
Start: 2022-06-16 | End: 2022-06-26

## 2022-06-16 RX ORDER — METHYLPREDNISOLONE 4 MG/1
TABLET ORAL
Qty: 1 EACH | Refills: 0 | Status: SHIPPED | OUTPATIENT
Start: 2022-06-16 | End: 2022-06-16 | Stop reason: CLARIF

## 2022-06-16 NOTE — PROGRESS NOTES
Subjective:       Patient ID: Van Jones is a 56 y.o. male.    Chief Complaint: Allergies    HPI     Pt is new to me and this clinic, PCP Dr. Fermin.     Pt is a 56 year old male with depression, hLD, sjogren's syndrome, hx of PE. He presents today complaining of nasal congestion, scratchy throat, sneezing, and ear discomfort. Yesterday he took an at home covid tets, which was negative. He did just get back from [place] for his son's graduation. No fever, chills. Mild, irritating cough. No N/V/D, myalgias. Taking claritin once daily with night-time benadryl + advil, and this is not helping symptoms.     Review of Systems   Constitutional: Negative for activity change, appetite change, chills, fatigue, fever and unexpected weight change.   HENT: Positive for nasal congestion, postnasal drip, sinus pressure/congestion, sneezing and sore throat. Negative for ear pain and hearing loss.    Eyes: Negative for visual disturbance.   Respiratory: Positive for cough. Negative for chest tightness, shortness of breath and wheezing.    Cardiovascular: Negative for chest pain and palpitations.   Gastrointestinal: Negative for abdominal pain, change in bowel habit, constipation, diarrhea, nausea, vomiting, reflux and change in bowel habit.   Genitourinary: Negative for difficulty urinating, dysuria, erectile dysfunction, frequency and urgency.   Musculoskeletal: Negative for arthralgias and myalgias.   Integumentary:  Negative for rash.   Neurological: Negative for dizziness, vertigo, weakness, light-headedness and headaches.   Hematological: Does not bruise/bleed easily.   Psychiatric/Behavioral: Negative for dysphoric mood and sleep disturbance. The patient is not nervous/anxious.            Objective:      Physical Exam  Vitals and nursing note reviewed.   Constitutional:       General: He is not in acute distress.     Appearance: Normal appearance. He is not ill-appearing, toxic-appearing or diaphoretic.   HENT:       Head: Normocephalic and atraumatic.      Right Ear: Tympanic membrane, ear canal and external ear normal. There is no impacted cerumen.      Left Ear: Tympanic membrane, ear canal and external ear normal. There is no impacted cerumen.      Nose: Congestion and rhinorrhea (clear) present.      Mouth/Throat:      Mouth: Mucous membranes are moist.      Pharynx: Oropharynx is clear. Posterior oropharyngeal erythema (mild) present. No oropharyngeal exudate.   Eyes:      General: No scleral icterus.        Right eye: No discharge.         Left eye: No discharge.      Extraocular Movements: Extraocular movements intact.      Conjunctiva/sclera: Conjunctivae normal.      Pupils: Pupils are equal, round, and reactive to light.   Cardiovascular:      Rate and Rhythm: Normal rate and regular rhythm.      Pulses: Normal pulses.      Heart sounds: Normal heart sounds. No murmur heard.    No friction rub. No gallop.   Pulmonary:      Effort: Pulmonary effort is normal. No respiratory distress.      Breath sounds: Normal breath sounds. No stridor. No wheezing, rhonchi or rales.   Abdominal:      General: Abdomen is flat. Bowel sounds are normal. There is no distension.      Palpations: Abdomen is soft. There is no mass.      Tenderness: There is no abdominal tenderness. There is no guarding or rebound.   Musculoskeletal:         General: No deformity or signs of injury. Normal range of motion.      Cervical back: Neck supple. No muscular tenderness.      Right lower leg: No edema.      Left lower leg: No edema.   Lymphadenopathy:      Cervical: No cervical adenopathy.   Skin:     General: Skin is warm.      Capillary Refill: Capillary refill takes less than 2 seconds.      Coloration: Skin is not jaundiced or pale.      Findings: No rash.   Neurological:      General: No focal deficit present.      Mental Status: He is alert and oriented to person, place, and time. Mental status is at baseline.   Psychiatric:         Mood  and Affect: Mood normal.         Behavior: Behavior normal.         Thought Content: Thought content normal.         Judgment: Judgment normal.           Covid risk score:  3      Assessment:       Problem List Items Addressed This Visit        Cardiac/Vascular    HLD (hyperlipidemia)       Immunology/Multi System    Sjogren's syndrome      Other Visit Diagnoses     Nasal congestion    -  Primary    Relevant Medications    fluticasone propionate (FLONASE) 50 mcg/actuation nasal spray    Other Relevant Orders    POCT COVID-19 Rapid Screening (Completed)    COVID-19        Relevant Orders    Ambulatory referral/consult to [facility]          Plan:           1. COVID-19  - Ambulatory referral/consult to [facility]; Future    2. Nasal congestion  - POCT COVID-19 Rapid Screening  - fluticasone propionate (FLONASE) 50 mcg/actuation nasal spray; 1 spray (50 mcg total) by Each Nostril route once daily. for 10 days  Dispense: 18.2 mL; Refill: 0    3. Sjogren's syndrome, with unspecified organ involvement  -will discuss with patient's rheumatologist if she should hold MTX during period of active infection    4. Hyperlipidemia, unspecified hyperlipidemia type  -continue statin    RTC/Er precautions given. F/U if symptoms persist or worsen

## 2022-06-16 NOTE — PATIENT INSTRUCTIONS
A few reminders from today:    Start flonase  Start mucinex fast max all in one---day and night capsules  Only start steroid cal if no improvement. Let us know symptoms worsen.   You are covid positive.   Continue daily multivitamin.   Rest and eat healthy. Stay hydrated.     Home care strategies and care:    -Acetaminophen (Tylenol) and Ibuprofen (Motrin) for management of body aches and fever/chills. Taken as directed on the bottle, these medications can be put in rotation with one another, if needed, to provide better control of symptoms.   - Guaifenesin (Mucinex) for chest congestion. It is essential that you ensure GOOD HYDRATION when taking this medication. Stop using this if you are no longer coughing up mucous or phlegm.   - If you are experiencing a dry cough, cough suppressants may be more appropriate. Ask your pharmacists for recommendations on available brands.   -NAUSEA can be controlled with over the counter Emetrol, use as directed. Good hydration is also tied to the ability to control nausea.   -GOOD NUTRITION raw fruits, vegetables, soups, and lean meats.   -HYDRATION is VERY important. Water has the highest value, but low sugar Gatorade or Poweraide can also be used in moderation to help maintain hydration. Avoid caffeine or sugary drinks.     ED for Fever > 102 not resolving with medication, significant shortness of breath or chest pain, Oxygen level less than 93%, or other worsening symptoms.        Re-testing for 'clearance' of the virus is not recommended. We use a symptom or time-based method to determine when someone is no longer infectious and can stop isolation:    If you have symptoms and tested positive:  More than 5 days since symptoms first appeared AND  More than 24 hours fever free without medications AND        symptoms have improved    For five days after ending isolation, masks are required.      If you had no symptoms but tested positive:  More than 5 days since the date of the  first positive test. If you develop symptoms, then use the guidelines above  For five days after ending isolation, masks are required.      For additional information, please reference our [facility] at [email or web address]    Remain vigilant on prevention strategies:  Wash your hands frequently.  Consider masking indoors.  Socially distance from others.  Cough or sneeze into your elbow or a tissue.  Stay home if you experience symptoms.  Avoid crowded spaces.        Follow up with me if needed.   Please go to ER/urgent care if after hours or symptoms persist/worsen.     Do not hesitate to get in touch with me should you have any further questions.     Thank you for trusting me with your care!  I wish you health and happiness.    -Kim Morejon PA-C

## 2022-06-17 ENCOUNTER — TELEPHONE (OUTPATIENT)
Dept: FAMILY MEDICINE | Facility: CLINIC | Age: 57
End: 2022-06-17
Payer: MEDICARE

## 2022-06-17 DIAGNOSIS — U07.1 COVID: Primary | ICD-10-CM

## 2022-06-17 NOTE — TELEPHONE ENCOUNTER
"Messaged with pt's rheumatologist:     "I would hold methotrexate for 10 days and resume after all symptoms are negative"      Called pt and relayed message. He understands  "

## 2022-06-17 NOTE — TELEPHONE ENCOUNTER
----- Message from Kim Morejon PA-C sent at 6/16/2022  4:35 PM CDT -----  Check with rheum about MTX

## 2022-06-17 NOTE — TELEPHONE ENCOUNTER
----- Message from Monserrat Turner, Patient Care Assistant sent at 6/17/2022 11:43 AM CDT -----  Regarding: advice  Contact: pt  Type: Needs Medical Advice  Who Called: pt   Symptoms (please be specific):  COVID positive   Pharmacy name and phone #:    [facility] #[id number] - [facility] [address] [address] AT [facility] [address] [address]  [id number] [place] [id number]  Phone: [phone] Fax: [phone]    Best Call Back Number: [phone] (home)     Additional Information: pt states he would like a callback regarding an medication. Please call pt to advise. Thanks!

## 2022-06-30 ENCOUNTER — OFFICE VISIT (OUTPATIENT)
Dept: PSYCHIATRY | Facility: CLINIC | Age: 57
End: 2022-06-30
Payer: MEDICARE

## 2022-06-30 VITALS
SYSTOLIC BLOOD PRESSURE: 128 MMHG | BODY MASS INDEX: 33.39 KG/M2 | DIASTOLIC BLOOD PRESSURE: 76 MMHG | HEART RATE: 87 BPM | WEIGHT: 213.19 LBS

## 2022-06-30 DIAGNOSIS — F32.A DEPRESSION, UNSPECIFIED DEPRESSION TYPE: ICD-10-CM

## 2022-06-30 DIAGNOSIS — F33.1 MODERATE EPISODE OF RECURRENT MAJOR DEPRESSIVE DISORDER: ICD-10-CM

## 2022-06-30 PROCEDURE — 99999 PR PBB SHADOW E&M-EST. PATIENT-LVL II: ICD-10-PCS | Mod: PBBFAC,GC,, | Performed by: STUDENT IN AN ORGANIZED HEALTH CARE EDUCATION/TRAINING PROGRAM

## 2022-06-30 PROCEDURE — 99212 OFFICE O/P EST SF 10 MIN: CPT | Mod: PBBFAC | Performed by: STUDENT IN AN ORGANIZED HEALTH CARE EDUCATION/TRAINING PROGRAM

## 2022-06-30 PROCEDURE — 99213 OFFICE O/P EST LOW 20 MIN: CPT | Mod: S$PBB,GC,, | Performed by: STUDENT IN AN ORGANIZED HEALTH CARE EDUCATION/TRAINING PROGRAM

## 2022-06-30 PROCEDURE — 99213 PR OFFICE/OUTPT VISIT, EST, LEVL III, 20-29 MIN: ICD-10-PCS | Mod: S$PBB,GC,, | Performed by: STUDENT IN AN ORGANIZED HEALTH CARE EDUCATION/TRAINING PROGRAM

## 2022-06-30 PROCEDURE — 99999 PR PBB SHADOW E&M-EST. PATIENT-LVL II: CPT | Mod: PBBFAC,GC,, | Performed by: STUDENT IN AN ORGANIZED HEALTH CARE EDUCATION/TRAINING PROGRAM

## 2022-06-30 RX ORDER — SERTRALINE HYDROCHLORIDE 100 MG/1
100 TABLET, FILM COATED ORAL DAILY
Qty: 90 TABLET | Refills: 1 | Status: SHIPPED | OUTPATIENT
Start: 2022-06-30 | End: 2022-09-27 | Stop reason: SDUPTHER

## 2022-06-30 NOTE — PROGRESS NOTES
06/30/2022: I reviewed and discussed this patient's treatment plan and diagnosis with Dr Wiedemann and agree with both at times. NICOLAS Villalobos MD

## 2022-06-30 NOTE — PROGRESS NOTES
"  6/30/2022 9:10 AM   Van Jones   1965   [id number]           OUTPATIENT PSYCHIATRY FOLLOW- UP VISIT    Reason for Encounter:  Van Jones, a 56 y.o. male,who presents today for follow up of No chief complaint on file.  .  Met with patient.    Interval History and Content of Current Session:    Today,  Pt reports since last visit he is feeling "much better." "I havent felt this great since 2003." Reports it took a week or so before he started noticing the benefits from sertraline. Reports improved mood on a daily basis. "Not feeling sad all the time. Not crying anymore." Reports his father has also noticed some improvement in his mood as well.     Reports sleep is "a little better" recently. Still has some lingering symptoms of covid which continues to affect his energy level but states that his health is continuing to improve.      Reports he does use vistaril about 1-2x/week for sleep initiation. Denies significant adverse effects, denies constipation, sleepiness, brain fog, etc.      Social stressors:      Psychiatric Review Of Systems - Is patient experiencing or having changes in:      sleep:  7-8 hours of good sleep, no issues falling asleep   appetite: no  weight: no  energy/anergy: unsure due to recent bout of covid   interest/pleasure/anhedonia: improved anhedonia around family and friends   somatic symptoms: no  guilty/hopelessness: no  concentration: improvement   S.I.B.s/risky behavior: no  SI/SA:  no    anxiety/panic: no  Agoraphobia:  no  Social phobia:  no  Recurrent nightmares:  no  hyper startle response:  no  Avoidance: no  Recurrent thoughts:  no  Recurrent behaviors:  no    Irritability: no - "I feel more calm. Things arent really bothering me as much."   Racing thoughts: no  Impulsive behaviors: no  Pressured speech:  no    Paranoia:no  Delusions: no  AVH:no      Risk Parameters:  Patient reports no suicidal ideation  Patient reports no homicidal ideation  Patient reports no " self-injurious behavior  Patient reports no violent behavior    Psychotropic medication review      Current meds-    Compliance: no    Side effects: denies stomach upset, sexual side effects, weight gain       Substance use  Tobacco-  ETOH-  Illicit substances-    Review of Systems     Past Medical, Family and Social History: The patient's past medical, family and social history have been reviewed and updated as appropriate within the electronic medical record - see encounter notes.    Medical Review of Symptoms  History obtained from the patient   General : NO chills or fever   Respiratory: NO cough, shortness of breath   Cardiovascular: NO chest pain, palpitations or racing heart   Gastrointestinal: NO nausea, vomiting, constipation or diarrhea   Neurological: NO confusion, dizziness, headaches or tremors   Psychiatric: please see HPI     Objective     ALL MEDICATIONS:    Current Outpatient Medications:     folic acid (FOLVITE) 1 MG tablet, TAKE 1 TABLET(1 MG) BY MOUTH EVERY DAY, Disp: 30 tablet, Rfl: 3    hydrOXYzine pamoate (VISTARIL) 25 MG Cap, Take 1 capsule (25 mg total) by mouth nightly as needed (insomnia)., Disp: 30 capsule, Rfl: 1    methotrexate 2.5 MG Tab, 5 tabs weekly, Disp: 25 tablet, Rfl: 5    multivitamin with minerals tablet, Take 1 tablet by mouth once daily., Disp: , Rfl:     rosuvastatin (CRESTOR) 10 MG tablet, Take 10 mg by mouth every evening. , Disp: , Rfl:     sertraline (ZOLOFT) 100 MG tablet, Take 1 tablet (100 mg total) by mouth once daily., Disp: 30 tablet, Rfl: 3    ALLERGIES:  Review of patient's allergies indicates:   Allergen Reactions    Escitalopram oxalate     Cymbalta [duloxetine] Rash     Red rashes on arms and chest    Lamisil [terbinafine hcl] Rash       RELEVANT LABS/STUDIES:    Lab Results   Component Value Date    WBC 7.11 04/26/2022    WBC 7.11 04/26/2022    HGB 15.3 04/26/2022    HGB 15.3 04/26/2022    HCT 47.9 04/26/2022    HCT 47.9 04/26/2022    MCV 92  04/26/2022    MCV 92 04/26/2022     04/26/2022     04/26/2022     BMP  Lab Results   Component Value Date     04/26/2022     04/26/2022    K 4.2 04/26/2022    K 4.2 04/26/2022     04/26/2022     04/26/2022    CO2 27 04/26/2022    CO2 27 04/26/2022    BUN 12 04/26/2022    BUN 12 04/26/2022    CREATININE 1.0 04/26/2022    CREATININE 1.0 04/26/2022    CALCIUM 9.6 04/26/2022    CALCIUM 9.6 04/26/2022    ANIONGAP 10 04/26/2022    ANIONGAP 10 04/26/2022    ESTGFRAFRICA >60.0 04/26/2022    ESTGFRAFRICA >60.0 04/26/2022    EGFRNONAA >60.0 04/26/2022    EGFRNONAA >60.0 04/26/2022     Lab Results   Component Value Date    ALT 31 04/26/2022    ALT 31 04/26/2022    AST 26 04/26/2022    AST 26 04/26/2022    ALKPHOS 54 (L) 04/26/2022    ALKPHOS 54 (L) 04/26/2022    BILITOT 0.7 04/26/2022    BILITOT 0.7 04/26/2022     Lab Results   Component Value Date    TSH 1.481 12/14/2017     No results found for: LABA1C, HGBA1C    Constitutional  Vitals:  Most recent vital signs, dated less than 90 days prior to this appointment, were reviewed.    Vitals:    06/30/22 0841   BP: 128/76   Pulse: 87   Weight: 96.7 kg (213 lb 3 oz)            PHYSICAL EXAM  General: well developed, well nourished  Neurologic:   Gait: Normal   Psychomotor signs:  No involuntary movements or tremor  AIMS: none    PSYCHIATRIC EXAM:     Mental Status Exam:  Appearance: unremarkable, age appropriate  Behavior/Cooperation: normal, cooperative  Speech: normal tone, normal rate, normal pitch, normal volume  Language: uses words appropriately; NO aphasia or dysarthria  Mood: steady, happy  Affect - much improved   Thought Process: normal and logical  Thought Content: normal, no suicidality, no homicidality, delusions, or paranoia  Level of Consciousness: Alert and Oriented x3  Memory:  Intact  Attention/concentration: appropriate for age/education.   Fund of Knowledge: appears adequate  Insight:  Good   Judgment: good      Assessment  and Diagnosis   Status/Progress: Based on the examination today, the patient's problem(s) is/are improved.  New problems have been presented today.   Co-morbidities, Diagnostic uncertainty and Lack of compliance are not complicating management of the primary condition.  Patient with significant response to increased dose of sertraline resulting in robust symptoms improvement. Will continue current regimen. Prognosis is good going forwards.     General Impression:       ICD-10-CM ICD-9-CM   1. Depression, unspecified depression type  F32.A 311       Intervention/Counseling/Treatment Plan   · Medication Management:    · Continue sertraline 100mg daily      · Labs: reviewed most recent  · The treatment plan and follow up plan were reviewed with the patient.  · Discussed with patient informed consent, risks vs. benefits, alternative treatments, side effect profile and the inherent unpredictability of individual responses to these treatments. The patient expresses understanding of the above and displays the capacity to agree with this current plan and had no other questions.  · Encouraged Patient to keep future appointments.   · Take medications as prescribed and abstain from substance abuse.   · In the event of an emergency patient was advised to go to the emergency room.    Return to Clinic: 3 months    > than 50% of total time spend on coordination of care and counseling   (which included pts differential diagnosis and prognosis for psychiatric conditions, risks, benefits of treatments, instructions and adherence to treatment plan, risk reduction, reviewing current psychiatric medication regimen, medical problems and social stressors. In addtion to possible discussion with other healthcare provider/s)    Add on Psychotherapy time:0  Total Face time:20 minutes     Ross Wiedemann, MD  [facility] PGY-3  [facility]

## 2022-08-11 ENCOUNTER — PATIENT MESSAGE (OUTPATIENT)
Dept: RHEUMATOLOGY | Facility: CLINIC | Age: 57
End: 2022-08-11
Payer: MEDICARE

## 2022-08-11 ENCOUNTER — PATIENT MESSAGE (OUTPATIENT)
Dept: FAMILY MEDICINE | Facility: CLINIC | Age: 57
End: 2022-08-11
Payer: MEDICARE

## 2022-08-12 RX ORDER — CYCLOBENZAPRINE HCL 5 MG
5-10 TABLET ORAL NIGHTLY
Qty: 60 TABLET | Refills: 1 | Status: SHIPPED | OUTPATIENT
Start: 2022-08-12 | End: 2022-09-11

## 2022-09-03 RX ORDER — FOLIC ACID 1 MG/1
TABLET ORAL
Qty: 30 TABLET | Refills: 3 | Status: SHIPPED | OUTPATIENT
Start: 2022-09-03 | End: 2022-10-13

## 2022-09-27 ENCOUNTER — OFFICE VISIT (OUTPATIENT)
Dept: PSYCHIATRY | Facility: CLINIC | Age: 57
End: 2022-09-27
Payer: MEDICARE

## 2022-09-27 VITALS
DIASTOLIC BLOOD PRESSURE: 89 MMHG | BODY MASS INDEX: 26.43 KG/M2 | SYSTOLIC BLOOD PRESSURE: 143 MMHG | WEIGHT: 168.75 LBS | HEART RATE: 104 BPM

## 2022-09-27 DIAGNOSIS — G47.00 INSOMNIA, UNSPECIFIED TYPE: ICD-10-CM

## 2022-09-27 DIAGNOSIS — F33.1 MODERATE EPISODE OF RECURRENT MAJOR DEPRESSIVE DISORDER: Primary | ICD-10-CM

## 2022-09-27 PROCEDURE — 99213 PR OFFICE/OUTPT VISIT, EST, LEVL III, 20-29 MIN: ICD-10-PCS | Mod: S$PBB,GC,, | Performed by: STUDENT IN AN ORGANIZED HEALTH CARE EDUCATION/TRAINING PROGRAM

## 2022-09-27 PROCEDURE — 99213 OFFICE O/P EST LOW 20 MIN: CPT | Mod: S$PBB,GC,, | Performed by: STUDENT IN AN ORGANIZED HEALTH CARE EDUCATION/TRAINING PROGRAM

## 2022-09-27 PROCEDURE — 99999 PR PBB SHADOW E&M-EST. PATIENT-LVL II: CPT | Mod: PBBFAC,GC,, | Performed by: STUDENT IN AN ORGANIZED HEALTH CARE EDUCATION/TRAINING PROGRAM

## 2022-09-27 PROCEDURE — 99212 OFFICE O/P EST SF 10 MIN: CPT | Mod: PBBFAC | Performed by: STUDENT IN AN ORGANIZED HEALTH CARE EDUCATION/TRAINING PROGRAM

## 2022-09-27 PROCEDURE — 99999 PR PBB SHADOW E&M-EST. PATIENT-LVL II: ICD-10-PCS | Mod: PBBFAC,GC,, | Performed by: STUDENT IN AN ORGANIZED HEALTH CARE EDUCATION/TRAINING PROGRAM

## 2022-09-27 RX ORDER — MIRTAZAPINE 15 MG/1
7.5 TABLET, FILM COATED ORAL NIGHTLY
Qty: 15 TABLET | Refills: 2 | Status: SHIPPED | OUTPATIENT
Start: 2022-09-27 | End: 2022-10-13

## 2022-09-27 RX ORDER — SERTRALINE HYDROCHLORIDE 100 MG/1
100 TABLET, FILM COATED ORAL DAILY
Qty: 90 TABLET | Refills: 0 | Status: SHIPPED | OUTPATIENT
Start: 2022-09-27 | End: 2023-04-24

## 2022-09-27 NOTE — PROGRESS NOTES
"  9/27/2022 9:10 AM   Van Jones   1965   [id number]           OUTPATIENT PSYCHIATRY FOLLOW- UP VISIT    Reason for Encounter:  Van Jones, a 57 y.o. male,who presents today for follow up of depression. Met with patient.    Interval History and Content of Current Session:  Today, patient reports overall doing well since last appointment. Very happy with current sertraline dosing. We spoke about his psychiatric and medical history with a focus on RSD, Sjogren's, and depression. Depression well controlled at this point. However, he is having insomnia that is not controlled with Vistaril prescribed by Dr. Wiedemann. He tried Flexeril prescribed by rheum but is having significant morning fatigue. No SI. No prior psych hospitalizations.    We discussed two options for insomnia: trazodone and mirtazapine. He would prefer to go with mirtazapine at this time due to trazodone's risk of priapism. He was informed of the risk of increased appetite with mirtazapine.     Social stressors:  none    Psychiatric Review Of Systems - Is patient experiencing or having changes in:      sleep:  sleep onset and maintenance insomnia.   appetite: no  weight: no  energy/anergy: no  interest/pleasure/anhedonia: improved anhedonia around family and friends   somatic symptoms: no  guilty/hopelessness: no  concentration: improvement   S.I.B.s/risky behavior: no  SI/SA:  no    anxiety/panic: no  Agoraphobia:  no  Social phobia:  no  Recurrent nightmares:  no  hyper startle response:  no  Avoidance: no  Recurrent thoughts:  no  Recurrent behaviors:  no    Irritability: no - "I feel more calm. Things arent really bothering me as much."   Racing thoughts: no  Impulsive behaviors: no  Pressured speech:  no    Paranoia:no  Delusions: no  AVH:no      Risk Parameters:  Patient reports no suicidal ideation  Patient reports no homicidal ideation  Patient reports no self-injurious behavior  Patient reports no violent " behavior    Psychotropic medication review  Sertraline 100mg daily    Current meds-  Sertraline 100mg daily    Compliance: yes    Side effects:  denies stomach upset, sexual side effects, weight gain       Review of Systems     Past Medical, Family and Social History: The patient's past medical, family and social history have been reviewed and updated as appropriate within the electronic medical record - see encounter notes.    Medical Review of Symptoms  History obtained from the patient  General : NO chills or fever  Respiratory: NO cough, shortness of breath  Cardiovascular: NO chest pain, palpitations or racing heart  Gastrointestinal: NO nausea, vomiting, constipation or diarrhea  Neurological: NO confusion, dizziness, headaches or tremors  Psychiatric: please see HPI     Objective     ALL MEDICATIONS:    Current Outpatient Medications:     folic acid (FOLVITE) 1 MG tablet, TAKE 1 TABLET(1 MG) BY MOUTH EVERY DAY, Disp: 30 tablet, Rfl: 3    folic acid (FOLVITE) 1 MG tablet, TAKE 1 TABLET(1 MG) BY MOUTH EVERY DAY, Disp: 30 tablet, Rfl: 3    hydrOXYzine pamoate (VISTARIL) 25 MG Cap, Take 1 capsule (25 mg total) by mouth nightly as needed (insomnia)., Disp: 30 capsule, Rfl: 1    methotrexate 2.5 MG Tab, 5 tabs weekly, Disp: 25 tablet, Rfl: 5    multivitamin with minerals tablet, Take 1 tablet by mouth once daily., Disp: , Rfl:     rosuvastatin (CRESTOR) 10 MG tablet, Take 10 mg by mouth every evening. , Disp: , Rfl:     sertraline (ZOLOFT) 100 MG tablet, Take 1 tablet (100 mg total) by mouth once daily., Disp: 90 tablet, Rfl: 1    ALLERGIES:  Review of patient's allergies indicates:   Allergen Reactions    Escitalopram oxalate     Cymbalta [duloxetine] Rash     Red rashes on arms and chest    Lamisil [terbinafine hcl] Rash       RELEVANT LABS/STUDIES:    Lab Results   Component Value Date    WBC 7.11 04/26/2022    WBC 7.11 04/26/2022    HGB 15.3 04/26/2022    HGB 15.3 04/26/2022    HCT 47.9 04/26/2022    HCT 47.9  04/26/2022    MCV 92 04/26/2022    MCV 92 04/26/2022     04/26/2022     04/26/2022     BMP  Lab Results   Component Value Date     04/26/2022     04/26/2022    K 4.2 04/26/2022    K 4.2 04/26/2022     04/26/2022     04/26/2022    CO2 27 04/26/2022    CO2 27 04/26/2022    BUN 12 04/26/2022    BUN 12 04/26/2022    CREATININE 1.0 04/26/2022    CREATININE 1.0 04/26/2022    CALCIUM 9.6 04/26/2022    CALCIUM 9.6 04/26/2022    ANIONGAP 10 04/26/2022    ANIONGAP 10 04/26/2022    ESTGFRAFRICA >60.0 04/26/2022    ESTGFRAFRICA >60.0 04/26/2022    EGFRNONAA >60.0 04/26/2022    EGFRNONAA >60.0 04/26/2022     Lab Results   Component Value Date    ALT 31 04/26/2022    ALT 31 04/26/2022    AST 26 04/26/2022    AST 26 04/26/2022    ALKPHOS 54 (L) 04/26/2022    ALKPHOS 54 (L) 04/26/2022    BILITOT 0.7 04/26/2022    BILITOT 0.7 04/26/2022     Lab Results   Component Value Date    TSH 1.481 12/14/2017     No results found for: LABA1C, HGBA1C    Constitutional  Vitals:  Most recent vital signs, dated less than 90 days prior to this appointment, were reviewed.    Vitals:    09/27/22 0900   BP: (!) 143/89   Pulse: 104   Weight: 76.5 kg (168 lb 12.2 oz)              PHYSICAL EXAM  General: well developed, well nourished  Neurologic:   Gait: Normal   Psychomotor signs:  No involuntary movements or tremor  AIMS: none    PSYCHIATRIC EXAM:     Mental Status Exam:  Appearance: unremarkable, age appropriate  Behavior/Cooperation: normal, cooperative  Speech: normal tone, normal rate, normal pitch, normal volume  Language: uses words appropriately; NO aphasia or dysarthria  Mood: steady, happy  Affect - much improved   Thought Process: normal and logical  Thought Content: normal, no suicidality, no homicidality, delusions, or paranoia  Level of Consciousness: Alert and Oriented x3  Memory:  Intact  Attention/concentration: appropriate for age/education.   Fund of Knowledge: appears adequate  Insight:  Good    Judgment: good      Assessment and Diagnosis   Status/Progress: Based on the examination today, the patient's problem(s) is/are improved.  New problems have been presented today.   Co-morbidities, Diagnostic uncertainty and Lack of compliance are not complicating management of the primary condition.  Patient with significant response to increased dose of sertraline resulting in robust symptoms improvement. Will continue current regimen. Prognosis is good going forwards.     General Impression:       ICD-10-CM ICD-9-CM   1. Moderate episode of recurrent major depressive disorder  F33.1 296.32   2. Insomnia, unspecified type  G47.00 780.52         Intervention/Counseling/Treatment Plan   Medication Management:    Continue sertraline 100mg daily   Start Remeron 7.5mg QHS insomnia. If ineffective can try trazodone 50mg QHS     Labs: reviewed most recent  The treatment plan and follow up plan were reviewed with the patient.  Discussed with patient informed consent, risks vs. benefits, alternative treatments, side effect profile and the inherent unpredictability of individual responses to these treatments. The patient expresses understanding of the above and displays the capacity to agree with this current plan and had no other questions.  Encouraged Patient to keep future appointments.   Take medications as prescribed and abstain from substance abuse.   In the event of an emergency patient was advised to go to the emergency room.    Return to Clinic: 3 months    > than 50% of total time spend on coordination of care and counseling   (which included pts differential diagnosis and prognosis for psychiatric conditions, risks, benefits of treatments, instructions and adherence to treatment plan, risk reduction, reviewing current psychiatric medication regimen, medical problems and social stressors. In addtion to possible discussion with other healthcare provider/s)    Add on Psychotherapy time:0  Total Face time:20 minutes      Juan Garcia MD  [facility] PGY-3  [facility]

## 2022-10-05 NOTE — PROGRESS NOTES
STAFF COMMENTS: I have discussed pt with Dr. Garcia and reviewed the history and exam. I agree and concur with the assessment and plan.

## 2022-10-13 ENCOUNTER — LAB VISIT (OUTPATIENT)
Dept: LAB | Facility: HOSPITAL | Age: 57
End: 2022-10-13
Attending: INTERNAL MEDICINE
Payer: MEDICARE

## 2022-10-13 ENCOUNTER — OFFICE VISIT (OUTPATIENT)
Dept: RHEUMATOLOGY | Facility: CLINIC | Age: 57
End: 2022-10-13
Payer: MEDICARE

## 2022-10-13 VITALS
BODY MASS INDEX: 34.88 KG/M2 | HEART RATE: 83 BPM | HEIGHT: 67 IN | DIASTOLIC BLOOD PRESSURE: 95 MMHG | SYSTOLIC BLOOD PRESSURE: 147 MMHG | WEIGHT: 222.25 LBS

## 2022-10-13 DIAGNOSIS — M35.00 SJOGREN'S SYNDROME, WITH UNSPECIFIED ORGAN INVOLVEMENT: ICD-10-CM

## 2022-10-13 DIAGNOSIS — M35.00 SJOGREN'S SYNDROME, WITH UNSPECIFIED ORGAN INVOLVEMENT: Primary | ICD-10-CM

## 2022-10-13 LAB
ALBUMIN SERPL BCP-MCNC: 4.3 G/DL (ref 3.5–5.2)
ALP SERPL-CCNC: 53 U/L (ref 55–135)
ALT SERPL W/O P-5'-P-CCNC: 34 U/L (ref 10–44)
ANION GAP SERPL CALC-SCNC: 8 MMOL/L (ref 8–16)
AST SERPL-CCNC: 24 U/L (ref 10–40)
BASOPHILS # BLD AUTO: 0.09 K/UL (ref 0–0.2)
BASOPHILS NFR BLD: 1.2 % (ref 0–1.9)
BILIRUB SERPL-MCNC: 0.4 MG/DL (ref 0.1–1)
BUN SERPL-MCNC: 12 MG/DL (ref 6–20)
C3 SERPL-MCNC: 182 MG/DL (ref 50–180)
C4 SERPL-MCNC: 36 MG/DL (ref 11–44)
CALCIUM SERPL-MCNC: 9.7 MG/DL (ref 8.7–10.5)
CHLORIDE SERPL-SCNC: 109 MMOL/L (ref 95–110)
CO2 SERPL-SCNC: 29 MMOL/L (ref 23–29)
CREAT SERPL-MCNC: 0.9 MG/DL (ref 0.5–1.4)
CRP SERPL-MCNC: 1.5 MG/L (ref 0–8.2)
DIFFERENTIAL METHOD: NORMAL
EOSINOPHIL # BLD AUTO: 0.3 K/UL (ref 0–0.5)
EOSINOPHIL NFR BLD: 4 % (ref 0–8)
ERYTHROCYTE [DISTWIDTH] IN BLOOD BY AUTOMATED COUNT: 13 % (ref 11.5–14.5)
ERYTHROCYTE [SEDIMENTATION RATE] IN BLOOD BY PHOTOMETRIC METHOD: 14 MM/HR (ref 0–23)
EST. GFR  (NO RACE VARIABLE): >60 ML/MIN/1.73 M^2
GLUCOSE SERPL-MCNC: 114 MG/DL (ref 70–110)
HCT VFR BLD AUTO: 44.9 % (ref 40–54)
HGB BLD-MCNC: 14.9 G/DL (ref 14–18)
IMM GRANULOCYTES # BLD AUTO: 0.03 K/UL (ref 0–0.04)
IMM GRANULOCYTES NFR BLD AUTO: 0.4 % (ref 0–0.5)
LYMPHOCYTES # BLD AUTO: 1.9 K/UL (ref 1–4.8)
LYMPHOCYTES NFR BLD: 24.5 % (ref 18–48)
MCH RBC QN AUTO: 29.9 PG (ref 27–31)
MCHC RBC AUTO-ENTMCNC: 33.2 G/DL (ref 32–36)
MCV RBC AUTO: 90 FL (ref 82–98)
MONOCYTES # BLD AUTO: 0.7 K/UL (ref 0.3–1)
MONOCYTES NFR BLD: 9.2 % (ref 4–15)
NEUTROPHILS # BLD AUTO: 4.6 K/UL (ref 1.8–7.7)
NEUTROPHILS NFR BLD: 60.7 % (ref 38–73)
NRBC BLD-RTO: 0 /100 WBC
PLATELET # BLD AUTO: 211 K/UL (ref 150–450)
PMV BLD AUTO: 10.2 FL (ref 9.2–12.9)
POTASSIUM SERPL-SCNC: 4.3 MMOL/L (ref 3.5–5.1)
PROT SERPL-MCNC: 7.3 G/DL (ref 6–8.4)
RBC # BLD AUTO: 4.98 M/UL (ref 4.6–6.2)
SODIUM SERPL-SCNC: 146 MMOL/L (ref 136–145)
WBC # BLD AUTO: 7.58 K/UL (ref 3.9–12.7)

## 2022-10-13 PROCEDURE — 99213 OFFICE O/P EST LOW 20 MIN: CPT | Mod: PBBFAC | Performed by: INTERNAL MEDICINE

## 2022-10-13 PROCEDURE — 86160 COMPLEMENT ANTIGEN: CPT | Performed by: INTERNAL MEDICINE

## 2022-10-13 PROCEDURE — 80053 COMPREHEN METABOLIC PANEL: CPT | Performed by: INTERNAL MEDICINE

## 2022-10-13 PROCEDURE — 99214 PR OFFICE/OUTPT VISIT, EST, LEVL IV, 30-39 MIN: ICD-10-PCS | Mod: S$PBB,,, | Performed by: INTERNAL MEDICINE

## 2022-10-13 PROCEDURE — 99999 PR PBB SHADOW E&M-EST. PATIENT-LVL III: ICD-10-PCS | Mod: PBBFAC,,, | Performed by: INTERNAL MEDICINE

## 2022-10-13 PROCEDURE — 85652 RBC SED RATE AUTOMATED: CPT | Performed by: INTERNAL MEDICINE

## 2022-10-13 PROCEDURE — 99999 PR PBB SHADOW E&M-EST. PATIENT-LVL III: CPT | Mod: PBBFAC,,, | Performed by: INTERNAL MEDICINE

## 2022-10-13 PROCEDURE — 85025 COMPLETE CBC W/AUTO DIFF WBC: CPT | Performed by: INTERNAL MEDICINE

## 2022-10-13 PROCEDURE — 86140 C-REACTIVE PROTEIN: CPT | Performed by: INTERNAL MEDICINE

## 2022-10-13 PROCEDURE — 36415 COLL VENOUS BLD VENIPUNCTURE: CPT | Performed by: INTERNAL MEDICINE

## 2022-10-13 PROCEDURE — 86225 DNA ANTIBODY NATIVE: CPT | Performed by: INTERNAL MEDICINE

## 2022-10-13 PROCEDURE — 86160 COMPLEMENT ANTIGEN: CPT | Mod: 59 | Performed by: INTERNAL MEDICINE

## 2022-10-13 PROCEDURE — 99214 OFFICE O/P EST MOD 30 MIN: CPT | Mod: S$PBB,,, | Performed by: INTERNAL MEDICINE

## 2022-10-13 RX ORDER — CYCLOBENZAPRINE HCL 5 MG
TABLET ORAL
Qty: 15 TABLET | Refills: 5 | Status: SHIPPED | OUTPATIENT
Start: 2022-10-13 | End: 2023-04-24

## 2022-10-13 RX ORDER — METHOTREXATE 2.5 MG/1
TABLET ORAL
Qty: 25 TABLET | Refills: 5 | Status: SHIPPED | OUTPATIENT
Start: 2022-10-13 | End: 2023-04-24

## 2022-10-13 RX ORDER — FOLIC ACID 1 MG/1
1 TABLET ORAL DAILY
Qty: 30 TABLET | Refills: 3 | Status: SHIPPED | OUTPATIENT
Start: 2022-10-13 | End: 2023-04-24

## 2022-10-13 RX ORDER — ROSUVASTATIN CALCIUM 10 MG/1
10 TABLET, COATED ORAL DAILY
Qty: 90 TABLET | Refills: 3 | Status: SHIPPED | OUTPATIENT
Start: 2022-10-13 | End: 2023-04-24
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
## 2022-10-13 NOTE — PROGRESS NOTES
Rapid3 Question Responses and Scores 10/13/2022   MDHAQ Score 0.9   Psychologic Score 3.3   Pain Score 7.5   When you awakened in the morning OVER THE LAST WEEK, did you feel stiff? Yes   If Yes, please indicate the number of hours until you are as limber as you will be for the day 3   Fatigue Score 5   Global Health Score 3   RAPID3 Score 4.5     Answers submitted by the patient for this visit:  Rheumatology Questionnaire (Submitted on 10/13/2022)  fever: No  eye redness: No  mouth sores: No  headaches: No  shortness of breath: No  chest pain: No  trouble swallowing: No  diarrhea: No  constipation: No  unexpected weight change: No  genital sore: No  During the last 3 days, have you had a skin rash?: No  Bruises or bleeds easily: No  cough: No

## 2022-10-14 LAB — DSDNA AB SER-ACNC: NORMAL [IU]/ML

## 2022-10-17 ENCOUNTER — PATIENT MESSAGE (OUTPATIENT)
Dept: RHEUMATOLOGY | Facility: CLINIC | Age: 57
End: 2022-10-17
Payer: MEDICARE

## 2022-11-15 ENCOUNTER — PATIENT MESSAGE (OUTPATIENT)
Dept: RHEUMATOLOGY | Facility: CLINIC | Age: 57
End: 2022-11-15
Payer: MEDICARE

## 2022-12-08 ENCOUNTER — OFFICE VISIT (OUTPATIENT)
Dept: DERMATOLOGY | Facility: CLINIC | Age: 57
End: 2022-12-08
Payer: MEDICARE

## 2022-12-08 DIAGNOSIS — L82.1 SK (SEBORRHEIC KERATOSIS): ICD-10-CM

## 2022-12-08 DIAGNOSIS — L82.0 INFLAMED SEBORRHEIC KERATOSIS: Primary | ICD-10-CM

## 2022-12-08 PROCEDURE — 99213 OFFICE O/P EST LOW 20 MIN: CPT | Mod: 25,S$PBB,, | Performed by: DERMATOLOGY

## 2022-12-08 PROCEDURE — 17110 PR DESTRUCTION BENIGN LESIONS UP TO 14: ICD-10-PCS | Mod: S$PBB,,, | Performed by: DERMATOLOGY

## 2022-12-08 PROCEDURE — 17110 DESTRUCTION B9 LES UP TO 14: CPT | Mod: PBBFAC,PO | Performed by: DERMATOLOGY

## 2022-12-08 PROCEDURE — 99213 PR OFFICE/OUTPT VISIT, EST, LEVL III, 20-29 MIN: ICD-10-PCS | Mod: 25,S$PBB,, | Performed by: DERMATOLOGY

## 2022-12-08 PROCEDURE — 99999 PR PBB SHADOW E&M-EST. PATIENT-LVL II: ICD-10-PCS | Mod: PBBFAC,,, | Performed by: DERMATOLOGY

## 2022-12-08 PROCEDURE — 99999 PR PBB SHADOW E&M-EST. PATIENT-LVL II: CPT | Mod: PBBFAC,,, | Performed by: DERMATOLOGY

## 2022-12-08 PROCEDURE — 99212 OFFICE O/P EST SF 10 MIN: CPT | Mod: PBBFAC,PO | Performed by: DERMATOLOGY

## 2022-12-08 PROCEDURE — 17110 DESTRUCTION B9 LES UP TO 14: CPT | Mod: S$PBB,,, | Performed by: DERMATOLOGY

## 2022-12-08 NOTE — PROGRESS NOTES
Subjective:       Patient ID:  Van Jones is a 57 y.o. male who presents for   Chief Complaint   Patient presents with    Lesion     face     History of Present Illness: The patient presents for follow up of skin check.    The patient was last seen on: 09/30/2020 [place]. [place] [facility] [place]  No h/o nmsc    Other skin complaints: multiple spots on face - denies bleeding or growing lesions    Review of Systems   Skin:  Positive for activity-related sunscreen use and wears hat. Negative for daily sunscreen use.      Objective:    Physical Exam   Constitutional: He appears well-developed and well-nourished. No distress.   Neurological: He is alert and oriented to person, place, and time. He is not disoriented.   Psychiatric: He has a normal mood and affect.   Skin:   Areas Examined (abnormalities noted in diagram):   Head / Face Inspection Performed  Neck Inspection Performed            Diagram Legend     Erythematous scaling macule/papule c/w actinic keratosis       Vascular papule c/w angioma      Pigmented verrucoid papule/plaque c/w seborrheic keratosis      Yellow umbilicated papule c/w sebaceous hyperplasia      Irregularly shaped tan macule c/w lentigo     1-2 mm smooth white papules consistent with Milia      Movable subcutaneous cyst with punctum c/w epidermal inclusion cyst      Subcutaneous movable cyst c/w pilar cyst      Firm pink to brown papule c/w dermatofibroma      Pedunculated fleshy papule(s) c/w skin tag(s)      Evenly pigmented macule c/w junctional nevus     Mildly variegated pigmented, slightly irregular-bordered macule c/w mildly atypical nevus      Flesh colored to evenly pigmented papule c/w intradermal nevus       Pink pearly papule/plaque c/w basal cell carcinoma      Erythematous hyperkeratotic cursted plaque c/w SCC      Surgical scar with no sign of skin cancer recurrence      Open and closed comedones      Inflammatory papules and pustules      Verrucoid papule consistent  consistent with wart     Erythematous eczematous patches and plaques     Dystrophic onycholytic nail with subungual debris c/w onychomycosis     Umbilicated papule    Erythematous-base heme-crusted tan verrucoid plaque consistent with inflamed seborrheic keratosis     Erythematous Silvery Scaling Plaque c/w Psoriasis     See annotation      Assessment / Plan:        Inflamed seborrheic keratosis  Cryosurgery procedure note:    Verbal consent from the patient is obtained including, but not limited to, risk of hypopigmentation/hyperpigmentation, scar, recurrence of lesion. Liquid nitrogen cryosurgery is applied to 3 lesions to produce a freeze injury. The patient is aware that blisters may form and is instructed on wound care with gentle cleansing and use of vaseline ointment to keep moist until healed. The patient is supplied a handout on cryosurgery and is instructed to call if lesions do not completely resolve.    Discussed with patient that this procedure may not be covered by insurance as it can be considered cosmetic and pt would like to pursue treatment.  He/she understands that he/she  may be responsible for the bill and agrees to pay.     SK (seborrheic keratosis)  These are benign inherited growths without a malignant potential. Reassurance given to patient. No treatment is necessary.     The nature of sun-induced photo-aging and skin cancers is discussed.  Sun avoidance, protective clothing, and the use of 30-SPF sunscreens is advised. Observe closely for skin damage/changes, and call if such occurs.  Neutrogena ultrasheer dry touch or elta sport            Follow up if symptoms worsen or fail to improve.

## 2023-01-03 ENCOUNTER — PATIENT MESSAGE (OUTPATIENT)
Dept: RHEUMATOLOGY | Facility: CLINIC | Age: 58
End: 2023-01-03
Payer: MEDICARE

## 2023-01-03 ENCOUNTER — PATIENT MESSAGE (OUTPATIENT)
Dept: ADMINISTRATIVE | Facility: OTHER | Age: 58
End: 2023-01-03
Payer: MEDICARE

## 2023-01-04 ENCOUNTER — NURSE TRIAGE (OUTPATIENT)
Dept: ADMINISTRATIVE | Facility: CLINIC | Age: 58
End: 2023-01-04
Payer: MEDICARE

## 2023-01-05 NOTE — TELEPHONE ENCOUNTER
[facility] call -     Pt c/o bad HA, scratchy throat, afebrile, covid positive, denies double vision, dizziness, cp, weakness or facial drooping. Covid protocol followed and pt advised to see a telemed doctor within 24 hours or go to an uc/er. Pt educated on if he starts to experience any double vision, weakness or facial drooping he is to call 911 instead. Pt states he can touch his chin to his chest without any additional pain. [facility] offered and accepted. Alert and oriented, Pt educated on tylenol use to tx head ache but he is to follow up with seeing a doctor within 24 hours and if he gets worse to call ooc at [phone] for further advice or call 911 or go to the nearest ER if experiencing a medical emergency. Pcp listed is not a valid candidate for routing encounter to at this time.   Reason for Disposition   [1] HIGH RISK for severe COVID complications (e.g., weak immune system, age > 64 years, obesity with BMI 30 or higher, pregnant, chronic lung disease or other chronic medical condition) AND [2] COVID symptoms (e.g., cough, fever)  (Exceptions: Already seen by PCP and no new or worsening symptoms.)    Additional Information   Negative: SEVERE difficulty breathing (e.g., struggling for each breath, speaks in single words)   Negative: Difficult to awaken or acting confused (e.g., disoriented, slurred speech)   Negative: Bluish (or gray) lips or face now   Negative: Shock suspected (e.g., cold/pale/clammy skin, too weak to stand, low BP, rapid pulse)   Negative: Sounds like a life-threatening emergency to the triager   Negative: SEVERE or constant chest pain or pressure  (Exception: Mild central chest pain, present only when coughing.)   Negative: MODERATE difficulty breathing (e.g., speaks in phrases, SOB even at rest, pulse 100-120)   Negative: [1] Headache AND [2] stiff neck (can't touch chin to chest)   Negative: Chest pain or pressure  (Exception: MILD central chest pain, present only when coughing)    Negative: Patient sounds very sick or weak to the triager   Negative: MILD difficulty breathing (e.g., minimal/no SOB at rest, SOB with walking, pulse <100)   Negative: Fever > 103 F (39.4 C)   Negative: [1] Fever > 101 F (38.3 C) AND [2] age > 60 years   Negative: [1] Fever > 100.0 F (37.8 C) AND [2] bedridden (e.g., CVA, chronic illness, recovering from surgery)    Protocols used: Coronavirus (COVID-19) Diagnosed or [id number]

## 2023-04-24 ENCOUNTER — OFFICE VISIT (OUTPATIENT)
Dept: RHEUMATOLOGY | Facility: CLINIC | Age: 58
End: 2023-04-24
Payer: MEDICARE

## 2023-04-24 ENCOUNTER — HOSPITAL ENCOUNTER (OUTPATIENT)
Dept: RADIOLOGY | Facility: HOSPITAL | Age: 58
Discharge: HOME | End: 2023-04-24
Attending: INTERNAL MEDICINE
Payer: MEDICARE

## 2023-04-24 VITALS
WEIGHT: 222.69 LBS | BODY MASS INDEX: 34.95 KG/M2 | HEART RATE: 89 BPM | SYSTOLIC BLOOD PRESSURE: 137 MMHG | DIASTOLIC BLOOD PRESSURE: 79 MMHG | HEIGHT: 67 IN

## 2023-04-24 DIAGNOSIS — M54.2 NECK PAIN: ICD-10-CM

## 2023-04-24 DIAGNOSIS — M47.812 SPONDYLOSIS OF CERVICAL REGION WITHOUT MYELOPATHY OR RADICULOPATHY: ICD-10-CM

## 2023-04-24 DIAGNOSIS — F33.1 MODERATE EPISODE OF RECURRENT MAJOR DEPRESSIVE DISORDER: ICD-10-CM

## 2023-04-24 DIAGNOSIS — Z13.820 SCREENING FOR OSTEOPOROSIS: ICD-10-CM

## 2023-04-24 DIAGNOSIS — M35.00 SJOGREN'S SYNDROME, WITH UNSPECIFIED ORGAN INVOLVEMENT: Primary | ICD-10-CM

## 2023-04-24 PROCEDURE — 72040 X-RAY EXAM NECK SPINE 2-3 VW: CPT | Mod: 26,,, | Performed by: RADIOLOGY

## 2023-04-24 PROCEDURE — 72040 XR CERVICAL SPINE FLEXION  AND EXTENSION ONLY: ICD-10-PCS | Mod: 26,,, | Performed by: RADIOLOGY

## 2023-04-24 PROCEDURE — 99214 OFFICE O/P EST MOD 30 MIN: CPT | Mod: S$PBB,,, | Performed by: INTERNAL MEDICINE

## 2023-04-24 PROCEDURE — 99214 PR OFFICE/OUTPT VISIT, EST, LEVL IV, 30-39 MIN: ICD-10-PCS | Mod: S$PBB,,, | Performed by: INTERNAL MEDICINE

## 2023-04-24 PROCEDURE — 99999 PR PBB SHADOW E&M-EST. PATIENT-LVL III: CPT | Mod: PBBFAC,,, | Performed by: INTERNAL MEDICINE

## 2023-04-24 PROCEDURE — 99999 PR PBB SHADOW E&M-EST. PATIENT-LVL III: ICD-10-PCS | Mod: PBBFAC,,, | Performed by: INTERNAL MEDICINE

## 2023-04-24 PROCEDURE — 72040 X-RAY EXAM NECK SPINE 2-3 VW: CPT | Mod: TC

## 2023-04-24 PROCEDURE — 99213 OFFICE O/P EST LOW 20 MIN: CPT | Mod: PBBFAC | Performed by: INTERNAL MEDICINE

## 2023-04-24 RX ORDER — CYCLOBENZAPRINE HCL 5 MG
TABLET ORAL
Qty: 15 TABLET | Refills: 5 | Status: SHIPPED | OUTPATIENT
Start: 2023-04-24 | End: 2023-11-04

## 2023-04-24 RX ORDER — SERTRALINE HYDROCHLORIDE 100 MG/1
100 TABLET, FILM COATED ORAL DAILY
Qty: 90 TABLET | Refills: 1 | Status: SHIPPED | OUTPATIENT
Start: 2023-04-24 | End: 2023-11-29 | Stop reason: SDUPTHER

## 2023-04-24 RX ORDER — ROSUVASTATIN CALCIUM 10 MG/1
10 TABLET, COATED ORAL DAILY
Qty: 90 TABLET | Refills: 1 | Status: SHIPPED | OUTPATIENT
Start: 2023-04-24 | End: 2023-10-04 | Stop reason: SDUPTHER

## 2023-04-24 RX ORDER — METHOTREXATE 2.5 MG/1
TABLET ORAL
Qty: 75 TABLET | Refills: 1 | Status: SHIPPED | OUTPATIENT
Start: 2023-04-24 | End: 2023-11-28

## 2023-04-24 RX ORDER — FOLIC ACID 1 MG/1
1 TABLET ORAL DAILY
Qty: 90 TABLET | Refills: 1 | Status: SHIPPED | OUTPATIENT
Start: 2023-04-24 | End: 2023-05-18

## 2023-04-24 NOTE — PROGRESS NOTES
Rapid3 Question Responses and Scores 4/24/2023   MDHAQ Score 0.3   Psychologic Score 1.1   Pain Score 8   When you awakened in the morning OVER THE LAST WEEK, did you feel stiff? Yes   If Yes, please indicate the number of hours until you are as limber as you will be for the day 4   Fatigue Score 8   Global Health Score 6.5   RAPID3 Score 5.17     Answers submitted by the patient for this visit:  Rheumatology Questionnaire (Submitted on 4/24/2023)  fever: No  eye redness: No  mouth sores: No  headaches: No  shortness of breath: No  chest pain: No  trouble swallowing: No  diarrhea: No  constipation: No  unexpected weight change: No  genital sore: No  During the last 3 days, have you had a skin rash?: No  Bruises or bleeds easily: No  cough: No
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
## 2023-05-06 ENCOUNTER — PATIENT MESSAGE (OUTPATIENT)
Dept: RHEUMATOLOGY | Facility: CLINIC | Age: 58
End: 2023-05-06
Payer: MEDICARE

## 2023-05-07 RX ORDER — METAXALONE 400 MG/1
TABLET ORAL
Qty: 60 TABLET | Refills: 0 | Status: SHIPPED | OUTPATIENT
Start: 2023-05-07 | End: 2023-07-10

## 2023-05-18 RX ORDER — FOLIC ACID 1 MG/1
TABLET ORAL
Qty: 30 TABLET | Refills: 1 | Status: SHIPPED | OUTPATIENT
Start: 2023-05-18 | End: 2023-07-17

## 2023-05-23 ENCOUNTER — CLINICAL SUPPORT (OUTPATIENT)
Dept: REHABILITATION | Facility: HOSPITAL | Age: 58
End: 2023-05-23
Attending: INTERNAL MEDICINE
Payer: MEDICARE

## 2023-05-23 DIAGNOSIS — M54.2 NECK PAIN: ICD-10-CM

## 2023-05-23 DIAGNOSIS — M47.812 SPONDYLOSIS OF CERVICAL REGION WITHOUT MYELOPATHY OR RADICULOPATHY: ICD-10-CM

## 2023-05-23 PROCEDURE — 97161 PT EVAL LOW COMPLEX 20 MIN: CPT

## 2023-05-23 PROCEDURE — 97112 NEUROMUSCULAR REEDUCATION: CPT
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
## 2023-06-01 NOTE — PROGRESS NOTES
"[facility]   Physical Therapy Treatment Note      [id number]    Therapy Diagnosis:   Encounter Diagnosis   Name Primary?    Neck pain Yes     Physician: Garrett Go*    Visit Date: 6/2/2023    Physician Orders: PT Eval and Treat   Medical Diagnosis from Referral:   M54.2 (ICD-10-CM) - Neck pain   M47.812 (ICD-10-CM) - Spondylosis of cervical region without myelopathy or radiculopathy      Evaluation Date: 5/23/2023  Authorization Period Expiration: 4/23/24  Plan of Care Expiration: 7/7/23  Progress Note Due: 6/23/23  Visit # / Visits authorized: 1/ 1   FOTO: 1     Precautions: Standard     PTA Visit #: 1/5     Time In: 10:00 am   Time Out: 10:40 am   Total Billable Time: 40 minutes    Subjective     Pt reports: that he isn't having any pain today but continues with stiffness and difficulty turning to the left.   He was compliant with home exercise program.  Response to previous treatment: last session was initial eval  Functional change: none at this time     Pain: 0/10  Location: n/a     Objective      Objective Measures updated at progress report unless specified.     Treatment     Van received the treatments listed below:       therapeutic exercises to develop ROM and flexibility for 8 minutes including:  Cervical rotation stretch 5x10"    UBE backwards 3 min      neuromuscular re-education activities to improve:motor control, Posture for 23 minutes. The following activities were included:  Chin tucks 1x10 3" seated, 1x10 supine  (B) UE ER w/scap retraction w/RTB 2x10   Rows on CC 20lb 3x10    Supine HSA RTB 3x10   Serratus punches 3lb 3x10   Prone      - Field goals 2x10      - modified lower trap 2x10        manual therapy techniques:  were applied to the: cervical paraspinals for 9 minutes, including:  STM michael cervical paraspinals and UT           Patient Education and Home Exercises       Education provided:   - HEP    Written " Home Exercises Provided: Patient instructed to cont prior HEP. Exercises were reviewed and Van was able to demonstrate them prior to the end of the session.  Van demonstrated good  understanding of the education provided. See EMR under Patient Instructions for exercises provided during therapy sessions    Assessment     Initiated therex program following pt's initial evaluation with no adverse effects. Pt required verbal cues throughout session, most notably during NMR, for improved scapular setting. STM revealed hypertonicity along bilateral upper traps and cervical paraspinals, with patient noted increased cervical mobility following session. Will continue to progress pt per his tolerance and POC.     Van Is progressing well towards his goals.   Pt prognosis is Good.     Pt will continue to benefit from skilled outpatient physical therapy to address the deficits listed in the problem list box on initial evaluation, provide pt/family education and to maximize pt's level of independence in the home and community environment.     Pt's spiritual, cultural and educational needs considered and pt agreeable to plan of care and goals.     Anticipated barriers to physical therapy: chronicity of pain      Goals: Short Term Goals: 2 weeks   Pt will be compliant with HEP to supplement PT with decreasing pain and improving functional mobility     Long Term Goals: 6 weeks   Pt will improve FOTO score to </= 35% limited in order to demo improved functional mobility  Pt will demo (L) cervical rotation AROM = (R) cervical rotation AROM in order to improve ability to turn head when driving  Pt will report neck pain </=4/10 at worst    Plan     Plan of care Certification: 5/23/2023 to 7/7/23.     Ailin Pablo, PTA

## 2023-06-02 ENCOUNTER — CLINICAL SUPPORT (OUTPATIENT)
Dept: REHABILITATION | Facility: HOSPITAL | Age: 58
End: 2023-06-02
Attending: INTERNAL MEDICINE
Payer: MEDICARE

## 2023-06-02 DIAGNOSIS — M54.2 NECK PAIN: Primary | ICD-10-CM

## 2023-06-02 PROCEDURE — 97112 NEUROMUSCULAR REEDUCATION: CPT | Mod: CQ

## 2023-06-02 PROCEDURE — 97110 THERAPEUTIC EXERCISES: CPT | Mod: CQ

## 2023-06-05 NOTE — PROGRESS NOTES
"[facility]   Physical Therapy Treatment Note      Name: Van Jones  [facility] Number: [id number]    Therapy Diagnosis:   Encounter Diagnosis   Name Primary?    Neck pain Yes       Physician: Garrett Go*    Visit Date: 6/6/2023    Physician Orders: PT Eval and Treat   Medical Diagnosis from Referral:   M54.2 (ICD-10-CM) - Neck pain   M47.812 (ICD-10-CM) - Spondylosis of cervical region without myelopathy or radiculopathy      Evaluation Date: 5/23/2023  Authorization Period Expiration: 4/23/24  Plan of Care Expiration: 7/7/23  Progress Note Due: 6/23/23  Visit # / Visits authorized: 1/ 1, 1/20  FOTO: 2     Precautions: Standard     PTA Visit #: 2/5     Time In: 9:47 am   Time Out: 10:34 am   Total Billable Time: 47 minutes    Subjective     Pt reports: that his neck has been feeling pretty good with no indications of pain.   He was compliant with home exercise program.  Response to previous treatment: no adverse effects   Functional change: none at this time     Pain: 0/10  Location: n/a     Objective      Objective Measures updated at progress report unless specified.     Treatment     Van received the treatments listed below:       therapeutic exercises to develop ROM and flexibility for 8 minutes including:  Cervical rotation stretch 5x10"    UBE fwd/backwards 3min/3 min     neuromuscular re-education activities to improve:motor control, Posture for 29 minutes. The following activities were included:  Chin tucks 1x10 3" seated, 1x10 supine  (B) UE ER w/scap retraction w/RTB 2x10   Rows on CC 20lb (10 lb 3x10    Supine HSA RTB 3x10   Serratus punches 3lb 3x10   Prone      - Field goals 2x10      - modified lower trap 2x10   Deep neck flexor activation 10x 3"        manual therapy techniques:  were applied to the: cervical paraspinals for 10 minutes, including:  STM michael cervical paraspinals and UT (L>R)           Patient Education and Home Exercises       Education " provided:   - HEP    Written Home Exercises Provided: Patient instructed to cont prior HEP. Exercises were reviewed and Van was able to demonstrate them prior to the end of the session.  Van demonstrated good  understanding of the education provided. See EMR under Patient Instructions for exercises provided during therapy sessions    Assessment     Progressed UE strengthening and NMR this session with the addition/modification of activities. Incorporated forward UBE in addition of backwards UBE to further encourage UE strength and endurance. Pt required tactile cues during serratus punches to reduce elbow flexion, as well as during DNF activation in order to ensure proper form without compensation. Continued with manual therapy techniques to reduce bilateral hypertonicity, however focused on (L) side more than (R). Will consider progressive manual therapy techniques to aid in increased (L) tissue extensibility and ROM.     Van Is progressing well towards his goals.   Pt prognosis is Good.     Pt will continue to benefit from skilled outpatient physical therapy to address the deficits listed in the problem list box on initial evaluation, provide pt/family education and to maximize pt's level of independence in the home and community environment.     Pt's spiritual, cultural and educational needs considered and pt agreeable to plan of care and goals.     Anticipated barriers to physical therapy: chronicity of pain      Goals: Short Term Goals: 2 weeks   Pt will be compliant with HEP to supplement PT with decreasing pain and improving functional mobility     Long Term Goals: 6 weeks   Pt will improve FOTO score to </= 35% limited in order to demo improved functional mobility  Pt will demo (L) cervical rotation AROM = (R) cervical rotation AROM in order to improve ability to turn head when driving  Pt will report neck pain </=4/10 at worst    Plan     Plan of care Certification: 5/23/2023 to 7/7/23.     Ailin  Samy, EDITH

## 2023-06-06 ENCOUNTER — CLINICAL SUPPORT (OUTPATIENT)
Dept: REHABILITATION | Facility: HOSPITAL | Age: 58
End: 2023-06-06
Attending: INTERNAL MEDICINE
Payer: MEDICARE

## 2023-06-06 DIAGNOSIS — M54.2 NECK PAIN: Primary | ICD-10-CM

## 2023-06-06 PROCEDURE — 97112 NEUROMUSCULAR REEDUCATION: CPT | Mod: CQ

## 2023-06-06 PROCEDURE — 97110 THERAPEUTIC EXERCISES: CPT | Mod: CQ

## 2023-06-09 ENCOUNTER — CLINICAL SUPPORT (OUTPATIENT)
Dept: REHABILITATION | Facility: HOSPITAL | Age: 58
End: 2023-06-09
Attending: INTERNAL MEDICINE
Payer: MEDICARE

## 2023-06-09 DIAGNOSIS — M54.2 NECK PAIN: Primary | ICD-10-CM

## 2023-06-09 PROCEDURE — 97110 THERAPEUTIC EXERCISES: CPT

## 2023-06-09 PROCEDURE — 97140 MANUAL THERAPY 1/> REGIONS: CPT

## 2023-06-09 PROCEDURE — 97112 NEUROMUSCULAR REEDUCATION: CPT

## 2023-06-09 NOTE — PROGRESS NOTES
"  Physical Therapy Daily Treatment Note     [facility]  [facility] Number[facility] [id number]    Therapy Diagnosis:   Encounter Diagnosis   Name Primary?    Neck pain Yes     Physician: Garrett Go*    Visit Date: 6/9/2023    Physician Orders: PT Eval and Treat   Medical Diagnosis from Referral:   M54.2 (ICD-10-CM) - Neck pain   M47.812 (ICD-10-CM) - Spondylosis of cervical region without myelopathy or radiculopathy      Evaluation Date: 5/23/2023  Authorization Period Expiration: 4/23/24  Plan of Care Expiration: 7/7/23  Progress Note Due: 6/23/23  Visit # / Visits authorized: 1/ 1, 2/20  FOTO: 3    Time In: 10:04 am   Time Out: 10:47 am  Total Billable Time: 39 minutes    Precautions: Standard    Subjective     Pt reports: that mobility of his neck is getting better, but pain does not seem to be changing.  He was compliant with home exercise program.  Response to previous treatment: no adverse effects  Functional change: pt stated that driving is a little easier looking (L)     Pain: 7/10 neck       Objective     Van received the treatments listed below:       therapeutic exercises to develop ROM, strength, and flexibility for 20 minutes including:    UBE fwd/backwards 2'/2' - supervised   Cervical rotation stretch 5x10"   Supine pec stretch on 1/2 foam roll 2'   Open books 1x10 3" B  Serratus punches 3lb 3x10 3"  Deep neck flexor activation 2x10 3"     neuromuscular re-education activities to improve:motor control, stabilization, Posture for 14 minutes. The following activities were included:  Chin tucks 2x10 3" seated  (B) UE ER w/scap retraction w/RTB 3x10   Rows on CC 20 lb 2x10 3" w/scap retraction       manual therapy techniques:  were applied to the: cervical paraspinals for 9 minutes, including:  STM to (B) UT       Home Exercises Provided and Patient Education Provided     Education provided:   - HEP     Written Home Exercises Provided: Patient instructed to cont prior HEP.   Van " demonstrated good  understanding of the education provided.     See EMR under Patient Instructions for exercises provided prior visit.      Assessment     Soft tissue restrictions noted along (L) UT during manual therapy. Pt was able to tolerate additional therex for improved flexibility and thoracic mobility. Pt tolerated therapy session without any adverse reactions.   Van Is progressing towards his goals.   Pt prognosis is Good.     Pt will continue to benefit from skilled outpatient physical therapy to address the deficits listed in the problem list box on initial evaluation, provide pt/family education and to maximize pt's level of independence in the home and community environment.     Pt's spiritual, cultural and educational needs considered and pt agreeable to plan of care and goals.    Anticipated barriers to physical therapy: chronicity of pain      Goals: Short Term Goals: 2 weeks   Pt will be compliant with HEP to supplement PT with decreasing pain and improving functional mobility     Long Term Goals: 6 weeks   Pt will improve FOTO score to </= 35% limited in order to demo improved functional mobility  Pt will demo (L) cervical rotation AROM = (R) cervical rotation AROM in order to improve ability to turn head when driving  Pt will report neck pain </=4/10 at worst    Plan     Continue per POC, progress as tolerated     Nidhi Dempsey, PT

## 2023-06-13 ENCOUNTER — CLINICAL SUPPORT (OUTPATIENT)
Dept: REHABILITATION | Facility: HOSPITAL | Age: 58
End: 2023-06-13
Attending: INTERNAL MEDICINE
Payer: MEDICARE

## 2023-06-13 DIAGNOSIS — M54.2 NECK PAIN: Primary | ICD-10-CM

## 2023-06-13 PROCEDURE — 97110 THERAPEUTIC EXERCISES: CPT | Mod: CQ

## 2023-06-13 PROCEDURE — 97140 MANUAL THERAPY 1/> REGIONS: CPT | Mod: CQ

## 2023-06-13 PROCEDURE — 97112 NEUROMUSCULAR REEDUCATION: CPT | Mod: CQ

## 2023-06-13 NOTE — PROGRESS NOTES
"  Physical Therapy Daily Treatment Note     Name: Van Baptisteion  Clinic Number: [id number]    Therapy Diagnosis:   Encounter Diagnosis   Name Primary?    Neck pain Yes     Physician: Garrett Go*    Visit Date: 6/13/2023    Physician Orders: PT Eval and Treat   Medical Diagnosis from Referral:   M54.2 (ICD-10-CM) - Neck pain   M47.812 (ICD-10-CM) - Spondylosis of cervical region without myelopathy or radiculopathy      Evaluation Date: 5/23/2023  Authorization Period Expiration: 4/23/24  Plan of Care Expiration: 7/7/23  Progress Note Due: 6/23/23  Visit # / Visits authorized: 1/ 1, 4/20  FOTO: 3    Time In: 10:30 am   Time Out: 11:15 am  Total Billable Time: 45 minutes    Precautions: Standard    Subjective     Pt reports: neck range of motion is improving but the pain is constant  He was compliant with home exercise program.  Response to previous treatment: no adverse effects  Functional change: pt stated that driving is a little easier looking (L)     Pain: 7/10 neck       Objective     Van received the treatments listed below:       therapeutic exercises to develop ROM, strength, and flexibility for 20 minutes including:    UBE fwd/backwards 2'/2' - supervised   Cervical rotation stretch 5x10"   Supine pec stretch on 1/2 foam roll 2'   Open books 1x10 3" B  Serratus punches 3lb 3x10 3"  Deep neck flexor activation 2x10 3"     neuromuscular re-education activities to improve:motor control, stabilization, Posture for 14 minutes. The following activities were included:  Chin tucks 2x10 3" seated  (B) UE ER w/scap retraction w/RTB 3x10   Rows on CC 20 lb 2x10 3" w/scap retraction  (seated)     manual therapy techniques:  were applied to the: cervical paraspinals for 10 minutes, including:  STM to (B) UT       Home Exercises Provided and Patient Education Provided     Education provided:   - HEP     Written Home Exercises Provided: Patient instructed to cont prior HEP.   Van demonstrated good  " understanding of the education provided.     See EMR under Patient Instructions for exercises provided prior visit.      Assessment     Pt with limited thoracic mobility. Cueing to avoid slumped posture while sitting. Pt was able to tolerate additional therex for improved flexibility and thoracic mobility. Pt tolerated therapy session without any adverse reactions.   Van Is progressing towards his goals.   Pt prognosis is Good.     Pt will continue to benefit from skilled outpatient physical therapy to address the deficits listed in the problem list box on initial evaluation, provide pt/family education and to maximize pt's level of independence in the home and community environment.     Pt's spiritual, cultural and educational needs considered and pt agreeable to plan of care and goals.    Anticipated barriers to physical therapy: chronicity of pain      Goals: Short Term Goals: 2 weeks   Pt will be compliant with HEP to supplement PT with decreasing pain and improving functional mobility     Long Term Goals: 6 weeks   Pt will improve FOTO score to </= 35% limited in order to demo improved functional mobility  Pt will demo (L) cervical rotation AROM = (R) cervical rotation AROM in order to improve ability to turn head when driving  Pt will report neck pain </=4/10 at worst    Plan     Continue per POC, progress as tolerated     He John, PTA

## 2023-06-22 ENCOUNTER — DOCUMENTATION ONLY (OUTPATIENT)
Dept: REHABILITATION | Facility: HOSPITAL | Age: 58
End: 2023-06-22
Payer: MEDICARE

## 2023-06-22 NOTE — PROGRESS NOTES
PT/PTA met face to face to discuss pt's treatment plan and progress towards established goals. Pt will be seen by a physical therapist minimally every 6th visit or every 30 days.    Ailin Pablo PTA    Face to Face PTA Conference performed with Ailin Pablo PTA  regarding patient's current status, overall progress, and plan of care.    Nidhi Dempsey, PT

## 2023-07-10 ENCOUNTER — OFFICE VISIT (OUTPATIENT)
Dept: SPORTS MEDICINE | Facility: CLINIC | Age: 58
End: 2023-07-10
Payer: MEDICARE

## 2023-07-10 ENCOUNTER — HOSPITAL ENCOUNTER (OUTPATIENT)
Dept: RADIOLOGY | Facility: HOSPITAL | Age: 58
Discharge: HOME | End: 2023-07-10
Attending: ORTHOPAEDIC SURGERY
Payer: MEDICARE

## 2023-07-10 VITALS
HEART RATE: 107 BPM | SYSTOLIC BLOOD PRESSURE: 111 MMHG | WEIGHT: 219.56 LBS | BODY MASS INDEX: 34.46 KG/M2 | HEIGHT: 67 IN | DIASTOLIC BLOOD PRESSURE: 77 MMHG

## 2023-07-10 DIAGNOSIS — M25.512 CHRONIC LEFT SHOULDER PAIN: Primary | ICD-10-CM

## 2023-07-10 DIAGNOSIS — G89.29 CHRONIC LEFT SHOULDER PAIN: Primary | ICD-10-CM

## 2023-07-10 DIAGNOSIS — M25.512 LEFT SHOULDER PAIN, UNSPECIFIED CHRONICITY: ICD-10-CM

## 2023-07-10 PROCEDURE — 73030 X-RAY EXAM OF SHOULDER: CPT | Mod: TC,LT

## 2023-07-10 PROCEDURE — 99999 PR PBB SHADOW E&M-EST. PATIENT-LVL III: ICD-10-PCS | Mod: PBBFAC,,, | Performed by: ORTHOPAEDIC SURGERY

## 2023-07-10 PROCEDURE — 99999 PR PBB SHADOW E&M-EST. PATIENT-LVL III: CPT | Mod: PBBFAC,,, | Performed by: ORTHOPAEDIC SURGERY

## 2023-07-10 PROCEDURE — 73030 X-RAY EXAM OF SHOULDER: CPT | Mod: 26,LT,, | Performed by: RADIOLOGY

## 2023-07-10 PROCEDURE — 99204 OFFICE O/P NEW MOD 45 MIN: CPT | Mod: S$PBB,,, | Performed by: ORTHOPAEDIC SURGERY

## 2023-07-10 PROCEDURE — 99213 OFFICE O/P EST LOW 20 MIN: CPT | Mod: PBBFAC | Performed by: ORTHOPAEDIC SURGERY

## 2023-07-10 PROCEDURE — 99204 PR OFFICE/OUTPT VISIT, NEW, LEVL IV, 45-59 MIN: ICD-10-PCS | Mod: S$PBB,,, | Performed by: ORTHOPAEDIC SURGERY

## 2023-07-10 PROCEDURE — 73030 XR SHOULDER COMPLETE 2 OR MORE VIEWS LEFT: ICD-10-PCS | Mod: 26,LT,, | Performed by: RADIOLOGY

## 2023-07-10 RX ORDER — METAXALONE 800 MG/1
400-800 TABLET ORAL DAILY PRN
COMMUNITY
Start: 2023-05-13 | End: 2023-09-07

## 2023-07-10 RX ORDER — ASPIRIN 81 MG/1
81 TABLET ORAL DAILY
COMMUNITY

## 2023-07-10 NOTE — PROGRESS NOTES
CC: Left shoulder pain    DATE OF PROCEDURE: 6/21/2019   Right  1. Shoulder open subpectoral biceps tenodesis (CPT 00067)  2. Shoulder arthroscopic limited debridement (CPT 88454)    3. Shoulder arthroscopic subacromial decompression (CPT 09774)       57 y.o. Male known to me, previously treated with right shoulder arthroscopy in 2019. He is retired from [employer]. Complaint of global, left shoulder pain for 3-4 weeks. Patient was fishing for four hours reports pain started afterward.  He is had some pain in the past but nothing to this extent.  Fairly significant.  Feels very similar to what he experienced in his right shoulder prior to surgery.  He is doing well with his right side.  Worse with overhead movement and external rotation (opening door handles). Pain is not disruptive to sleep at night. No relief with rest. Denies neck pain or radicular symptoms. Treatment thus far has included activity modifications, rest, self directed therapy, and oral medication.  Here today to discuss diagnosis and treatment options.     Cervical disc arthroplasty C3-4 by Dr. Noguera in 2018.    PMHx notable for PMHx notable for L leg reflex sympathetic dystrophy - he has used a single crutch for years as a result, PE.  Not currently on any anticoagulants.   Negative for tobacco.   Negative for diabetes.     Pain Score:   6    PAST MEDICAL HISTORY:   Past Medical History:   Diagnosis Date    Broken femur     Deep vein thrombosis     Hyperlipidemia     Immune disorder     Joint pain     Nerve damage left    Pulmonary embolism     Reflex sympathetic dystrophy 1983    Left leg after broken femur surgery     PAST SURGICAL HISTORY:  Past Surgical History:   Procedure Laterality Date    ARTHROSCOPY OF SHOULDER WITH DECOMPRESSION OF SUBACROMIAL SPACE Right 6/21/2019    Procedure: ARTHROSCOPY, SHOULDER, WITH SUBACROMIAL SPACE DECOMPRESSION;  Surgeon: TALON Brink MD;  Location: [facility] OR [address] [place] [address];  Service: Orthopedics;   Laterality: Right;    CERVICAL DISC ARTHROPLASTY  01/23/2018    C3/4    COLONOSCOPY N/A 2/25/2016    Procedure: COLONOSCOPY;  Surgeon: Daryl Viramontes MD;  Location: [facility] (4TH FLR);  Service: Endoscopy;  Laterality: N/A;  PM Prep      FEMUR SURGERY      HERNIA REPAIR      umbilical    SHOULDER ARTHROSCOPY Right 6/21/2019    Procedure: ARTHROSCOPY, SHOULDER, BICEPS TENODESIS;  Surgeon: TALON Brink MD;  Location: [facility] OR 1ST FLR;  Service: Orthopedics;  Laterality: Right;  REGIONAL W/CATHETER INTERSCALENE  [employer] notified 6-19 LO  [facility]der[facility]/arthrex notified 6-20 LO    SYNOVECTOMY OF SHOULDER Right 6/21/2019    Procedure: SYNOVECTOMY, SHOULDER;  Surgeon: TALON Brink MD;  Location: [facility] OR 1ST FLR;  Service: Orthopedics;  Laterality: Right;    TONSILLECTOMY       FAMILY HISTORY:  Family History   Problem Relation Age of Onset    Heart disease Mother 60    Neuropathy Mother     Heart disease Father 45    Melanoma Neg Hx      MEDICATIONS:    Current Outpatient Medications:     aspirin (ECOTRIN) 81 MG EC tablet, Take 81 mg by mouth once daily., Disp: , Rfl:     folic acid (FOLVITE) 1 MG tablet, TAKE 1 TABLET(1 MG) BY MOUTH EVERY DAY, Disp: 30 tablet, Rfl: 1    metaxalone (SKELAXIN) 800 MG tablet, Take 400-800 mg by mouth daily as needed., Disp: , Rfl:     methotrexate 2.5 MG Tab, 5 tabs weekly, Disp: 75 tablet, Rfl: 1    multivitamin with minerals tablet, Take 1 tablet by mouth once daily., Disp: , Rfl:     rosuvastatin (CRESTOR) 10 MG tablet, Take 1 tablet (10 mg total) by mouth once daily., Disp: 90 tablet, Rfl: 1    sertraline (ZOLOFT) 100 MG tablet, Take 1 tablet (100 mg total) by mouth once daily., Disp: 90 tablet, Rfl: 1    cyclobenzaprine (FLEXERIL) 5 MG tablet, 5 mg every other day (Patient not taking: Reported on 7/10/2023), Disp: 15 tablet, Rfl: 5    ALLERGIES:  Review of patient's allergies indicates:   Allergen Reactions    Escitalopram oxalate     Cymbalta [duloxetine] Rash     Red rashes on arms  "and chest    Lamisil [terbinafine hcl] Rash     REVIEW OF SYSTEMS:  Constitution: Negative. Negative for chills, fever and night sweats.    Hematologic/Lymphatic: Negative for bleeding problem. Does not bruise/bleed easily.   Skin: Negative for dry skin, itching and rash.   Musculoskeletal: Negative for falls. Positive for left shoulder pain and muscle weakness.     All other review of symptoms were reviewed and found to be noncontributory.    PHYSICAL EXAMINATION:  Vitals:  /77 (BP Location: Right arm, Patient Position: Sitting, BP Method: Large (Automatic))   Pulse 107   Ht 5' 7" (1.702 m)   Wt 99.6 kg (219 lb 9.3 oz)   BMI 34.39 kg/m²    General: Well-developed well-nourished 57 y.o. malein no acute distress   Cardiovascular: Regular rhythm by palpation of distal pulse, normal color and temperature, no concerning varicosities on symptomatic side   Lungs: No labored breathing or wheezing appreciated   Neuro: Alert and oriented ×3   Psychiatric: well oriented to person, place and time, demonstrates normal mood and affect   Skin: No rashes, lesions or ulcers, normal temperature, turgor, and texture on uninvolved extremity    Ortho/SPM Exam  Examination of the left shoulder demonstrates active forward elevation to 150, ER with arm at side to 45, IR to T10. Passive FE to 155, ER to 50.  Pain to palpation over the proximal biceps groove with positive modified speed's test.  There also is some tenderness and pain over the Codman's point.  Negative AC tenderness. 4-/5 resisted supraspinatus testing with pain. 4-/5 resisted infraspinatus testing. Negative ER Lag Sign. Negative belly press test. Stable shoulder. No midline neck tenderness. Negative Spurling's maneuver.     IMAGING:  Xrays including AP, Outlet and Axillary Lateral of Left shoulder are ordered / images reviewed by me:   No significant DJD    ASSESSMENT:      ICD-10-CM ICD-9-CM   1. Chronic left shoulder pain  M25.512 719.41    G89.29 338.29 "     PLAN:     Findings discussed with the patient.  He injured his shoulder while fishing.  Pain and weakness on resisted cuff testing despite initial conservative treatment. History of prior right shoulder rotator cuff tear requiring surgery.  MRI is indicated for further assessment.  Virtual visit thereafter to discuss results and treatment options.    Procedures

## 2023-07-13 ENCOUNTER — HOSPITAL ENCOUNTER (OUTPATIENT)
Dept: RADIOLOGY | Facility: HOSPITAL | Age: 58
Discharge: HOME | End: 2023-07-13
Attending: ORTHOPAEDIC SURGERY
Payer: MEDICARE

## 2023-07-13 DIAGNOSIS — M25.512 CHRONIC LEFT SHOULDER PAIN: ICD-10-CM

## 2023-07-13 DIAGNOSIS — G89.29 CHRONIC LEFT SHOULDER PAIN: ICD-10-CM

## 2023-07-13 PROCEDURE — 73221 MRI JOINT UPR EXTREM W/O DYE: CPT | Mod: TC,PO,LT

## 2023-07-14 NOTE — PRE-PROCEDURE INSTRUCTIONS
Nephrology follow-up for worsening microalbuminuria in the setting of CKD stage IIIa    HPI:    This is an 85-year-old male with past medical history significant for HTN, HLD, mitral regurgitation, BPH, history of nephrolithiasis, osteoarthritis, back pain with history of lumbar spinal fusion s/p surgery 10/2022 and underlying CKD stage IIIa who was referred to our service for worsening microalbuminuria    Chart revealed that patient with underlying CKD stage IIIa  Baseline creatinine seems to be between 1.3-1.5 with GFR averaging between 45 - 50 mL/min    Creatinine was 1.3 as of 12/5/2018  Creatinine was 1.3 with GFR of 53 mL/min as of 9/8/2020  Creatinine was 1.3 with GFR of 54 mL/min as of 11/7/2022  Creatinine was 1.48 with GFR of 45 mL/min as of 10/13/2020  Creatinine is 1.5 with GFR of 45 mL/min as of 7/25/2022  Creatinine is 1.40 with GFR of 49 mL/min as of 6/2/2023    Patient was noted to have microalbuminuria since 2/3/2023    UA from 9/2020 and 7/23/2021 without any proteinuria  Microalbuminuria was never quantitated before 2/3/2023    Noted to have significant worsening of microalbuminuria on recent labs from 5/30/2023  Microalbumin/creatinine ratio from 5/30/2023 significantly elevated at 1180.1 mg/g    Patient is nondiabetic  A1c from 7/25/2022  Is 5.1    No renal ultrasound in the chart    Patient with extensive history of nephrolithiasis and follows up by Dr. Galdamez-urology  Was last seen by urology on 2/14/2023  KUB from 2/2/2021 with 2 small left renal calculi-5 mm and 6 mm  Previous stone evaluation revealed calcium oxalate stones  History of left ESWL 2008  S/p cystoscopy with laser lithotripsy of bladder calculi and transurethral resection of prostatic calculi 3/30/2011  S/p TURP 5/31/2011    Also with history of BPH.  Not on any medications  Conservative management at this time    Apparently patient was on losartan-HCTZ 100-25 mg daily for a long time  Noted to have persistent hyperkalemia on  Pre-op interview completed via phone. Patient instructed about remaining NPO after midnight, medications to take AM of surgery,  place/floor to check in on the day of surgery. Instructed to bathe with antibacterial soap night before and morning of surgery, no makeup, lotions, hair products, deodorant, etc.   multiple lab draws  Subsequently losartan was discontinued on 5/4/2023  Patient was maintained on HCTZ    Amlodipine started on 5/4/2023    Patient was seen by PCP on 6/15/2023.    Significant lower extremity edema noted  Subsequently amlodipine was discontinued on 6/15/2023  Microalbuminuria significantly worsened and > 1 g on recent lab draw.    Subsequently referred to nephrology    Patient recently switched PCP and started  following Dr. Canada since 11/1/2022  Patient did not have any issues with hypertension previously      Patient was seen in office on 6/16/2023 for initial consultation  Renal function was baseline.  Worsening microalbuminuria was thought to be secondary to discontinuation of losartan  However given patient nondiabetic and significant microalbuminuria, GN work-up sent  Renal ultrasound scheduled  Patient was restarted on losartan 100 mg daily  Lower extremity edema noted  Subsequently HCTZ discontinued  Patient was started on Lasix 20 mg daily  Also placed on 2 g potassium restricted diet    In the interim patient was noted to have elevated blood pressure  Subsequently was started on hydralazine 25 mg 3 times daily since 6/27/2023    Patient was seen in office today for follow-up    Manual blood pressure checked by me was mildly elevated at 145/60 mmHg    Patient did bring home blood pressure readings that were significantly elevated compared to our readings.  Home blood pressure readings averaging 150-170/60-75 mmHg.    Patient with severe chronic anxiety    Still with some left lower extremity edema.  Right lower extremity edema resolved      Review of system is positive for BURKS with mowing lawns otherwise normal walking and climbing up stairs does not cause BURKS  Patient underwent echocardiogram yesterday-report pending  Patient is scheduled for cardiac stress testing for 7/26/2023    He does report nocturia 3-4/night but then admits to of drinking fluids late at night    Patient otherwise  denies any nausea vomiting no diarrhea no abdominal pain.  Denies any cough SOB BURKS orthopnea PND.  No chest pain headache dizziness or lightheadedness.  Urine output seems to be adequate.  Denies any frequency urgency dysuria or foaminess to the urine.  Reports nocturia.  Some left lower extremity edema noted.  No right lower extremity edema.  History of renal calculi.  Denies any NSAID use.  Rest of system otherwise negative    No family history of CKD/ESRD    Medication list reviewed with the patient    Currently on Pravachol 40 mg daily, nebivolol 10 mg daily, losartan 100 mg daily (restarted since 6/16/2023), hydralazine 25 mg 3 times daily and Lasix 20 mg daily    Was on losartan-HCTZ 100-25 mmHg that was discontinued since 5/4/2023 due to persistent hyperkalemia  Amlodipine started on 5/4/2023-discontinued on 6/15/2023 due to significant lower extremity edema  HCTZ discontinued on 6/16/2023    Past Medical History:   Past Medical History:   Diagnosis Date   • CTS (carpal tunnel syndrome)     s/p bl surgery 2023   • History of SCC (squamous cell carcinoma) of skin 2021   • HLD (hyperlipidemia)    • HTN (hypertension)    • Kidney stones    • Mitral regurgitation 09/08/2020    TTE 2021 w/ mild mitral valve regurgitation likely related to myxomatous degenerative disease    • Stage 3 chronic kidney disease (CMD)    • Status post lumbar spinal fusion 2020    L2 to L5 posterior spinal fusion    • Subdural hemorrhage following injury (CMD) 2015    with seizure history       Past Surgical History:  Past Surgical History:   Procedure Laterality Date   • Carpal tunnel release Right 03/2023   • Carpal tunnel release Left 05/19/2023   • Lumbar fusion  10/2020    L2 to L5 posterior spinal fusion   • Squamous cell carcinoma excision  2021    scalp        Social History:  Social History     Socioeconomic History   • Marital status: /Civil Union     Spouse name: Not on file   • Number of children: 2   • Years of  education: Not on file   • Highest education level: Not on file   Occupational History   • Occupation: retired postal service   • Occupation: bunch shop owner   Tobacco Use   • Smoking status: Never   • Smokeless tobacco: Never   Vaping Use   • Vaping Use: never used   Substance and Sexual Activity   • Alcohol use: Not Currently   • Drug use: Never   • Sexual activity: Yes     Partners: Female   Other Topics Concern   • Not on file   Social History Narrative    Lives with wife    2 sons - Erendira     Social Determinants of Health     Financial Resource Strain: Not on file   Food Insecurity: Not on file   Transportation Needs: Not on file   Physical Activity: Not on file   Stress: Not on file   Social Connections: Not on file   Intimate Partner Violence: Not on file        Family HIstory:   Family History   Problem Relation Age of Onset   • Patient is unaware of any medical problems Mother    • Patient is unaware of any medical problems Father         Current Outpatient Medications   Medication Sig Dispense Refill   • hydrALAZINE (APRESOLINE) 25 MG tablet 50 mg ( 2 tab ) q am  , 25 mg ( 1 tab)  q noon and 25 mg (1 tab)  q pm 90 tablet 3   • losartan (COZAAR) 100 MG tablet Take 1 tablet by mouth daily. 30 tablet 3   • furosemide (Lasix) 20 MG tablet Take 1 tablet by mouth daily. 30 tablet 3   • pravastatin (PRAVACHOL) 40 MG tablet Take 1 tablet by mouth at bedtime. 90 tablet 3   • HYDROcodone-acetaminophen (Norco) 5-325 MG per tablet Take 1 tablet by mouth every 6 hours as needed for Pain. 11 tablet 0   • nebivolol (BYSTOLIC) 10 MG tablet Take 1 tablet by mouth daily. 90 tablet 3   • Cholecalciferol (Vitamin D3) 1.25 mg (50,000 units) capsule Vitamin D3   25mcg Daily       No current facility-administered medications for this visit.         Allergies: ALLERGIES:  Amlodipine    Review of Systems:    Constitutional: No weight gain or weight loss.  No fevers, no chills.  HEENT: No headache.  No blurry vision,  no diplopia.  No sore throat.  No tinnitus.  No nasal discharge.  Respiratory: No cough.  No sputum.  No hemoptysis.  Cardiovascular: No chest pain.  No shortness of breath.  No orthopnea, no PND.  ++ ankle swelling.  GI: No abdominal pain.  No diarrhea, no constipation.  No melena, no hematochezia.  : As per HPI.    Musculoskeletal: No arthralgia, no myalgia.  Neurological: No dizziness or falls.    Lymphovascular: No easy bruising.  No recent lymphadenopathy.    OBJECTIVE  Vitals:   Vitals:    07/14/23 1010 07/14/23 1013 07/14/23 1018   BP: (!) 143/51 (!) 163/69 (!) 162/54   BP Location: LUE - Left upper extremity LUE - Left upper extremity LUE - Left upper extremity   Patient Position: Sitting Sitting Sitting   Cuff Size: Regular Regular Regular   Pulse: (!) 58     Resp: 18     SpO2: 96%     Weight: 69 kg (152 lb 3.2 oz)         Weight    07/14/23 1010   Weight: 69 kg (152 lb 3.2 oz)        Physical Exam:  Head: Atraumatic, normocephalic.  Eyes: PERRLA, Extraocular muscles are intact.  No icterus, no pallor.  Neck: Supple.  No JVD.    Heart S1, S2 heard.   RRR  Lungs: Clear to auscultation bilaterally.  Abdomen: Nondistended.  Benign.  Extremities: Peripheral pulses palpable.  +1 left lower extremity edema, minimum right lower extremity edema, no clubbing, no cyanosis.  Neurological: Alert, oriented ×3.  Psych :Mood and affect appropriate    Labs:  No results found for: \"BLOODUREANIT\", \"CHLORIDESERU\", \"CREATININESE\", \"NE6IWJELZ\", \"GLUCOSERANDO\", \"KPOTASSIUMSE\", \"NASODIUMSERU\", \"CALCIUMSERUM\", \"EGFR*\", \"EGFR*NONAFRI\", \"PHOSPHORUSSE\", \"PARATHYROIDH\"  Lab Results   Component Value Date    PHOS 3.5 06/26/2023    SODIUM 138 06/26/2023    POTASSIUM 4.4 06/26/2023    CHLORIDE 102 06/26/2023    CO2 27 06/26/2023    ANIONGAP 13 06/26/2023    GLUCOSE 107 (H) 06/26/2023    BUN 29 (H) 06/26/2023    CREATININE 1.30 (H) 06/26/2023    CALCIUM 8.8 06/26/2023    BILIRUBIN 0.6 06/26/2023    AST 26 06/26/2023    GPT 28  06/26/2023    ALKPT 108 06/26/2023    ALBUMIN 4.1 06/26/2023    TOTPROTEIN 7.1 06/26/2023    GLOB 3.0 06/26/2023    AGR 1.4 06/26/2023    INTAC 69 06/26/2023     Lab Results   Component Value Date    WBC 5.5 06/26/2023    RBC 4.30 (L) 06/26/2023    HGB 13.2 06/26/2023    HCT 40.0 06/26/2023    MCV 93.0 06/26/2023    MCH 30.7 06/26/2023    MCHC 33.0 06/26/2023    RDWCV 13.1 06/26/2023    RDWSD 44.7 06/26/2023     06/26/2023    NRBCRE 0 06/26/2023    SEG 68 06/26/2023    TLYMPH 22 06/26/2023    PMON 7 06/26/2023    PEOS 2 06/26/2023    PBASO 1 06/26/2023    IGRE 0 06/26/2023    ANEUT 3.7 06/26/2023    ALYMS 1.2 06/26/2023    DORY 0.4 06/26/2023    AEOS 0.1 06/26/2023    ABASO 0.0 06/26/2023    IGAB 0.0 06/26/2023     Lab Results   Component Value Date    SODIUM 138 06/26/2023    POTASSIUM 4.4 06/26/2023    CHLORIDE 102 06/26/2023    CO2 27 06/26/2023    ANIONGAP 13 06/26/2023    GLUCOSE 107 (H) 06/26/2023    BUN 29 (H) 06/26/2023    CREATININE 1.30 (H) 06/26/2023    GFRESTIMATE 54 (L) 06/26/2023    CALCIUM 8.8 06/26/2023    BILIRUBIN 0.6 06/26/2023    AST 26 06/26/2023    GPT 28 06/26/2023    ALKPT 108 06/26/2023    ALBUMIN 4.1 06/26/2023    TOTPROTEIN 7.1 06/26/2023    GLOB 3.0 06/26/2023    AGR 1.4 06/26/2023     Lab Results   Component Value Date    PA2RI <1:10 06/26/2023     Lab Results   Component Value Date    ALBUMIN 4.1 06/26/2023     Lab Results   Component Value Date    INTAC 69 06/26/2023     Lab Results   Component Value Date    HGB 13.2 06/26/2023        No results found for: \"PHURINE\", \"SPECIFICGRAV\", \"BLOODURINE\", \"URPRTNQUAL\", \"LEUKOCYTEEST\", \"NITRITEURINE\", \"WHTBLDCLCTUR\", \"RDBLDCLCNTUR\", \"URPRTNMGDL\", \"CRTNUR\", \"URPRTNCRTRAT\"  No results found for: \"MICALBUR\", \"CREATURRND\", \"MICALBCRTRT\"  Lab Results   Component Value Date    MAR 2.60 06/26/2023    UCR 18.10 06/26/2023    UCR 18.01 06/26/2023    MALBCR 143.6 (H) 06/26/2023     Lab Results   Component Value Date    COL Straw 06/26/2023    UAPP  Clear 06/26/2023    UGLU Negative 06/26/2023    UBILI Negative 06/26/2023    UKET Negative 06/26/2023    USPG 1.008 06/26/2023    URBC Negative 06/26/2023    UPH 6.0 06/26/2023    UPROT Negative 06/26/2023    UROB 0.2 06/26/2023    UNITR Negative 06/26/2023    UWBC Negative 06/26/2023       Relevant records,labs and diagnostics were reviewed.    Renal ultrasound-6/28/2023    FINDINGS: Right kidney measures 10.7 cm. Left kidney measures 10.7 cm  corticomedullary differentiation is preserved.      8 mm nonobstructing stone at the midpole left kidney. No hydronephrosis.     Right:  1.6 x 1.4 x 1.3 cm anechoic cyst with thin septation at the upper pole.  3.5 x 2.2 x 2.5 cm lobulated anechoic cyst at the midpole.     Left:  1.4 x 1.1 x 1.2 cm anechoic cyst at the lower pole.  4.9 x 5.3 x 5.3 cm anechoic cyst with debris at the lower pole.     Bladder is underdistended and not fully assessed.     IMPRESSION:     1. Multiple simple and mildly complex renal cysts.  2. Nonobstructing left nephrolithiasis.    ASSESSMENT/PLAN    1. CKD stage IIIa with baseline creatinine between 1.3-1.5 with GFR averaging between 45-50 mL/min.  New onset microalbuminuria since 2/2023.  Previous UA from 9/20/2020 and 7/23/2021 without any proteinuria.  No previous hematuria.  Renal ultrasound without any CKD changes.  Likely etiology of underlying CKD stage III appears to be hypertensive nephrosclerosis/age/related nephrosclerosis.  Patient was on losartan 100 mg daily as losartan-HCTZ that was discontinued since 5/4/2023 due to persistent hyperkalemia.  Creatinine is 1.30 with GFR of 54 mL/min as of 6/26/2023.  Back on losartan 100 mg daily since 6/16/2023 due to significant microalbuminuria/poorly controlled blood pressure.  Cr remains stable/K/HCO3 OK.  Renal function seems to be baseline  2. Worsening microalbuminuria in a nondiabetic patient.  Microalbuminuria noted since 2/3/2023.  Prior UA from 9/20/2020 and 7/20/2021 without any  proteinuria hematuria.  Microalbumin/creatinine ratio from 5/30/2023 significantly elevated at 1180.1 mg/g <--159.4 mg/g ( 5/1/2023) <--70.8 mg/g ( 3/28/23)  <--33.8 mg/g ( 2/3/230.  Patient was on losartan 100 mg daily , discontinued on 5/4/2023 persistent hyperkalemia.  Microalbuminuria worsened since then.  Unclear etiology of significant microalbuminuria in a nondiabetic patient.  Possibility of advanced CKD accounting for significant microalbuminuria/proteinuria.  GN work-up negative.  Was off of on RAAS blockade since 5/4/2023.  Losartan 100 mg daily restarted since 6/16/2023.  Most recent microalbumin/creatinine ratio from 6/26/2023 significantly improved at 143.6 mg/g.  UPC from 6/26/2023 unmeasurable.  Exact etiology of microalbuminuria still unclear given renal ultrasound normal without any significant CKD changes.  Microalbuminuria markedly improved since restarting losartan 100 mg daily  3. HTN, Elevated but improved.  Home blood pressure readings significantly off compared to office readings.  Previously was on losartan-HCTZ 100-25 mg daily that was discontinued since  5/4/2023 persistent hyperkalemia.  Was on amlodipine since 5/4/2023 was discontinued on 6/15/2023 due to significant lower extremity edema.  HCTZ 25 mg daily discontinued 6/16/2023.  Currently on losartan 100 mg daily, hydralazine 25 mg 3 times daily and nebivolol 10 mg daily.  Pulse is 58.  4. S/p hyperkalemia with potassium of 5.4 as of 5/3/2023.  Likely etiology appears to be dietary indiscretion in the setting of losartan 100 mg daily.  Losartan discontinued since  5/4/2023.  Hyperkalemia improved.  Currently on 2 g potassium restricted diet.  Back on losartan 100 mg daily.  Also on Lasix 20 mg daily both for lower extremity edema and for kaliuresis.  Potassium is 4.4 as of 6/26/2023   5. History of nephrolithiasis.Patient with extensive history of nephrolithiasis and follows up by Dr. Galdamez-urology. Was last seen by urology on  2/14/2023. KUB from 2/2/2021 with 2 small left renal calculi-5 mm and 6 mm. Previous stone evaluation revealed calcium oxalate stones. History of left ESWL 2008. S/p cystoscopy with laser lithotripsy of bladder calculi and transurethral resection of prostatic calculi 3/30/2011. S/p TURP 5/31/2011.  Most recent renal ultrasound with nonobstructing left nephrolithiasis.  UA from 6/26/2023 without any hematuria  6. BPH without  LUTS.  Not on any medications.  S/p cystoscopy 3/2011.  Follows up by urology  7. Lower extremity edema likely secondary to amlodipine.  BNP from 5/30/2023 normal at 429.  Amlodipine discontinued since 6/15/2023.  Also off of HCTZ since 6/16/2023 on Lasix 20 mg daily.  Evidence of +1 lower extremity edema left lower extremity.  No right lower extremity edema.  Cardiac work-up in progress.  Echo done yesterday-results pending.  Upcoming cardiac stress test.  8. Complex renal cysts.    9. Volume status.  Evidence of left lower extremity edema.  On Lasix 6 20 mg daily    Plan:    1.  Renal function is currently baseline.  Monitor renal function closely  2.  Discussed with the patient underlying CKD-etiology prognosis realistic goals and expectations.  Verbalized understanding  3.  Noted significant improvement in microalbuminuria with reinstitution of losartan.  Continue losartan 100 mg daily.    4.  GN work-up was negative.  Given the fact that microalbuminuria significantly improved with reinstitution of losartan, would not plan further work-up.  Not considering renal biopsy at this time  5.  Renal ultrasound findings discussed with the patient detail   6.  Continue Lasix 20 mg daily.  Lower extremity edema improved.  Still with some left lower extremity edema.  Follow-up on echo results.  7.  Patient was advised to limit fluid intake 2 to 3 hours prior to bedtime to manage nocturia.  Verbalized understanding  8.  Continue 2 g potassium restricted diet.    9.  2 g sodium restricted diet  10.   Avoid nephrotoxic agent that includes NSAIDs and IV contrast study  11.  Goal BP <130/80 mmHg  12.  Avoid calcium channel blockers in view of lower extremity edema  13.  Increase hydralazine to 50 mg every morning, 25 mg q. noon and 25 mg every afternoon  14.  Continue rest of the antihypertensives that include nebivolol 10 mg daily, losartan 100 mg daily.  Pulse is 58  15.  Exact etiology of underlying microalbuminuria still unclear given renal ultrasound normal.  Patient nondiabetic.  However given the fact that GN work-up is negative and microalbuminuria significantly improved with reinstitution of losartan and patient tolerating maximum dose of losartan well without any recurrence of hyperkalemia would not pursue further work-up especially given advanced age and associated possible risks with renal biopsy.   16.  Follow-up with repeat labs in 3 month    All questions answered.  Verbalized understanding      RTC in 3 month with labs,ordered.      Christina Rodgers MD      This dictation was created using Dragon voice recognition software and thus transcription errors or variance may occur.

## 2023-07-17 RX ORDER — FOLIC ACID 1 MG/1
TABLET ORAL
Qty: 30 TABLET | Refills: 1 | Status: SHIPPED | OUTPATIENT
Start: 2023-07-17 | End: 2023-10-16

## 2023-07-17 NOTE — PROGRESS NOTES
Telemedicine/Virtual Visit Documentation:     The patient location is: home    The chief complaint leading to consultation is: see HPI    VISIT TYPE X   Virtual visit with synchronous audio and video x   Telephone E/M service      Total time spent with patient: see X pedro on chart below.   More than half of the time was spent counseling or coordinating care including prognosis, differential diagnosis, risks and benefits of treatment, instructions, compliance risk reductions     EST MINUTES X   15561 5    [id number] 10 x   99213 15    59653 25    86109 40    NEW     48693 10    37169 20    73279 30    06728 45    96830 60    PHONE      5-10    79742 11-20    31708 21-30      H&P  Orthopaedics      SUBJECTIVE:     History of Present Illness:  Patient is a 57 y.o. male of left shoulder pain.  Similar to what he felt on the right side prior to surgery.  He also notes that after his MRI he felt a pop in the shoulder while drying himself coming out of the shower.  Pain localized anteriorly and also deep in the shoulder over the top.    Review of patient's allergies indicates:   Allergen Reactions    Escitalopram oxalate     Cymbalta [duloxetine] Rash     Red rashes on arms and chest    Lamisil [terbinafine hcl] Rash     Past Medical History:   Diagnosis Date    Broken femur     Deep vein thrombosis     Hyperlipidemia     Immune disorder     Joint pain     Nerve damage left    Pulmonary embolism     Reflex sympathetic dystrophy 1983    Left leg after broken femur surgery     Past Surgical History:   Procedure Laterality Date    ARTHROSCOPY OF SHOULDER WITH DECOMPRESSION OF SUBACROMIAL SPACE Right 6/21/2019    Procedure: ARTHROSCOPY, SHOULDER, WITH SUBACROMIAL SPACE DECOMPRESSION;  Surgeon: TALON Brink MD;  Location: [facility] OR [address] [place] [address];  Service: Orthopedics;  Laterality: Right;    CERVICAL DISC ARTHROPLASTY  01/23/2018    C3/4    COLONOSCOPY N/A 2/25/2016    Procedure: COLONOSCOPY;  Surgeon: Daryl Viramontes MD;   Location: [facility] ENDO (4TH FLR);  Service: Endoscopy;  Laterality: N/A;  PM Prep      FEMUR SURGERY      HERNIA REPAIR      umbilical    SHOULDER ARTHROSCOPY Right 6/21/2019    Procedure: ARTHROSCOPY, SHOULDER, BICEPS TENODESIS;  Surgeon: TALON Brink MD;  Location: [facility] OR 1ST FLR;  Service: Orthopedics;  Laterality: Right;  REGIONAL W/CATHETER INTERSCALENE  [employer] notified 6-19 LO  [facility]derick/[facility] notified 6-20 LO    SYNOVECTOMY OF SHOULDER Right 6/21/2019    Procedure: SYNOVECTOMY, SHOULDER;  Surgeon: TALON Brink MD;  Location: [facility] OR 1ST FLR;  Service: Orthopedics;  Laterality: Right;    TONSILLECTOMY       Family History   Problem Relation Age of Onset    Heart disease Mother 60    Neuropathy Mother     Heart disease Father 45    Melanoma Neg Hx      Social History     Tobacco Use    Smoking status: Never    Smokeless tobacco: Never   Substance Use Topics    Alcohol use: Yes     Comment: occasional    Drug use: No      Review of Systems:  Patient denies constitutional symptoms, cardiac symptoms, respiratory symptoms, GI symptoms.  The remainder of the musculoskeletal ROS is included in the HPI.    OBJECTIVE:     Physical Exam:  Gen:  No acute distress    Left shoulder MRI 7/13/23:  1. Ill-defined signal increase throughout the substance of the anterosuperior glenoid labrum compatible with labral tear.  2. Moderate supraspinatus tendinosis with possible small partial thickness insertional tear.  3. Mild tendinosis of the intra-articular portion of the long head of the biceps tendon.    ASSESSMENT/PLAN:     A/P: Van Jones is a 57 y.o. left shoulder biceps tendinopathy/tendinitis, low-grade partial under surface rotator cuff tearing, suspicious for SLAP tear    Plan:  Imaging reviewed with the patient.  Treatment options discussed.  I have recommended additional conservative treatment at this time to try to avoid surgery.  Discussed the potential benefit of a glenohumeral steroid injection versus  biceps sheath based upon repeat exam.  We will discuss physical therapy as an option as well when he returns on Thursday for his injection.  Possible self-directed home program versus formal therapy.

## 2023-07-18 ENCOUNTER — OFFICE VISIT (OUTPATIENT)
Dept: SPORTS MEDICINE | Facility: CLINIC | Age: 58
End: 2023-07-18
Payer: MEDICARE

## 2023-07-18 DIAGNOSIS — M67.922 BICEPS TENDINOPATHY, LEFT: Primary | ICD-10-CM

## 2023-07-18 DIAGNOSIS — M75.111 PARTIAL NONTRAUMATIC TEAR OF RIGHT ROTATOR CUFF: ICD-10-CM

## 2023-07-18 DIAGNOSIS — M25.512 CHRONIC LEFT SHOULDER PAIN: ICD-10-CM

## 2023-07-18 DIAGNOSIS — G89.29 CHRONIC LEFT SHOULDER PAIN: ICD-10-CM

## 2023-07-18 PROCEDURE — 99212 PR OFFICE/OUTPT VISIT, EST, LEVL II, 10-19 MIN: ICD-10-PCS | Mod: 95,,, | Performed by: ORTHOPAEDIC SURGERY

## 2023-07-18 PROCEDURE — 99212 OFFICE O/P EST SF 10 MIN: CPT | Mod: 95,,, | Performed by: ORTHOPAEDIC SURGERY

## 2023-07-20 ENCOUNTER — OFFICE VISIT (OUTPATIENT)
Dept: SPORTS MEDICINE | Facility: CLINIC | Age: 58
End: 2023-07-20
Payer: MEDICARE

## 2023-07-20 VITALS
HEIGHT: 67 IN | SYSTOLIC BLOOD PRESSURE: 127 MMHG | BODY MASS INDEX: 34.6 KG/M2 | DIASTOLIC BLOOD PRESSURE: 87 MMHG | WEIGHT: 220.44 LBS | HEART RATE: 92 BPM

## 2023-07-20 DIAGNOSIS — S43.432A DEGENERATIVE SUPERIOR LABRAL ANTERIOR-TO-POSTERIOR (SLAP) TEAR OF LEFT SHOULDER: ICD-10-CM

## 2023-07-20 DIAGNOSIS — M75.112 INCOMPLETE TEAR OF LEFT ROTATOR CUFF, UNSPECIFIED WHETHER TRAUMATIC: ICD-10-CM

## 2023-07-20 DIAGNOSIS — M25.512 CHRONIC LEFT SHOULDER PAIN: ICD-10-CM

## 2023-07-20 DIAGNOSIS — M67.922 BICEPS TENDINOPATHY, LEFT: Primary | ICD-10-CM

## 2023-07-20 DIAGNOSIS — G89.29 CHRONIC LEFT SHOULDER PAIN: ICD-10-CM

## 2023-07-20 PROCEDURE — 20610 DRAIN/INJ JOINT/BURSA W/O US: CPT | Mod: PBBFAC,LT | Performed by: ORTHOPAEDIC SURGERY

## 2023-07-20 PROCEDURE — 99999PBSHW PR PBB SHADOW TECHNICAL ONLY FILED TO HB: Mod: PBBFAC,,,

## 2023-07-20 PROCEDURE — 99999PBSHW PR PBB SHADOW TECHNICAL ONLY FILED TO HB: ICD-10-PCS | Mod: PBBFAC,,,

## 2023-07-20 PROCEDURE — 99213 OFFICE O/P EST LOW 20 MIN: CPT | Mod: PBBFAC,25 | Performed by: ORTHOPAEDIC SURGERY

## 2023-07-20 PROCEDURE — 99999 PR PBB SHADOW E&M-EST. PATIENT-LVL III: CPT | Mod: PBBFAC,,, | Performed by: ORTHOPAEDIC SURGERY

## 2023-07-20 PROCEDURE — 99213 PR OFFICE/OUTPT VISIT, EST, LEVL III, 20-29 MIN: ICD-10-PCS | Mod: 25,S$PBB,, | Performed by: ORTHOPAEDIC SURGERY

## 2023-07-20 PROCEDURE — 20610 DRAIN/INJ JOINT/BURSA W/O US: CPT | Mod: S$PBB,LT,, | Performed by: ORTHOPAEDIC SURGERY

## 2023-07-20 PROCEDURE — 99213 OFFICE O/P EST LOW 20 MIN: CPT | Mod: 25,S$PBB,, | Performed by: ORTHOPAEDIC SURGERY

## 2023-07-20 PROCEDURE — 20610 LARGE JOINT ASPIRATION/INJECTION: L GLENOHUMERAL: ICD-10-PCS | Mod: S$PBB,LT,, | Performed by: ORTHOPAEDIC SURGERY

## 2023-07-20 PROCEDURE — 99999 PR PBB SHADOW E&M-EST. PATIENT-LVL III: ICD-10-PCS | Mod: PBBFAC,,, | Performed by: ORTHOPAEDIC SURGERY

## 2023-07-20 RX ADMIN — TRIAMCINOLONE ACETONIDE 40 MG: 40 INJECTION, SUSPENSION INTRA-ARTICULAR; INTRAMUSCULAR at 09:07

## 2023-08-02 ENCOUNTER — OFFICE VISIT (OUTPATIENT)
Dept: DERMATOLOGY | Facility: CLINIC | Age: 58
End: 2023-08-02
Payer: MEDICARE

## 2023-08-02 DIAGNOSIS — L28.0 LSC (LICHEN SIMPLEX CHRONICUS): Primary | ICD-10-CM

## 2023-08-02 PROCEDURE — 99214 PR OFFICE/OUTPT VISIT, EST, LEVL IV, 30-39 MIN: ICD-10-PCS | Mod: S$PBB,,, | Performed by: DERMATOLOGY

## 2023-08-02 PROCEDURE — 99999 PR PBB SHADOW E&M-EST. PATIENT-LVL II: CPT | Mod: PBBFAC,,,

## 2023-08-02 PROCEDURE — 99212 OFFICE O/P EST SF 10 MIN: CPT | Mod: PBBFAC

## 2023-08-02 PROCEDURE — 99214 OFFICE O/P EST MOD 30 MIN: CPT | Mod: S$PBB,,, | Performed by: DERMATOLOGY

## 2023-08-02 PROCEDURE — 99999 PR PBB SHADOW E&M-EST. PATIENT-LVL II: ICD-10-PCS | Mod: PBBFAC,,,

## 2023-08-02 RX ORDER — CLOBETASOL PROPIONATE 0.5 MG/G
OINTMENT TOPICAL
Qty: 60 G | Refills: 3 | Status: SHIPPED | OUTPATIENT
Start: 2023-08-02 | End: 2023-10-04

## 2023-08-02 NOTE — PROGRESS NOTES
Subjective:      Patient ID:  Van Jones is a 58 y.o. male who presents for   Chief Complaint   Patient presents with    Rash     57yo M with hx sjogren's syndrome on MTX and LSC of L arm presents for initial evaluation of rash to bilateral knees. Reports it began about 15 years ago; since then has become thicker. States it is quite itchy and he picks at it often which causes it occasionally bleed. Has tried numerous OTC creams without improvement. No rash to elbows, scalp, nails, lower abdomen, groin. Denies fevers, chills, joint pain, photosensitivity, oral ulcers.       Rash      Review of Systems   Skin:  Positive for rash.     Objective:   Physical Exam   Constitutional: He appears well-developed and well-nourished. No distress.   Neurological: He is alert and oriented to person, place, and time. He is not disoriented.   Psychiatric: He has a normal mood and affect.   Skin:               Diagram Legend     Erythematous scaling macule/papule c/w actinic keratosis       Vascular papule c/w angioma      Pigmented verrucoid papule/plaque c/w seborrheic keratosis      Yellow umbilicated papule c/w sebaceous hyperplasia      Irregularly shaped tan macule c/w lentigo     1-2 mm smooth white papules consistent with Milia      Movable subcutaneous cyst with punctum c/w epidermal inclusion cyst      Subcutaneous movable cyst c/w pilar cyst      Firm pink to brown papule c/w dermatofibroma      Pedunculated fleshy papule(s) c/w skin tag(s)      Evenly pigmented macule c/w junctional nevus     Mildly variegated pigmented, slightly irregular-bordered macule c/w mildly atypical nevus      Flesh colored to evenly pigmented papule c/w intradermal nevus       Pink pearly papule/plaque c/w basal cell carcinoma      Erythematous hyperkeratotic cursted plaque c/w SCC      Surgical scar with no sign of skin cancer recurrence      Open and closed comedones      Inflammatory papules and pustules      Verrucoid papule  consistent consistent with wart     Erythematous eczematous patches and plaques     Dystrophic onycholytic nail with subungual debris c/w onychomycosis     Umbilicated papule    Erythematous-base heme-crusted tan verrucoid plaque consistent with inflamed seborrheic keratosis     Erythematous Silvery Scaling Plaque c/w Psoriasis     See annotation            Assessment / Plan:        LSC (lichen simplex chronicus)  -   apply clobetasol 0.05% (TEMOVATE) 0.05 % Oint; Apply to affected areas twice per day; do not apply to face or groin  Dispense: 60 g; Refill: 3  -Discussed good skin care regimen including using a mild soap and moisturizing cream 1 - 2 times per day. Limit to one bath/shower per day and use lukewarm (not hot) water.   -Discussed with patient the importance of not manipulating skin lesions. Trauma often exacerbates condition. Trimming nails is recommended to avoid puncturing skin.   -Use ice to relieve intense pruritus.

## 2023-08-09 NOTE — PROGRESS NOTES
CC: Left shoulder pain    DATE OF PROCEDURE: 6/21/2019   Right  1. Shoulder open subpectoral biceps tenodesis (CPT 92015)  2. Shoulder arthroscopic limited debridement (CPT 58028)    3. Shoulder arthroscopic subacromial decompression (CPT 39219)      Van returns today for left shoulder follow up. Patient is requesting left shoulder CSI.  MRI showed evidence of a possible SLAP tear along with intra-articular biceps tendinopathy, undersurface partial rotator cuff tearing, low-grade.  Treatment at this point has been limited.    Prior Hx 7/13/23   58 y.o. Male known to me, previously treated with right shoulder arthroscopy in 2019. He is retired from the [employer]. Complaint of global, left shoulder pain for 3-4 weeks. Patient was fishing for four hours reports pain started afterward.  He is had some pain in the past but nothing to this extent.  Fairly significant.  Feels very similar to what he experienced in his right shoulder prior to surgery.  He is doing well with his right side.  Worse with overhead movement and external rotation (opening door handles). Pain is not disruptive to sleep at night. No relief with rest. Denies neck pain or radicular symptoms. Treatment thus far has included activity modifications, rest, self directed therapy, and oral medication.  Here today to discuss diagnosis and treatment options.     Cervical disc arthroplasty C3-4 by Dr. Noguera in 2018.    PMHx notable for PMHx notable for L leg reflex sympathetic dystrophy - he has used a single crutch for years as a result, PE.  Not currently on any anticoagulants.   Negative for tobacco.   Negative for diabetes.     PAST MEDICAL HISTORY:   Past Medical History:   Diagnosis Date    Broken femur     Deep vein thrombosis     Hyperlipidemia     Immune disorder     Joint pain     Nerve damage left    Pulmonary embolism     Reflex sympathetic dystrophy 1983    Left leg after broken femur surgery     PAST SURGICAL HISTORY:  Past  [facility] ultrasound is ordered per Dr Olson, and pt agrees with plan of care for appt tomorrow. Pt is ok with traveling to [facility] afterwards to go over results.  Pt is transferred to [facility] to set up appt.  12w2d     Surgical History:   Procedure Laterality Date    ARTHROSCOPY OF SHOULDER WITH DECOMPRESSION OF SUBACROMIAL SPACE Right 6/21/2019    Procedure: ARTHROSCOPY, SHOULDER, WITH SUBACROMIAL SPACE DECOMPRESSION;  Surgeon: TALON Brink MD;  Location: [facility] OR 1ST FLR;  Service: Orthopedics;  Laterality: Right;    CERVICAL DISC ARTHROPLASTY  01/23/2018    C3/4    COLONOSCOPY N/A 2/25/2016    Procedure: COLONOSCOPY;  Surgeon: Daryl Viramontes MD;  Location: [facility] ENDO (4TH FLR);  Service: Endoscopy;  Laterality: N/A;  PM Prep      FEMUR SURGERY      HERNIA REPAIR      umbilical    SHOULDER ARTHROSCOPY Right 6/21/2019    Procedure: ARTHROSCOPY, SHOULDER, BICEPS TENODESIS;  Surgeon: TALON Brink MD;  Location: [facility] OR 1ST FLR;  Service: Orthopedics;  Laterality: Right;  REGIONAL W/CATHETER INTERSCALENE  [employer] notified 6-19 LO  [facility]derick/arthrex notified 6-20 LO    SYNOVECTOMY OF SHOULDER Right 6/21/2019    Procedure: SYNOVECTOMY, SHOULDER;  Surgeon: TALON Brink MD;  Location: [facility] OR 1ST FLR;  Service: Orthopedics;  Laterality: Right;    TONSILLECTOMY       FAMILY HISTORY:  Family History   Problem Relation Age of Onset    Heart disease Mother 60    Neuropathy Mother     Heart disease Father 45    Melanoma Neg Hx      MEDICATIONS:    Current Outpatient Medications:     aspirin (ECOTRIN) 81 MG EC tablet, Take 81 mg by mouth once daily., Disp: , Rfl:     folic acid (FOLVITE) 1 MG tablet, TAKE 1 TABLET(1 MG) BY MOUTH EVERY DAY, Disp: 30 tablet, Rfl: 1    metaxalone (SKELAXIN) 800 MG tablet, Take 400-800 mg by mouth daily as needed., Disp: , Rfl:     methotrexate 2.5 MG Tab, 5 tabs weekly, Disp: 75 tablet, Rfl: 1    multivitamin with minerals tablet, Take 1 tablet by mouth once daily., Disp: , Rfl:     rosuvastatin (CRESTOR) 10 MG tablet, Take 1 tablet (10 mg total) by mouth once daily., Disp: 90 tablet, Rfl: 1    sertraline (ZOLOFT) 100 MG tablet, Take 1 tablet (100 mg total) by mouth once daily., Disp:  "90 tablet, Rfl: 1    clobetasol 0.05% (TEMOVATE) 0.05 % Oint, Apply to affected areas twice per day; do not apply to face or groin, Disp: 60 g, Rfl: 3    cyclobenzaprine (FLEXERIL) 5 MG tablet, 5 mg every other day (Patient not taking: Reported on 7/10/2023), Disp: 15 tablet, Rfl: 5    ALLERGIES:  Review of patient's allergies indicates:   Allergen Reactions    Escitalopram oxalate     Cymbalta [duloxetine] Rash     Red rashes on arms and chest    Lamisil [terbinafine hcl] Rash     REVIEW OF SYSTEMS:  Constitution: Negative. Negative for chills, fever and night sweats.    Hematologic/Lymphatic: Negative for bleeding problem. Does not bruise/bleed easily.   Skin: Negative for dry skin, itching and rash.   Musculoskeletal: Negative for falls. Positive for left shoulder pain and muscle weakness.     All other review of symptoms were reviewed and found to be noncontributory.    PHYSICAL EXAMINATION:  Vitals:  /87   Pulse 92   Ht 5' 7" (1.702 m)   Wt 100 kg (220 lb 7.4 oz)   BMI 34.53 kg/m²    General: Well-developed well-nourished 58 y.o. malein no acute distress   Cardiovascular: Regular rhythm by palpation of distal pulse, normal color and temperature, no concerning varicosities on symptomatic side   Lungs: No labored breathing or wheezing appreciated   Neuro: Alert and oriented ×3   Psychiatric: well oriented to person, place and time, demonstrates normal mood and affect   Skin: No rashes, lesions or ulcers, normal temperature, turgor, and texture on uninvolved extremity    Ortho/SPM Exam  Examination of the left shoulder again demonstrates active forward elevation to 150, ER with arm at side to 45, IR to T10. Passive FE to 155, ER to 50.  Pain to palpation over the proximal biceps groove with positive modified speed's test.  More pain specifically over the posterior glenohumeral joint line.  Positive active compression test for pain deep  There also is some tenderness and pain over the Codman's point. "  Negative AC tenderness. 4-/5 resisted supraspinatus testing with pain. 4-/5 resisted infraspinatus testing. Negative ER Lag Sign. Negative belly press test. Stable shoulder. No midline neck tenderness. Negative Spurling's maneuver.     IMAGING:  Xrays including AP, Outlet and Axillary Lateral of Left shoulder are ordered / images reviewed by me:   No significant DJD    Left Shoulder MRI 7/18/23:  1. Ill-defined signal increase throughout the substance of the anterosuperior glenoid labrum compatible with labral tear.  2. Moderate supraspinatus tendinosis with possible small partial thickness insertional tear.  3. Mild tendinosis of the intra-articular portion of the long head of the biceps tendon.    ASSESSMENT:      ICD-10-CM ICD-9-CM   1. Biceps tendinopathy, left  M67.922 727.9   2. Incomplete tear of left rotator cuff, unspecified whether traumatic  M75.112 840.4   3. Degenerative superior labral anterior-to-posterior (SLAP) tear of left shoulder  S43.432A 840.7   4. Chronic left shoulder pain  M25.512 719.41    G89.29 338.29       PLAN:     Left shoulder glenohumeral steroid injection given.  Discussed activities to avoid.  Self-directed therapy program.  We will see how he does with conservative treatment.  If pain is persistent or recurrent despite these measures, next step would be consideration for arthroscopy and biceps tenodesis.    Large Joint Aspiration/Injection: L glenohumeral    Date/Time: 7/20/2023 9:15 AM    Performed by: TALON Brink MD  Authorized by: TALON Brink MD    Consent Done?:  Yes (Verbal)  Indications:  Pain  Site marked: the procedure site was marked    Timeout: prior to procedure the correct patient, procedure, and site was verified    Prep: patient was prepped and draped in usual sterile fashion      Local anesthesia used?: Yes    Local anesthetic:  Co-phenylcaine spray (0.2% Naropin)  Anesthetic total (ml):  4      Details:  Needle Size:  22 G  Approach:   Superior  Location:  Shoulder  Site:  L glenohumeral  Medications:  40 mg triamcinolone acetonide 40 mg/mL  Patient tolerance:  Patient tolerated the procedure well with no immediate complications

## 2023-08-14 RX ORDER — TRIAMCINOLONE ACETONIDE 40 MG/ML
40 INJECTION, SUSPENSION INTRA-ARTICULAR; INTRAMUSCULAR
Status: DISCONTINUED | OUTPATIENT
Start: 2023-07-20 | End: 2023-08-14 | Stop reason: HOSPADM

## 2023-09-07 DIAGNOSIS — G89.29 CHRONIC LEFT SHOULDER PAIN: ICD-10-CM

## 2023-09-07 DIAGNOSIS — M25.512 CHRONIC LEFT SHOULDER PAIN: ICD-10-CM

## 2023-09-07 RX ORDER — METAXALONE 800 MG/1
TABLET ORAL
Qty: 30 TABLET | Refills: 1 | Status: SHIPPED | OUTPATIENT
Start: 2023-09-07 | End: 2023-11-29

## 2023-09-12 ENCOUNTER — TELEPHONE (OUTPATIENT)
Dept: SPORTS MEDICINE | Facility: CLINIC | Age: 58
End: 2023-09-12
Payer: MEDICARE

## 2023-09-12 NOTE — TELEPHONE ENCOUNTER
Spoke to Gerry. No PT orders were sent because patient agreed to self directed therapy program at appointment on 7/20/23. Will discuss with Dr. Brink and follow-up with patient and [facility].     Noemy Walker [facility]  Sports Medicine Assistant - Dr. Anthony Brink   [facility]      ----- Message from Aundrea Rodriguez sent at 9/12/2023  9:18 AM CDT -----  Regarding: order  Contact: [phone]  Gerry w/[facility] calling regarding Orders (message) for #PT. Please call    Fax: [phone]

## 2023-09-13 ENCOUNTER — IMMUNIZATION (OUTPATIENT)
Dept: FAMILY MEDICINE | Facility: CLINIC | Age: 58
End: 2023-09-13
Payer: MEDICARE

## 2023-09-13 PROCEDURE — 99999PBSHW FLU VACCINE (QUAD) GREATER THAN OR EQUAL TO 3YO PRESERVATIVE FREE IM: ICD-10-PCS | Mod: PBBFAC,,,

## 2023-09-13 PROCEDURE — 99999PBSHW FLU VACCINE (QUAD) GREATER THAN OR EQUAL TO 3YO PRESERVATIVE FREE IM: Mod: PBBFAC,,,

## 2023-09-13 PROCEDURE — G0008 ADMIN INFLUENZA VIRUS VAC: HCPCS | Mod: PBBFAC,PO

## 2023-09-15 ENCOUNTER — PATIENT MESSAGE (OUTPATIENT)
Dept: RHEUMATOLOGY | Facility: CLINIC | Age: 58
End: 2023-09-15
Payer: MEDICARE

## 2023-09-15 ENCOUNTER — TELEPHONE (OUTPATIENT)
Dept: SPORTS MEDICINE | Facility: CLINIC | Age: 58
End: 2023-09-15
Payer: MEDICARE

## 2023-09-15 DIAGNOSIS — M67.922 BICEPS TENDINOPATHY, LEFT: Primary | ICD-10-CM

## 2023-09-15 NOTE — TELEPHONE ENCOUNTER
Patient requesting formal physical therapy.     Noemy Walker ATC, OT  Sports Medicine Assistant - Dr. Anthony Brink   [facility]      ----- Message -----  From: Alesia Weems  Sent: 9/15/2023  11:39 AM CDT  To: Keena Sebastian Staff  Subject: order                                            Pt calling in need of an order for physical therapy, sent it [facility] [phone] no fax # given.   Pt call back [phone]

## 2023-09-19 ENCOUNTER — TELEPHONE (OUTPATIENT)
Dept: SPORTS MEDICINE | Facility: CLINIC | Age: 58
End: 2023-09-19
Payer: MEDICARE

## 2023-09-19 NOTE — TELEPHONE ENCOUNTER
Tried calling patient this morning to let him know I faxed his PT order to [facility]. No answer and voicemail was not set up.

## 2023-09-19 NOTE — TELEPHONE ENCOUNTER
----- Message from Aundrea Rodriguez sent at 9/19/2023  8:51 AM CDT -----  Regarding: order  Contact: [phone]  Van Couvillion calling regarding Orders (message) for #PT. He would like it to be sent to [facility] in [facility]. He starts therapy tomorrow.  Please call    Phone: [phone]

## 2023-10-04 ENCOUNTER — OFFICE VISIT (OUTPATIENT)
Dept: CARDIOLOGY | Facility: CLINIC | Age: 58
End: 2023-10-04
Payer: MEDICARE

## 2023-10-04 VITALS
WEIGHT: 200 LBS | BODY MASS INDEX: 31.39 KG/M2 | SYSTOLIC BLOOD PRESSURE: 106 MMHG | HEIGHT: 67 IN | HEART RATE: 92 BPM | DIASTOLIC BLOOD PRESSURE: 75 MMHG

## 2023-10-04 DIAGNOSIS — Z86.711 HISTORY OF PULMONARY EMBOLISM: ICD-10-CM

## 2023-10-04 DIAGNOSIS — E78.5 HYPERLIPIDEMIA, UNSPECIFIED HYPERLIPIDEMIA TYPE: Primary | ICD-10-CM

## 2023-10-04 DIAGNOSIS — I77.9 DISORDER OF CAROTID ARTERY: ICD-10-CM

## 2023-10-04 PROCEDURE — 99204 PR OFFICE/OUTPT VISIT, NEW, LEVL IV, 45-59 MIN: ICD-10-PCS | Mod: S$PBB,25,, | Performed by: INTERNAL MEDICINE

## 2023-10-04 PROCEDURE — 93005 ELECTROCARDIOGRAM TRACING: CPT

## 2023-10-04 PROCEDURE — 99204 OFFICE O/P NEW MOD 45 MIN: CPT | Mod: S$PBB,25,, | Performed by: INTERNAL MEDICINE

## 2023-10-04 PROCEDURE — 93010 ELECTROCARDIOGRAM REPORT: CPT | Mod: ,,, | Performed by: INTERNAL MEDICINE

## 2023-10-04 PROCEDURE — 99999 PR PBB SHADOW E&M-EST. PATIENT-LVL III: ICD-10-PCS | Mod: PBBFAC,,, | Performed by: INTERNAL MEDICINE

## 2023-10-04 PROCEDURE — 93010 EKG 12-LEAD: ICD-10-PCS | Mod: ,,, | Performed by: INTERNAL MEDICINE

## 2023-10-04 PROCEDURE — 99213 OFFICE O/P EST LOW 20 MIN: CPT | Mod: PBBFAC | Performed by: INTERNAL MEDICINE

## 2023-10-04 PROCEDURE — 99999 PR PBB SHADOW E&M-EST. PATIENT-LVL III: CPT | Mod: PBBFAC,,, | Performed by: INTERNAL MEDICINE

## 2023-10-04 RX ORDER — ROSUVASTATIN CALCIUM 20 MG/1
20 TABLET, COATED ORAL DAILY
Qty: 90 TABLET | Refills: 3 | Status: SHIPPED | OUTPATIENT
Start: 2023-10-04 | End: 2024-09-28
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
## 2023-10-05 ENCOUNTER — OFFICE VISIT (OUTPATIENT)
Dept: INTERNAL MEDICINE | Facility: CLINIC | Age: 58
End: 2023-10-05
Payer: MEDICARE

## 2023-10-05 VITALS
DIASTOLIC BLOOD PRESSURE: 70 MMHG | HEART RATE: 68 BPM | WEIGHT: 201.94 LBS | BODY MASS INDEX: 31.7 KG/M2 | SYSTOLIC BLOOD PRESSURE: 118 MMHG | HEIGHT: 67 IN

## 2023-10-05 DIAGNOSIS — Z23 NEED FOR PNEUMOCOCCAL VACCINATION: ICD-10-CM

## 2023-10-05 DIAGNOSIS — F51.01 PRIMARY INSOMNIA: ICD-10-CM

## 2023-10-05 DIAGNOSIS — E78.5 HYPERLIPIDEMIA, UNSPECIFIED HYPERLIPIDEMIA TYPE: ICD-10-CM

## 2023-10-05 DIAGNOSIS — F32.A DEPRESSION, UNSPECIFIED DEPRESSION TYPE: ICD-10-CM

## 2023-10-05 DIAGNOSIS — R73.09 ELEVATED GLUCOSE: ICD-10-CM

## 2023-10-05 DIAGNOSIS — Z76.89 ENCOUNTER TO ESTABLISH CARE: Primary | ICD-10-CM

## 2023-10-05 PROCEDURE — 99999PBSHW PNEUMOCOCCAL CONJUGATE VACCINE 20-VALENT: Mod: PBBFAC,,,

## 2023-10-05 PROCEDURE — 99999 PR PBB SHADOW E&M-EST. PATIENT-LVL III: CPT | Mod: PBBFAC,GC,,

## 2023-10-05 PROCEDURE — 99213 OFFICE O/P EST LOW 20 MIN: CPT | Mod: PBBFAC

## 2023-10-05 PROCEDURE — 99396 PREV VISIT EST AGE 40-64: CPT | Mod: S$PBB,GC,,

## 2023-10-05 PROCEDURE — 99999PBSHW PNEUMOCOCCAL CONJUGATE VACCINE 20-VALENT: ICD-10-PCS | Mod: PBBFAC,,,

## 2023-10-05 PROCEDURE — 99999 PR PBB SHADOW E&M-EST. PATIENT-LVL III: ICD-10-PCS | Mod: PBBFAC,GC,,

## 2023-10-05 PROCEDURE — 99396 PR PREVENTIVE VISIT,EST,40-64: ICD-10-PCS | Mod: S$PBB,GC,,

## 2023-10-05 PROCEDURE — 90677 PCV20 VACCINE IM: CPT | Mod: PBBFAC

## 2023-10-05 NOTE — PROGRESS NOTES
58M with Sjogrens on MTX, HLD presenting to establish care. He feels well.    HCM - update pneumococcal vaccine today.     Depression - symptoms well-controlled on sertraline    HLD - on statin; follows with cardiology for this    Sjogrens - continue to follow with rheumatology
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
## 2023-10-05 NOTE — PROGRESS NOTES
I have reviewed the notes, assessments, and/or procedures performed by Dr. Cohn, I concur with her/his documentation of Van Jones.

## 2023-10-10 RX ORDER — ROSUVASTATIN CALCIUM 10 MG/1
10 TABLET, COATED ORAL
Qty: 90 TABLET | Refills: 3 | Status: SHIPPED | OUTPATIENT
Start: 2023-10-10 | End: 2023-11-29

## 2023-10-16 RX ORDER — FOLIC ACID 1 MG/1
TABLET ORAL
Qty: 30 TABLET | Refills: 1 | Status: SHIPPED | OUTPATIENT
Start: 2023-10-16 | End: 2023-12-27

## 2023-10-31 ENCOUNTER — OFFICE VISIT (OUTPATIENT)
Dept: SPORTS MEDICINE | Facility: CLINIC | Age: 58
End: 2023-10-31
Payer: MEDICARE

## 2023-10-31 ENCOUNTER — PATIENT MESSAGE (OUTPATIENT)
Dept: SPORTS MEDICINE | Facility: CLINIC | Age: 58
End: 2023-10-31

## 2023-10-31 VITALS
HEART RATE: 76 BPM | SYSTOLIC BLOOD PRESSURE: 146 MMHG | DIASTOLIC BLOOD PRESSURE: 92 MMHG | BODY MASS INDEX: 31.83 KG/M2 | WEIGHT: 202.81 LBS | HEIGHT: 67 IN

## 2023-10-31 DIAGNOSIS — M67.922 BICEPS TENDINOPATHY, LEFT: Primary | ICD-10-CM

## 2023-10-31 DIAGNOSIS — M75.112 NONTRAUMATIC INCOMPLETE TEAR OF LEFT ROTATOR CUFF: ICD-10-CM

## 2023-10-31 DIAGNOSIS — M75.22 BICEPS TENDINITIS OF LEFT UPPER EXTREMITY: ICD-10-CM

## 2023-10-31 DIAGNOSIS — S43.432A DEGENERATIVE SUPERIOR LABRAL ANTERIOR-TO-POSTERIOR (SLAP) TEAR OF LEFT SHOULDER: ICD-10-CM

## 2023-10-31 DIAGNOSIS — S43.432A SUPERIOR GLENOID LABRUM LESION OF LEFT SHOULDER, INITIAL ENCOUNTER: Primary | ICD-10-CM

## 2023-10-31 DIAGNOSIS — M25.512 CHRONIC LEFT SHOULDER PAIN: ICD-10-CM

## 2023-10-31 DIAGNOSIS — M75.102 NONTRAUMATIC TEAR OF LEFT ROTATOR CUFF, UNSPECIFIED TEAR EXTENT: ICD-10-CM

## 2023-10-31 DIAGNOSIS — G89.29 CHRONIC LEFT SHOULDER PAIN: ICD-10-CM

## 2023-10-31 PROCEDURE — 99213 OFFICE O/P EST LOW 20 MIN: CPT | Mod: PBBFAC | Performed by: ORTHOPAEDIC SURGERY

## 2023-10-31 PROCEDURE — 99215 OFFICE O/P EST HI 40 MIN: CPT | Mod: S$PBB,,, | Performed by: ORTHOPAEDIC SURGERY

## 2023-10-31 PROCEDURE — 99215 PR OFFICE/OUTPT VISIT, EST, LEVL V, 40-54 MIN: ICD-10-PCS | Mod: S$PBB,,, | Performed by: ORTHOPAEDIC SURGERY

## 2023-10-31 PROCEDURE — 99999 PR PBB SHADOW E&M-EST. PATIENT-LVL III: ICD-10-PCS | Mod: PBBFAC,,, | Performed by: ORTHOPAEDIC SURGERY

## 2023-10-31 PROCEDURE — 99999 PR PBB SHADOW E&M-EST. PATIENT-LVL III: CPT | Mod: PBBFAC,,, | Performed by: ORTHOPAEDIC SURGERY

## 2023-10-31 NOTE — PROGRESS NOTES
CC: Left shoulder pain    DATE OF PROCEDURE: 6/21/2019   Right  1. Shoulder open subpectoral biceps tenodesis (CPT 57447)  2. Shoulder arthroscopic limited debridement (CPT 52843)    3. Shoulder arthroscopic subacromial decompression (CPT 92267)      Van returns back to clinic for follow up left shoulder.  States the right shoulder is doing well.  Complaint is chronic recurrent left shoulder pain which he localizes over the anterior proximal biceps groove region and also deep within the shoulder.  Anterior superior and posterior superior.  Does have some mechanical symptoms.  Prior MRI showed a possible SLAP tear and intra-articular biceps tendinopathy along with some undersurface partial-thickness rotator cuff tearing.  Prior conservative treatment for that shoulder has included corticosteroid injection, therapy, oral medication.  This has been a longstanding issue for him and at this point he would like to proceed with arthroscopic intervention.  Current pain bothers him on a daily basis.  Limits overhead activity.  Also bothersome with the lies on his left side at night.  Feels very similar to what he had on the contralateral side.    Prior Hx 7/20/23:  Van returns today for left shoulder follow up. Patient is requesting left shoulder CSI.  MRI showed evidence of a possible SLAP tear along with intra-articular biceps tendinopathy, undersurface partial rotator cuff tearing, low-grade.  Treatment at this point has been limited.    Prior Hx 7/13/23   58 y.o. Male known to me, previously treated with right shoulder arthroscopy in 2019. He is retired from [employer]. Complaint of global, left shoulder pain for 3-4 weeks. Patient was fishing for four hours reports pain started afterward.  He is had some pain in the past but nothing to this extent.  Fairly significant.  Feels very similar to what he experienced in his right shoulder prior to surgery.  He is doing well with his right side.  Worse with overhead  movement and external rotation (opening door handles). Pain is not disruptive to sleep at night. No relief with rest. Denies neck pain or radicular symptoms. Treatment thus far has included activity modifications, rest, self directed therapy, and oral medication.  Here today to discuss diagnosis and treatment options.     Cervical disc arthroplasty C3-4 by Dr. Noguera in 2018.    PMHx notable for PMHx notable for L leg reflex sympathetic dystrophy - he has used a single crutch for years as a result, PE.  Not currently on any anticoagulants.   Negative for tobacco.   Negative for diabetes.     PAST MEDICAL HISTORY:   Past Medical History:   Diagnosis Date    Broken femur     Deep vein thrombosis     Hyperlipidemia     Immune disorder     Joint pain     Nerve damage left    Pulmonary embolism     Reflex sympathetic dystrophy 1983    Left leg after broken femur surgery     PAST SURGICAL HISTORY:  Past Surgical History:   Procedure Laterality Date    ARTHROSCOPY OF SHOULDER WITH DECOMPRESSION OF SUBACROMIAL SPACE Right 6/21/2019    Procedure: ARTHROSCOPY, SHOULDER, WITH SUBACROMIAL SPACE DECOMPRESSION;  Surgeon: TALON Brink MD;  Location: [facility] OR [address] [place] [address];  Service: Orthopedics;  Laterality: Right;    CERVICAL DISC ARTHROPLASTY  01/23/2018    C3/4    COLONOSCOPY N/A 2/25/2016    Procedure: COLONOSCOPY;  Surgeon: Daryl Viramontes MD;  Location: [facility] (4TH FLR);  Service: Endoscopy;  Laterality: N/A;  PM Prep      FEMUR SURGERY      HERNIA REPAIR      umbilical    SHOULDER ARTHROSCOPY Right 6/21/2019    Procedure: ARTHROSCOPY, SHOULDER, BICEPS TENODESIS;  Surgeon: TALON Brink MD;  Location: [facility] OR [address] [place] [address];  Service: Orthopedics;  Laterality: Right;  REGIONAL W/CATHETER INTERSCALENE  [employer] notified 6-19 LO  [facility]derick/arthrex notified 6-20 LO    SYNOVECTOMY OF SHOULDER Right 6/21/2019    Procedure: SYNOVECTOMY, SHOULDER;  Surgeon: TALON Brink MD;  Location: [facility] OR [address] [place] [address];  Service: Orthopedics;   "Laterality: Right;    TONSILLECTOMY       FAMILY HISTORY:  Family History   Problem Relation Age of Onset    Heart disease Mother 60    Neuropathy Mother     Heart disease Father 45    Melanoma Neg Hx      MEDICATIONS:    Current Outpatient Medications:     aspirin (ECOTRIN) 81 MG EC tablet, Take 81 mg by mouth once daily., Disp: , Rfl:     folic acid (FOLVITE) 1 MG tablet, TAKE 1 TABLET(1 MG) BY MOUTH EVERY DAY, Disp: 30 tablet, Rfl: 1    metaxalone (SKELAXIN) 800 MG tablet, TAKE ONE-HALF TO 1 TABLET BY MOUTH DAILY AS NEEDED, Disp: 30 tablet, Rfl: 1    methotrexate 2.5 MG Tab, 5 tabs weekly, Disp: 75 tablet, Rfl: 1    multivitamin with minerals tablet, Take 1 tablet by mouth once daily., Disp: , Rfl:     rosuvastatin (CRESTOR) 10 MG tablet, TAKE 1 TABLET(10 MG) BY MOUTH EVERY DAY, Disp: 90 tablet, Rfl: 3    rosuvastatin (CRESTOR) 20 MG tablet, Take 1 tablet (20 mg total) by mouth once daily., Disp: 90 tablet, Rfl: 3    varicella-zoster gE-AS01B, PF, (SHINGRIX) 50 mcg/0.5 mL injection, Inject into the muscle., Disp: 1 each, Rfl: 1    cyclobenzaprine (FLEXERIL) 5 MG tablet, TAKE 1 TABLET BY MOUTH EVERY OTHER DAY, Disp: 15 tablet, Rfl: 5    sertraline (ZOLOFT) 100 MG tablet, Take 1 tablet (100 mg total) by mouth once daily., Disp: 90 tablet, Rfl: 1    ALLERGIES:  Review of patient's allergies indicates:   Allergen Reactions    Escitalopram oxalate     Cymbalta [duloxetine] Rash     Red rashes on arms and chest    Lamisil [terbinafine hcl] Rash     REVIEW OF SYSTEMS:  Constitution: Negative. Negative for chills, fever and night sweats.    Hematologic/Lymphatic: Negative for bleeding problem. Does not bruise/bleed easily.   Skin: Negative for dry skin, itching and rash.   Musculoskeletal: Negative for falls. Positive for left shoulder pain and muscle weakness.     All other review of symptoms were reviewed and found to be noncontributory.    PHYSICAL EXAMINATION:  Vitals:  BP (!) 146/92   Pulse 76   Ht 5' 7" (1.702 m) "   Wt 92 kg (202 lb 13.2 oz)   BMI 31.77 kg/m²    General: Well-developed well-nourished 58 y.o. malein no acute distress   Cardiovascular: Regular rhythm by palpation of distal pulse, normal color and temperature, no concerning varicosities on symptomatic side   Lungs: No labored breathing or wheezing appreciated   Neuro: Alert and oriented ×3   Psychiatric: well oriented to person, place and time, demonstrates normal mood and affect   Skin: No rashes, lesions or ulcers, normal temperature, turgor, and texture on uninvolved extremity    Ortho/SPM Exam  Examination of the left shoulder again demonstrates active forward elevation to 150, ER with arm at side to 45, IR to T10. Passive FE to 170, ER to 60.  Pain to palpation over the proximal biceps groove with positive modified speed's test.  More pain specifically over the posterior glenohumeral joint line.  Positive active compression test for pain deep  There also is some tenderness and pain over the Codman's point.  Negative AC tenderness. 4+/5 resisted supraspinatus testing. 4+/5 resisted infraspinatus testing. Negative ER Lag Sign. Negative belly press test. Stable shoulder. No midline neck tenderness. Negative Spurling's maneuver.     IMAGING:  Prior Xrays including AP, Outlet and Axillary Lateral of Left shoulder are ordered / images reviewed by me:   No significant DJD    Left Shoulder MRI 7/18/23:  1. Ill-defined signal increase throughout the substance of the anterosuperior glenoid labrum compatible with labral tear.  2. Moderate supraspinatus tendinosis with possible small partial thickness insertional tear.  3. Mild tendinosis of the intra-articular portion of the long head of the biceps tendon.    ASSESSMENT:      ICD-10-CM ICD-9-CM   1. Biceps tendinopathy, left  M67.922 727.9   2. Nontraumatic incomplete tear of left rotator cuff  M75.112 726.13   3. Degenerative superior labral anterior-to-posterior (SLAP) tear of left shoulder  S43.432A 840.7   4.  Chronic left shoulder pain  M25.512 719.41    G89.29 338.29     PLAN:     Chronic recurrent left shoulder pain.  Very similar to what he experienced on the contralateral side.  Continued symptoms despite extensive conservative treatment.  Treatment options discussed to include arthroscopy.  The details of arthroscopic intervention were reviewed to include the expected postop rehab and recovery course.  Outlined risks include but are not limited to continued or recurrent pain, stiffness, biceps deformity, weakness, tissue nonhealing re-tear, neurovascular injury among others.  I expect him to do well.  All questions answered.  He would like to proceed with surgery.    Plan is Left shoulder arthroscopic extensive debridement, open subpectoral biceps tenodesis, possible subacromial decompression.  Plan to assess the rotator cuff intraoperatively for debridement versus repair but I suspect this will be a debridement as was done on the contralateral side.    Preop clearance per Cardiology    Plan for postoperative ASA + home SCD's x 2 weeks for DVT prophylaxis.  Patient did well following previous right shoulder surgery with that prophylactic plan.    Informed Consent:    The details of the surgical procedure were explained, including the location of probable incisions and a description of possible hardware and/or grafts to be used. Alternatives to both operative and non-operative options with associated risks and benefits were discussed. The patient understands the likely convalescence after surgery and, in particular, the expected postop rehab and recovery course. The outlined risks and potential complications of the proposed procedure include but are not limited to: infection, poor wound healing, scarring, deformity, stiffness, swelling, continued or recurrent pain, instability, hardware or prosthetic failure if implanted, symptomatic hardware requiring removal, dislocation, weakness, neurovascular injury, numbness,  chronic regional pain disorder, tissue nonhealing/irreparability/retear, subsequent contralateral limb injury or pathology, chondral injury, arthritis, fracture, blood clot formation, inability to return to previous level of activity, anesthetic or regional block complication up to death, need for additional procedure as indicated intraoperatively, and potential need for further surgery.    The patient was also informed and understands that the risks of surgery are greater for patients with a current condition or history of heart disease, obesity, clotting disorders, recurrent infections, steroid use, current or past smoking, and factors such as sedentary lifestyle and noncompliance with medications, therapy or follow-up. The degree of the increased risk is hard to estimate with any degree of precision. If applicable, smoking cessation was discussed.     All questions were answered. The patient has verbalized understanding of these issues and wishes to proceed with the surgery as discussed.      Procedures

## 2023-11-04 RX ORDER — CYCLOBENZAPRINE HCL 5 MG
TABLET ORAL
Qty: 15 TABLET | Refills: 5 | Status: SHIPPED | OUTPATIENT
Start: 2023-11-04 | End: 2023-11-29

## 2023-11-08 ENCOUNTER — PATIENT MESSAGE (OUTPATIENT)
Dept: SURGERY | Facility: HOSPITAL | Age: 58
End: 2023-11-08
Payer: MEDICARE

## 2023-11-08 ENCOUNTER — TELEPHONE (OUTPATIENT)
Dept: SPORTS MEDICINE | Facility: CLINIC | Age: 58
End: 2023-11-08
Payer: MEDICARE

## 2023-11-08 RX ORDER — TRAMADOL HYDROCHLORIDE 50 MG/1
50 TABLET ORAL EVERY 6 HOURS
Qty: 10 TABLET | Refills: 0 | Status: SHIPPED | OUTPATIENT
Start: 2023-11-08 | End: 2023-11-29

## 2023-11-08 NOTE — TELEPHONE ENCOUNTER
Called patient in regard to message about shoulder pain and medication. NSAIDS contraindicated in the setting of his cardiac issues. Will prescribe a small prescription of Tramadol for pain. Instructed him to alternate with Tylenol. Patient verbalized understanding.

## 2023-11-15 RX ORDER — MELOXICAM 15 MG/1
15 TABLET ORAL DAILY
Qty: 21 TABLET | Refills: 0 | Status: SHIPPED | OUTPATIENT
Start: 2023-11-15 | End: 2023-11-29 | Stop reason: SDUPTHER

## 2023-11-16 ENCOUNTER — PATIENT MESSAGE (OUTPATIENT)
Dept: RHEUMATOLOGY | Facility: CLINIC | Age: 58
End: 2023-11-16
Payer: MEDICARE

## 2023-11-28 DIAGNOSIS — M35.00 SJOGREN'S SYNDROME, WITH UNSPECIFIED ORGAN INVOLVEMENT: ICD-10-CM

## 2023-11-28 RX ORDER — METHOTREXATE 2.5 MG/1
TABLET ORAL
Qty: 75 TABLET | Refills: 1 | Status: SHIPPED | OUTPATIENT
Start: 2023-11-28

## 2023-11-29 ENCOUNTER — OFFICE VISIT (OUTPATIENT)
Dept: RHEUMATOLOGY | Facility: CLINIC | Age: 58
End: 2023-11-29
Payer: MEDICARE

## 2023-11-29 DIAGNOSIS — F33.1 MODERATE EPISODE OF RECURRENT MAJOR DEPRESSIVE DISORDER: ICD-10-CM

## 2023-11-29 DIAGNOSIS — M35.00 SJOGREN'S SYNDROME, WITH UNSPECIFIED ORGAN INVOLVEMENT: ICD-10-CM

## 2023-11-29 DIAGNOSIS — Z79.899 DRUG-INDUCED IMMUNODEFICIENCY: Primary | ICD-10-CM

## 2023-11-29 DIAGNOSIS — D84.821 DRUG-INDUCED IMMUNODEFICIENCY: Primary | ICD-10-CM

## 2023-11-29 PROCEDURE — 99214 OFFICE O/P EST MOD 30 MIN: CPT | Mod: 95,,, | Performed by: INTERNAL MEDICINE

## 2023-11-29 PROCEDURE — 99214 PR OFFICE/OUTPT VISIT, EST, LEVL IV, 30-39 MIN: ICD-10-PCS | Mod: 95,,, | Performed by: INTERNAL MEDICINE

## 2023-11-29 RX ORDER — MELOXICAM 15 MG/1
15 TABLET ORAL DAILY PRN
Qty: 30 TABLET | Refills: 1 | Status: SHIPPED | OUTPATIENT
Start: 2023-11-29 | End: 2024-01-01

## 2023-11-29 RX ORDER — SERTRALINE HYDROCHLORIDE 100 MG/1
100 TABLET, FILM COATED ORAL DAILY
Qty: 90 TABLET | Refills: 1 | Status: SHIPPED | OUTPATIENT
Start: 2023-11-29 | End: 2024-05-27
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
## 2023-12-04 ENCOUNTER — LAB VISIT (OUTPATIENT)
Dept: LAB | Facility: HOSPITAL | Age: 58
End: 2023-12-04
Attending: INTERNAL MEDICINE
Payer: MEDICARE

## 2023-12-04 DIAGNOSIS — Z76.89 ENCOUNTER TO ESTABLISH CARE: ICD-10-CM

## 2023-12-04 DIAGNOSIS — M35.00 SJOGREN'S SYNDROME, WITH UNSPECIFIED ORGAN INVOLVEMENT: ICD-10-CM

## 2023-12-04 DIAGNOSIS — R73.09 ELEVATED GLUCOSE: ICD-10-CM

## 2023-12-04 LAB
CREAT UR-MCNC: 343 MG/DL (ref 23–375)
ESTIMATED AVG GLUCOSE: 111 MG/DL (ref 68–131)
HBA1C MFR BLD: 5.5 % (ref 4–5.6)
HIV 1+2 AB+HIV1 P24 AG SERPL QL IA: NORMAL
PROT UR-MCNC: 23 MG/DL (ref 0–15)
PROT/CREAT UR: 0.07 MG/G{CREAT} (ref 0–0.2)

## 2023-12-04 PROCEDURE — 84156 ASSAY OF PROTEIN URINE: CPT | Performed by: INTERNAL MEDICINE

## 2023-12-04 PROCEDURE — 36415 COLL VENOUS BLD VENIPUNCTURE: CPT | Mod: PO | Performed by: INTERNAL MEDICINE

## 2023-12-04 PROCEDURE — 83036 HEMOGLOBIN GLYCOSYLATED A1C: CPT

## 2023-12-04 PROCEDURE — 86225 DNA ANTIBODY NATIVE: CPT | Performed by: INTERNAL MEDICINE

## 2023-12-04 PROCEDURE — 82595 ASSAY OF CRYOGLOBULIN: CPT | Performed by: INTERNAL MEDICINE

## 2023-12-04 PROCEDURE — 87389 HIV-1 AG W/HIV-1&-2 AB AG IA: CPT

## 2023-12-05 LAB — DSDNA AB SER-ACNC: NORMAL [IU]/ML

## 2023-12-06 ENCOUNTER — OFFICE VISIT (OUTPATIENT)
Dept: INTERNAL MEDICINE | Facility: CLINIC | Age: 58
End: 2023-12-06
Payer: MEDICARE

## 2023-12-06 ENCOUNTER — LAB VISIT (OUTPATIENT)
Dept: LAB | Facility: HOSPITAL | Age: 58
End: 2023-12-06
Payer: MEDICARE

## 2023-12-06 VITALS
HEART RATE: 60 BPM | HEIGHT: 67 IN | BODY MASS INDEX: 32.8 KG/M2 | WEIGHT: 209 LBS | SYSTOLIC BLOOD PRESSURE: 124 MMHG | OXYGEN SATURATION: 98 % | DIASTOLIC BLOOD PRESSURE: 84 MMHG

## 2023-12-06 DIAGNOSIS — Z01.818 PREOP GENERAL PHYSICAL EXAM: ICD-10-CM

## 2023-12-06 DIAGNOSIS — Z76.89 ENCOUNTER TO ESTABLISH CARE WITH NEW DOCTOR: Primary | ICD-10-CM

## 2023-12-06 DIAGNOSIS — M25.519 CHRONIC SHOULDER PAIN, UNSPECIFIED LATERALITY: ICD-10-CM

## 2023-12-06 DIAGNOSIS — G89.29 CHRONIC SHOULDER PAIN, UNSPECIFIED LATERALITY: ICD-10-CM

## 2023-12-06 DIAGNOSIS — Z76.89 ENCOUNTER TO ESTABLISH CARE WITH NEW DOCTOR: ICD-10-CM

## 2023-12-06 LAB
ALBUMIN SERPL BCP-MCNC: 4.5 G/DL (ref 3.5–5.2)
ALP SERPL-CCNC: 57 U/L (ref 55–135)
ALT SERPL W/O P-5'-P-CCNC: 30 U/L (ref 10–44)
ANION GAP SERPL CALC-SCNC: 11 MMOL/L (ref 8–16)
AST SERPL-CCNC: 25 U/L (ref 10–40)
BASOPHILS # BLD AUTO: 0.07 K/UL (ref 0–0.2)
BASOPHILS NFR BLD: 0.9 % (ref 0–1.9)
BILIRUB SERPL-MCNC: 0.5 MG/DL (ref 0.1–1)
BUN SERPL-MCNC: 12 MG/DL (ref 6–20)
CALCIUM SERPL-MCNC: 9.9 MG/DL (ref 8.7–10.5)
CHLORIDE SERPL-SCNC: 104 MMOL/L (ref 95–110)
CO2 SERPL-SCNC: 27 MMOL/L (ref 23–29)
CREAT SERPL-MCNC: 1 MG/DL (ref 0.5–1.4)
DIFFERENTIAL METHOD: NORMAL
EOSINOPHIL # BLD AUTO: 0.3 K/UL (ref 0–0.5)
EOSINOPHIL NFR BLD: 3.1 % (ref 0–8)
ERYTHROCYTE [DISTWIDTH] IN BLOOD BY AUTOMATED COUNT: 12.7 % (ref 11.5–14.5)
EST. GFR  (NO RACE VARIABLE): >60 ML/MIN/1.73 M^2
GLUCOSE SERPL-MCNC: 78 MG/DL (ref 70–110)
HCT VFR BLD AUTO: 45.6 % (ref 40–54)
HGB BLD-MCNC: 15.1 G/DL (ref 14–18)
IMM GRANULOCYTES # BLD AUTO: 0.02 K/UL (ref 0–0.04)
IMM GRANULOCYTES NFR BLD AUTO: 0.2 % (ref 0–0.5)
LYMPHOCYTES # BLD AUTO: 2 K/UL (ref 1–4.8)
LYMPHOCYTES NFR BLD: 25.2 % (ref 18–48)
MCH RBC QN AUTO: 29.7 PG (ref 27–31)
MCHC RBC AUTO-ENTMCNC: 33.1 G/DL (ref 32–36)
MCV RBC AUTO: 90 FL (ref 82–98)
MONOCYTES # BLD AUTO: 0.6 K/UL (ref 0.3–1)
MONOCYTES NFR BLD: 7.3 % (ref 4–15)
NEUTROPHILS # BLD AUTO: 5.1 K/UL (ref 1.8–7.7)
NEUTROPHILS NFR BLD: 63.3 % (ref 38–73)
NRBC BLD-RTO: 0 /100 WBC
PLATELET # BLD AUTO: 222 K/UL (ref 150–450)
PMV BLD AUTO: 10.9 FL (ref 9.2–12.9)
POTASSIUM SERPL-SCNC: 3.9 MMOL/L (ref 3.5–5.1)
PROT SERPL-MCNC: 7.5 G/DL (ref 6–8.4)
RBC # BLD AUTO: 5.09 M/UL (ref 4.6–6.2)
SODIUM SERPL-SCNC: 142 MMOL/L (ref 136–145)
WBC # BLD AUTO: 8.06 K/UL (ref 3.9–12.7)

## 2023-12-06 PROCEDURE — 99396 PREV VISIT EST AGE 40-64: CPT | Mod: S$PBB,GZ,GC,

## 2023-12-06 PROCEDURE — 99999 PR PBB SHADOW E&M-EST. PATIENT-LVL III: CPT | Mod: PBBFAC,GC,,

## 2023-12-06 PROCEDURE — 99999 PR PBB SHADOW E&M-EST. PATIENT-LVL III: ICD-10-PCS | Mod: PBBFAC,GC,,

## 2023-12-06 PROCEDURE — 80053 COMPREHEN METABOLIC PANEL: CPT

## 2023-12-06 PROCEDURE — 99213 OFFICE O/P EST LOW 20 MIN: CPT | Mod: PBBFAC

## 2023-12-06 PROCEDURE — 99396 PR PREVENTIVE VISIT,EST,40-64: ICD-10-PCS | Mod: S$PBB,GZ,GC,

## 2023-12-06 PROCEDURE — 85025 COMPLETE CBC W/AUTO DIFF WBC: CPT

## 2023-12-06 PROCEDURE — 36415 COLL VENOUS BLD VENIPUNCTURE: CPT

## 2023-12-06 NOTE — PROGRESS NOTES
Name: Van Jones  : 1965  Date of Service: 2023     Reason for visit:   Chief Complaint   Patient presents with    [facility] Care       HPI: Van Jones is a 58 y.o. year old male with PMH of Sjogren's on MTX, HLD, lumbar radiculopathy and torn L shoulder labrum who presents to [facility] and for preoperative clearance. He has been feeling well beside his shoulder and chronic hip pain and denies any concerning symptoms for surgery including chest pain, shortness of breath, dizziness, lightheadedness, palpations, leg swelling, or vision changes. He had a similar surgery on his right shoulder and tolerated the procedure well. He has undergone anesthesia several times without complications. His cardiac risk assessment is as below:    Surgical Risk Assessment   Active cardiac issues:  Active decompensated heart failure? No   Unstable angina?  No   Significant uncontrolled arrhythmias? No   Severe valvular heart disease-Aortic or Mitral Stenosis? No   Recent MI or coronary revascularization < 30 days? No     Cardiac Risk Factors  History of CAD/ischemic heart disease? No   History of cerebrovascular disease?   No   History of congestive heart failure? No   Type 2 diabetes requiring insulin? No   Serum Creatinine > 2? No   Total cardiac risk factors 0   Add 1 point for high risk surgery (intraperitoneal, intrathoracic, or suprainguinal vascular)  Class I Risk (0 points): 3.9%  Class II Risk (1 point): 6.0%  Class III Risk (2 points): 10.1%  Class IV Risk (3+ points): 15.0%    Functional mets >4    < 4* METs -unable to walk > 2 blocks on level ground without stopping due to symptoms  - eating, dressing, toileting, walking indoors, light housework. POOR   > 4* METs -climbing > 1 flight of stairs without stopping  -walking up hill > 1-2 blocks  -scrubbing floors  -moving furniture  - golf, bowling, dancing or tennis  -running short distance MODERATE to EXCELLENT   * performance of any one of  the activities     Other Issues:       Anticoagulation: Aspirin      Coronary Stenting: NA    Recommendation  his estimated risk with the proposed surgery/procedure for an adverse perioperative cardiac outcome (MI, pulmonary edema, ventricular fibrillation or primary  cardiac arrest, and complete heart block) is 0.4% (0.05%-1.5%) and for an adverse 30 day cardiac outcome (myocardial infarction, cardiac arrest, or death) is 3.9% (95% CI 2.8%-5.4%) (Revised Cardiac Risk Index [RCRI] Score = 0  based on the following risk factors: None). (Perioperative outcomes - Fred at al Circ 1999; 100: 9165-0293; 30 day outcomes - Tu et al. Can J Cardiol 2017; 33:17-32)    1. Patient has the above ling-operative risk at this time given the information currently available.   2. Reverse Warfarin with Vit K and FFP if necessary.  3. Resume full anticoagulation (Warfarin with Heparin/LMWH bridging or DOAC) on POD #2 morning or per surgeon recommendations.         PMH:   Past Medical History:   Diagnosis Date    Broken femur     Deep vein thrombosis     Hyperlipidemia     Immune disorder     Joint pain     Nerve damage left    Pulmonary embolism     Reflex sympathetic dystrophy 1983    Left leg after broken femur surgery       Surgical History:   Past Surgical History:   Procedure Laterality Date    ARTHROSCOPY OF SHOULDER WITH DECOMPRESSION OF SUBACROMIAL SPACE Right 6/21/2019    Procedure: ARTHROSCOPY, SHOULDER, WITH SUBACROMIAL SPACE DECOMPRESSION;  Surgeon: TALON Brink MD;  Location: [facility] OR [address] [place] [address];  Service: Orthopedics;  Laterality: Right;    CERVICAL DISC ARTHROPLASTY  01/23/2018    C3/4    COLONOSCOPY N/A 2/25/2016    Procedure: COLONOSCOPY;  Surgeon: Daryl Viramontes MD;  Location: [facility] (4TH FLR);  Service: Endoscopy;  Laterality: N/A;  PM Prep      FEMUR SURGERY      HERNIA REPAIR      umbilical    SHOULDER ARTHROSCOPY Right 6/21/2019    Procedure: ARTHROSCOPY, SHOULDER, BICEPS TENODESIS;  Surgeon: TALON Cruz  MD Keena;  Location: [facility] OR [address] [place] [address];  Service: Orthopedics;  Laterality: Right;  REGIONAL W/CATHETER INTERSCALENE  [employer] notified 6-19 LO  [facility]der[facility]/arthrex notified 6-20 LO    SYNOVECTOMY OF SHOULDER Right 6/21/2019    Procedure: SYNOVECTOMY, SHOULDER;  Surgeon: TALON Brink MD;  Location: [facility] OR [address] [place] [address];  Service: Orthopedics;  Laterality: Right;    TONSILLECTOMY         Allergies:   Review of patient's allergies indicates:   Allergen Reactions    Escitalopram oxalate     Cymbalta [duloxetine] Rash     Red rashes on arms and chest    Lamisil [terbinafine hcl] Rash       Medications:     Current Outpatient Medications:     aspirin (ECOTRIN) 81 MG EC tablet, Take 81 mg by mouth once daily., Disp: , Rfl:     folic acid (FOLVITE) 1 MG tablet, TAKE 1 TABLET(1 MG) BY MOUTH EVERY DAY, Disp: 30 tablet, Rfl: 1    meloxicam (MOBIC) 15 MG tablet, Take 1 tablet (15 mg total) by mouth daily as needed for Pain., Disp: 30 tablet, Rfl: 1    methotrexate 2.5 MG Tab, TAKE 5 TABLETS BY MOUTH WEEKLY, Disp: 75 tablet, Rfl: 1    multivitamin with minerals tablet, Take 1 tablet by mouth once daily., Disp: , Rfl:     rosuvastatin (CRESTOR) 20 MG tablet, Take 1 tablet (20 mg total) by mouth once daily., Disp: 90 tablet, Rfl: 3    sertraline (ZOLOFT) 100 MG tablet, Take 1 tablet (100 mg total) by mouth once daily., Disp: 90 tablet, Rfl: 1    Family History:   Family History   Problem Relation Age of Onset    Heart disease Mother 60    Neuropathy Mother     Heart disease Father 45    Melanoma Neg Hx        Social History:   Social History     Socioeconomic History    Marital status:    Tobacco Use    Smoking status: Never    Smokeless tobacco: Never   Substance and Sexual Activity    Alcohol use: Yes     Comment: occasional    Drug use: No     Social Determinants of Health     Financial Resource Strain: Low Risk  (11/26/2023)    Overall Financial Resource Strain (CARDIA)     Difficulty of Paying Living Expenses: Not hard at  all   Food Insecurity: No Food Insecurity (11/26/2023)    Hunger Vital Sign     Worried About Running Out of Food in the Last Year: Never true     Ran Out of Food in the Last Year: Never true   Transportation Needs: No Transportation Needs (11/26/2023)    PRAPARE - Transportation     Lack of Transportation (Medical): No     Lack of Transportation (Non-Medical): No   Physical Activity: Insufficiently Active (11/26/2023)    Exercise Vital Sign     Days of Exercise per Week: 1 day     Minutes of Exercise per Session: 20 min   Stress: No Stress Concern Present (11/26/2023)    [employer] - Occupational Stress Questionnaire     Feeling of Stress : Not at all   Social Connections: Unknown (11/26/2023)    Social Connection and Isolation Panel [NHANES]     Frequency of Communication with Friends and Family: More than three times a week     Frequency of Social Gatherings with Friends and Family: More than three times a week     Active Member of Clubs or Organizations: No     Attends Club or Organization Meetings: Never     Marital Status:    Housing Stability: Low Risk  (11/26/2023)    Housing Stability Vital Sign     Unable to Pay for Housing in the Last Year: No     Number of Places Lived in the Last Year: 2     Unstable Housing in the Last Year: No       Review of Systems:   Review of Systems   Constitutional:  Negative for chills, fever and weight loss.   HENT:  Negative for hearing loss.    Eyes:  Negative for blurred vision and double vision.   Respiratory:  Negative for cough and shortness of breath.    Cardiovascular:  Negative for chest pain, palpitations and leg swelling.   Gastrointestinal:  Negative for abdominal pain, constipation, diarrhea, nausea and vomiting.   Genitourinary:  Negative for dysuria, frequency and urgency.   Skin:  Negative for rash.   Neurological:  Negative for dizziness, weakness and headaches.       Vitals:   Vitals:    12/06/23 1347   BP: 124/84  "  Pulse: 60   SpO2: 98%   Weight: 94.8 kg (208 lb 15.9 oz)   Height: 5' 7" (1.702 m)     Body mass index is 32.73 kg/m².    Physical Exam:   Physical Exam  Constitutional:       General: He is not in acute distress.     Appearance: Normal appearance. He is not ill-appearing.   HENT:      Head: Normocephalic and atraumatic.      Nose: Rhinorrhea present.      Mouth/Throat:      Mouth: Mucous membranes are moist.   Eyes:      Extraocular Movements: Extraocular movements intact.      Conjunctiva/sclera: Conjunctivae normal.      Pupils: Pupils are equal, round, and reactive to light.   Cardiovascular:      Rate and Rhythm: Normal rate and regular rhythm.      Pulses: Normal pulses.      Heart sounds: No murmur heard.  Pulmonary:      Effort: Pulmonary effort is normal. No respiratory distress.      Breath sounds: Normal breath sounds. No wheezing, rhonchi or rales.   Abdominal:      General: Abdomen is flat. Bowel sounds are normal. There is no distension.      Palpations: Abdomen is soft.      Tenderness: There is no abdominal tenderness.   Musculoskeletal:         General: Signs of injury present. Normal range of motion.      Right lower leg: No edema.      Left lower leg: No edema.      Comments: Limited ROM in left shoulder   Skin:     General: Skin is warm and dry.      Capillary Refill: Capillary refill takes less than 2 seconds.   Neurological:      Mental Status: He is alert and oriented to person, place, and time.   Psychiatric:         Mood and Affect: Mood normal.         Behavior: Behavior normal.         Thought Content: Thought content normal.         Judgment: Judgment normal.         Labs: Previous labs reviewed.    Imaging: Previous imaging reviewed.    Assessment/Plan:   Patient does not need an EKG with one within the last 3 months and no cardiac history. Will get routine labs. Cardiac risk assessment as above. Cleared for surgery from a primary care standpoint.    Encounter to establish care with " new doctor  -     CBC W/ AUTO DIFFERENTIAL; Future; Expected date: 12/06/2023  -     COMPREHENSIVE METABOLIC PANEL; Future; Expected date: 12/06/2023    Preop general physical exam  -     CBC W/ AUTO DIFFERENTIAL; Future; Expected date: 12/06/2023  -     COMPREHENSIVE METABOLIC PANEL; Future; Expected date: 12/06/2023    Chronic shoulder pain, unspecified laterality      Disposition: Follow-up in 6 months    Discussed with Dr. Jiménez

## 2023-12-07 ENCOUNTER — PATIENT MESSAGE (OUTPATIENT)
Dept: SPORTS MEDICINE | Facility: CLINIC | Age: 58
End: 2023-12-07
Payer: MEDICARE

## 2023-12-11 ENCOUNTER — OFFICE VISIT (OUTPATIENT)
Dept: SPORTS MEDICINE | Facility: CLINIC | Age: 58
End: 2023-12-11
Payer: MEDICARE

## 2023-12-11 VITALS
SYSTOLIC BLOOD PRESSURE: 136 MMHG | BODY MASS INDEX: 31.77 KG/M2 | DIASTOLIC BLOOD PRESSURE: 93 MMHG | HEART RATE: 84 BPM | WEIGHT: 202.81 LBS

## 2023-12-11 DIAGNOSIS — M75.102 NONTRAUMATIC TEAR OF LEFT ROTATOR CUFF, UNSPECIFIED TEAR EXTENT: ICD-10-CM

## 2023-12-11 DIAGNOSIS — S43.432A SLAP LESION OF LEFT SHOULDER: ICD-10-CM

## 2023-12-11 DIAGNOSIS — M75.22 BICEPS TENDINITIS OF LEFT UPPER EXTREMITY: ICD-10-CM

## 2023-12-11 DIAGNOSIS — S43.432A SUPERIOR GLENOID LABRUM LESION OF LEFT SHOULDER, INITIAL ENCOUNTER: Primary | ICD-10-CM

## 2023-12-11 PROCEDURE — 99213 OFFICE O/P EST LOW 20 MIN: CPT | Mod: PBBFAC | Performed by: PHYSICIAN ASSISTANT

## 2023-12-11 PROCEDURE — 99499 UNLISTED E&M SERVICE: CPT | Mod: S$PBB,,, | Performed by: PHYSICIAN ASSISTANT

## 2023-12-11 PROCEDURE — 99499 NO LOS: ICD-10-PCS | Mod: S$PBB,,, | Performed by: PHYSICIAN ASSISTANT

## 2023-12-11 PROCEDURE — 99999 PR PBB SHADOW E&M-EST. PATIENT-LVL III: CPT | Mod: PBBFAC,,, | Performed by: PHYSICIAN ASSISTANT

## 2023-12-11 PROCEDURE — 99999 PR PBB SHADOW E&M-EST. PATIENT-LVL III: ICD-10-PCS | Mod: PBBFAC,,, | Performed by: PHYSICIAN ASSISTANT

## 2023-12-11 RX ORDER — CEFAZOLIN SODIUM 2 G/50ML
2 SOLUTION INTRAVENOUS
Status: CANCELLED | OUTPATIENT
Start: 2023-12-11

## 2023-12-11 RX ORDER — ONDANSETRON 4 MG/1
4 TABLET, ORALLY DISINTEGRATING ORAL EVERY 8 HOURS PRN
Qty: 30 TABLET | Refills: 0 | Status: SHIPPED | OUTPATIENT
Start: 2023-12-11

## 2023-12-11 RX ORDER — ASPIRIN 81 MG/1
81 TABLET ORAL 2 TIMES DAILY
Qty: 28 TABLET | Refills: 0 | Status: SHIPPED | OUTPATIENT
Start: 2023-12-11 | End: 2023-12-29

## 2023-12-11 RX ORDER — SODIUM CHLORIDE 9 MG/ML
INJECTION, SOLUTION INTRAVENOUS CONTINUOUS
Status: CANCELLED | OUTPATIENT
Start: 2023-12-11

## 2023-12-11 RX ORDER — OXYCODONE HYDROCHLORIDE 5 MG/1
5-10 TABLET ORAL
Qty: 28 TABLET | Refills: 0 | Status: SHIPPED | OUTPATIENT
Start: 2023-12-11

## 2023-12-11 NOTE — H&P
Van Jones  is here for a completion of his perioperative paperwork. he  Is scheduled to undergo Left shoulder arthroscopic extensive debridement vs rotator cuff repair, open subpectoral biceps tenodesis, possible subacromial decompression on 12/15/2023.  He is a healthy individual and does need clearance for this procedure.     Patient cleared for surgery per cardiology.    Risks, indications and benefits of the surgical procedure were discussed with the patient. All questions with regard to surgery, rehab, expected return to functional activities, activities of daily living and recreational endeavors were answered to his satisfaction.    Discussed COVID-19 with the patient, they are aware of our current policies and procedures, were given the option of delaying surgery, and they elect to proceed.    Patient was informed and understands the risks of surgery are greater for patients with a current condition or hx of heart disease, obesity, clotting disorders, recurrent infections, steroid use, current or past smoking, and factors such as sedentary lifestyle and noncompliance with medications, therapy or f/u. The degree of the increased risk is hard to estimate w/ any degree of precision.    Once no other questions were asked, a brief history and physical exam was then performed.    PAST MEDICAL HISTORY:   Past Medical History:   Diagnosis Date    Broken femur     Deep vein thrombosis     Hyperlipidemia     Immune disorder     Joint pain     Nerve damage left    Pulmonary embolism     Reflex sympathetic dystrophy 1983    Left leg after broken femur surgery     PAST SURGICAL HISTORY:   Past Surgical History:   Procedure Laterality Date    ARTHROSCOPY OF SHOULDER WITH DECOMPRESSION OF SUBACROMIAL SPACE Right 6/21/2019    Procedure: ARTHROSCOPY, SHOULDER, WITH SUBACROMIAL SPACE DECOMPRESSION;  Surgeon: TALON Brink MD;  Location: [facility] OR [address] [place] [address];  Service: Orthopedics;  Laterality: Right;    CERVICAL DISC  ARTHROPLASTY  01/23/2018    C3/4    COLONOSCOPY N/A 2/25/2016    Procedure: COLONOSCOPY;  Surgeon: Daryl Viramontes MD;  Location: [facility] ENDO (4TH FLR);  Service: Endoscopy;  Laterality: N/A;  PM Prep      FEMUR SURGERY      HERNIA REPAIR      umbilical    SHOULDER ARTHROSCOPY Right 6/21/2019    Procedure: ARTHROSCOPY, SHOULDER, BICEPS TENODESIS;  Surgeon: TALON Brink MD;  Location: [facility] OR 1ST FLR;  Service: Orthopedics;  Laterality: Right;  REGIONAL W/CATHETER INTERSCALENE  [employer] notified 6-19 LO  Broderick/arthrex notified 6-20 LO    SYNOVECTOMY OF SHOULDER Right 6/21/2019    Procedure: SYNOVECTOMY, SHOULDER;  Surgeon: TALON Brink MD;  Location: [facility] OR 1ST FLR;  Service: Orthopedics;  Laterality: Right;    TONSILLECTOMY       FAMILY HISTORY:   Family History   Problem Relation Age of Onset    Heart disease Mother 60    Neuropathy Mother     Heart disease Father 45    Melanoma Neg Hx      SOCIAL HISTORY:   Social History     Socioeconomic History    Marital status:    Tobacco Use    Smoking status: Never    Smokeless tobacco: Never   Substance and Sexual Activity    Alcohol use: Yes     Comment: occasional    Drug use: No     Social Determinants of Health     Financial Resource Strain: Low Risk  (11/26/2023)    Overall Financial Resource Strain (CARDIA)     Difficulty of Paying Living Expenses: Not hard at all   Food Insecurity: No Food Insecurity (11/26/2023)    Hunger Vital Sign     Worried About Running Out of Food in the Last Year: Never true     Ran Out of Food in the Last Year: Never true   Transportation Needs: No Transportation Needs (11/26/2023)    PRAPARE - Transportation     Lack of Transportation (Medical): No     Lack of Transportation (Non-Medical): No   Physical Activity: Insufficiently Active (11/26/2023)    Exercise Vital Sign     Days of Exercise per Week: 1 day     Minutes of Exercise per Session: 20 min   Stress: No Stress Concern Present (11/26/2023)    [employer] [place] [employer]  Occupational Health - Occupational Stress Questionnaire     Feeling of Stress : Not at all   Social Connections: Unknown (11/26/2023)    Social Connection and Isolation Panel [NHANES]     Frequency of Communication with Friends and Family: More than three times a week     Frequency of Social Gatherings with Friends and Family: More than three times a week     Active Member of Clubs or Organizations: No     Attends Club or Organization Meetings: Never     Marital Status:    Housing Stability: Low Risk  (11/26/2023)    Housing Stability Vital Sign     Unable to Pay for Housing in the Last Year: No     Number of Places Lived in the Last Year: 2     Unstable Housing in the Last Year: No       MEDICATIONS:   Current Outpatient Medications:     aspirin (ECOTRIN) 81 MG EC tablet, Take 81 mg by mouth once daily., Disp: , Rfl:     folic acid (FOLVITE) 1 MG tablet, TAKE 1 TABLET(1 MG) BY MOUTH EVERY DAY, Disp: 30 tablet, Rfl: 1    meloxicam (MOBIC) 15 MG tablet, Take 1 tablet (15 mg total) by mouth daily as needed for Pain., Disp: 30 tablet, Rfl: 1    methotrexate 2.5 MG Tab, TAKE 5 TABLETS BY MOUTH WEEKLY, Disp: 75 tablet, Rfl: 1    multivitamin with minerals tablet, Take 1 tablet by mouth once daily., Disp: , Rfl:     rosuvastatin (CRESTOR) 20 MG tablet, Take 1 tablet (20 mg total) by mouth once daily., Disp: 90 tablet, Rfl: 3    sertraline (ZOLOFT) 100 MG tablet, Take 1 tablet (100 mg total) by mouth once daily., Disp: 90 tablet, Rfl: 1  ALLERGIES:   Review of patient's allergies indicates:   Allergen Reactions    Escitalopram oxalate     Cymbalta [duloxetine] Rash     Red rashes on arms and chest    Lamisil [terbinafine hcl] Rash       Review of Systems   Constitution: Negative. Negative for chills, fever and night sweats.   HENT: Negative for congestion and headaches.    Eyes: Negative for blurred vision, left vision loss and right vision loss.   Cardiovascular: Negative for chest pain and syncope.   Respiratory:  Negative for cough and shortness of breath.    Endocrine: Negative for polydipsia, polyphagia and polyuria.   Hematologic/Lymphatic: Negative for bleeding problem. Does not bruise/bleed easily.   Skin: Negative for dry skin, itching and rash.   Musculoskeletal: Negative for falls and muscle weakness.   Gastrointestinal: Negative for abdominal pain and bowel incontinence.   Genitourinary: Negative for bladder incontinence and nocturia.   Neurological: Negative for disturbances in coordination, loss of balance and seizures.   Psychiatric/Behavioral: Negative for depression. The patient does not have insomnia.    Allergic/Immunologic: Negative for hives and persistent infections.     PHYSICAL EXAM:  GEN: A&Ox3, WD WN NAD  HEENT: WNL  CHEST: CTAB, no W/R/R  HEART: RRR, no M/R/G   ABD: Soft, NT ND, BS x4 QUADS  MS: Refer to previous note for detailed MS exam  NEURO: CN II-XII intact       The surgical consent was then reviewed with the patient, who agreed with all the contents of the consent form and it was signed.     PHYSICAL THERAPY:  He was also instructed regarding physical therapy and will begin on POD#1-3. He was given a copy of the original prescription to schedule. Another copy of this prescription was also faxed to [facility] [place].    POST OP CARE: Instructions were reviewed including care of the wound and dressing after surgery and when he can shower.     PAIN MANAGEMENT: Van Jones was instructed regarding the Polar ice unit that will be in place after surgery and his postoperative pain medications.     MEDICATION:  Roxicodone 5 mg 1-2 q 4 hours PRN for pain  Zofran 4 mg q 8 hours PRN for nausea and vomiting.  Aspirin 81mg BID x 2 weeks + HOME SCDs for DVT prophylaxis starting on the evening after surgery.      Post op meds to be delivered bedside prior to discharge. Deliver to family if patient is in surgery at 5pm.     Patient was instructed to purchase and take Colace to counter possible GI  side effects of taking opiates.     DVT prophylaxis was discussed with the patient today including risk factors for developing DVTs and history of DVTs. The patient was asked if any specific recommendations were given from the doctor/s that did pre-operative surgical clearance.      If the patient was previously taking 81mg baby aspirin, they were told to not take additional baby aspirin, using the above stated aspirin and to restart the 81mg aspirin daily after completion of the aspirin dose.      Patient was also told to buy over the counter Prilosec medication and take it once daily for GI protection as long as they are taking NSAIDs or Aspirin.     The patient was told that narcotic pain medications may make them drowsy and instructions were given to not sign legal documents, drive or operate heavy machinery, cars, or equipment while under the influence of narcotic medications.     As there were no other questions to be asked, he was given my business card along with Dr. Brink's business card if he has any questions or concerns prior to surgery or in the postop period.

## 2023-12-11 NOTE — H&P (VIEW-ONLY)
Van Jones  is here for a completion of his perioperative paperwork. he  Is scheduled to undergo Left shoulder arthroscopic extensive debridement vs rotator cuff repair, open subpectoral biceps tenodesis, possible subacromial decompression on 12/15/2023.  He is a healthy individual and does need clearance for this procedure.     Patient cleared for surgery per cardiology.    Risks, indications and benefits of the surgical procedure were discussed with the patient. All questions with regard to surgery, rehab, expected return to functional activities, activities of daily living and recreational endeavors were answered to his satisfaction.    Discussed COVID-19 with the patient, they are aware of our current policies and procedures, were given the option of delaying surgery, and they elect to proceed.    Patient was informed and understands the risks of surgery are greater for patients with a current condition or hx of heart disease, obesity, clotting disorders, recurrent infections, steroid use, current or past smoking, and factors such as sedentary lifestyle and noncompliance with medications, therapy or f/u. The degree of the increased risk is hard to estimate w/ any degree of precision.    Once no other questions were asked, a brief history and physical exam was then performed.    PAST MEDICAL HISTORY:   Past Medical History:   Diagnosis Date    Broken femur     Deep vein thrombosis     Hyperlipidemia     Immune disorder     Joint pain     Nerve damage left    Pulmonary embolism     Reflex sympathetic dystrophy 1983    Left leg after broken femur surgery     PAST SURGICAL HISTORY:   Past Surgical History:   Procedure Laterality Date    ARTHROSCOPY OF SHOULDER WITH DECOMPRESSION OF SUBACROMIAL SPACE Right 6/21/2019    Procedure: ARTHROSCOPY, SHOULDER, WITH SUBACROMIAL SPACE DECOMPRESSION;  Surgeon: TALON Brink MD;  Location: [facility] OR [address] [place] [address];  Service: Orthopedics;  Laterality: Right;    CERVICAL DISC  ARTHROPLASTY  01/23/2018    C3/4    COLONOSCOPY N/A 2/25/2016    Procedure: COLONOSCOPY;  Surgeon: Daryl Viramontes MD;  Location: [facility] ENDO (4TH FLR);  Service: Endoscopy;  Laterality: N/A;  PM Prep      FEMUR SURGERY      HERNIA REPAIR      umbilical    SHOULDER ARTHROSCOPY Right 6/21/2019    Procedure: ARTHROSCOPY, SHOULDER, BICEPS TENODESIS;  Surgeon: TALON Brink MD;  Location: [facility] OR 1ST FLR;  Service: Orthopedics;  Laterality: Right;  REGIONAL W/CATHETER INTERSCALENE  [employer] notified 6-19 LO  Broderick/arthrex notified 6-20 LO    SYNOVECTOMY OF SHOULDER Right 6/21/2019    Procedure: SYNOVECTOMY, SHOULDER;  Surgeon: TALON Brink MD;  Location: [facility] OR 1ST FLR;  Service: Orthopedics;  Laterality: Right;    TONSILLECTOMY       FAMILY HISTORY:   Family History   Problem Relation Age of Onset    Heart disease Mother 60    Neuropathy Mother     Heart disease Father 45    Melanoma Neg Hx      SOCIAL HISTORY:   Social History     Socioeconomic History    Marital status:    Tobacco Use    Smoking status: Never    Smokeless tobacco: Never   Substance and Sexual Activity    Alcohol use: Yes     Comment: occasional    Drug use: No     Social Determinants of Health     Financial Resource Strain: Low Risk  (11/26/2023)    Overall Financial Resource Strain (CARDIA)     Difficulty of Paying Living Expenses: Not hard at all   Food Insecurity: No Food Insecurity (11/26/2023)    Hunger Vital Sign     Worried About Running Out of Food in the Last Year: Never true     Ran Out of Food in the Last Year: Never true   Transportation Needs: No Transportation Needs (11/26/2023)    PRAPARE - Transportation     Lack of Transportation (Medical): No     Lack of Transportation (Non-Medical): No   Physical Activity: Insufficiently Active (11/26/2023)    Exercise Vital Sign     Days of Exercise per Week: 1 day     Minutes of Exercise per Session: 20 min   Stress: No Stress Concern Present (11/26/2023)    [employer] [place] [employer]  Occupational Health - Occupational Stress Questionnaire     Feeling of Stress : Not at all   Social Connections: Unknown (11/26/2023)    Social Connection and Isolation Panel [NHANES]     Frequency of Communication with Friends and Family: More than three times a week     Frequency of Social Gatherings with Friends and Family: More than three times a week     Active Member of Clubs or Organizations: No     Attends Club or Organization Meetings: Never     Marital Status:    Housing Stability: Low Risk  (11/26/2023)    Housing Stability Vital Sign     Unable to Pay for Housing in the Last Year: No     Number of Places Lived in the Last Year: 2     Unstable Housing in the Last Year: No       MEDICATIONS:   Current Outpatient Medications:     aspirin (ECOTRIN) 81 MG EC tablet, Take 81 mg by mouth once daily., Disp: , Rfl:     folic acid (FOLVITE) 1 MG tablet, TAKE 1 TABLET(1 MG) BY MOUTH EVERY DAY, Disp: 30 tablet, Rfl: 1    meloxicam (MOBIC) 15 MG tablet, Take 1 tablet (15 mg total) by mouth daily as needed for Pain., Disp: 30 tablet, Rfl: 1    methotrexate 2.5 MG Tab, TAKE 5 TABLETS BY MOUTH WEEKLY, Disp: 75 tablet, Rfl: 1    multivitamin with minerals tablet, Take 1 tablet by mouth once daily., Disp: , Rfl:     rosuvastatin (CRESTOR) 20 MG tablet, Take 1 tablet (20 mg total) by mouth once daily., Disp: 90 tablet, Rfl: 3    sertraline (ZOLOFT) 100 MG tablet, Take 1 tablet (100 mg total) by mouth once daily., Disp: 90 tablet, Rfl: 1  ALLERGIES:   Review of patient's allergies indicates:   Allergen Reactions    Escitalopram oxalate     Cymbalta [duloxetine] Rash     Red rashes on arms and chest    Lamisil [terbinafine hcl] Rash       Review of Systems   Constitution: Negative. Negative for chills, fever and night sweats.   HENT: Negative for congestion and headaches.    Eyes: Negative for blurred vision, left vision loss and right vision loss.   Cardiovascular: Negative for chest pain and syncope.   Respiratory:  Negative for cough and shortness of breath.    Endocrine: Negative for polydipsia, polyphagia and polyuria.   Hematologic/Lymphatic: Negative for bleeding problem. Does not bruise/bleed easily.   Skin: Negative for dry skin, itching and rash.   Musculoskeletal: Negative for falls and muscle weakness.   Gastrointestinal: Negative for abdominal pain and bowel incontinence.   Genitourinary: Negative for bladder incontinence and nocturia.   Neurological: Negative for disturbances in coordination, loss of balance and seizures.   Psychiatric/Behavioral: Negative for depression. The patient does not have insomnia.    Allergic/Immunologic: Negative for hives and persistent infections.     PHYSICAL EXAM:  GEN: A&Ox3, WD WN NAD  HEENT: WNL  CHEST: CTAB, no W/R/R  HEART: RRR, no M/R/G   ABD: Soft, NT ND, BS x4 QUADS  MS: Refer to previous note for detailed MS exam  NEURO: CN II-XII intact       The surgical consent was then reviewed with the patient, who agreed with all the contents of the consent form and it was signed.     PHYSICAL THERAPY:  He was also instructed regarding physical therapy and will begin on POD#1-3. He was given a copy of the original prescription to schedule. Another copy of this prescription was also faxed to [facility] in [place].    POST OP CARE: Instructions were reviewed including care of the wound and dressing after surgery and when he can shower.     PAIN MANAGEMENT: Van Jones was instructed regarding the Polar ice unit that will be in place after surgery and his postoperative pain medications.     MEDICATION:  Roxicodone 5 mg 1-2 q 4 hours PRN for pain  Zofran 4 mg q 8 hours PRN for nausea and vomiting.  Aspirin 81mg BID x 2 weeks + HOME SCDs for DVT prophylaxis starting on the evening after surgery.      Post op meds to be delivered bedside prior to discharge. Deliver to family if patient is in surgery at 5pm.     Patient was instructed to purchase and take Colace to counter possible GI  side effects of taking opiates.     DVT prophylaxis was discussed with the patient today including risk factors for developing DVTs and history of DVTs. The patient was asked if any specific recommendations were given from the doctor/s that did pre-operative surgical clearance.      If the patient was previously taking 81mg baby aspirin, they were told to not take additional baby aspirin, using the above stated aspirin and to restart the 81mg aspirin daily after completion of the aspirin dose.      Patient was also told to buy over the counter Prilosec medication and take it once daily for GI protection as long as they are taking NSAIDs or Aspirin.     The patient was told that narcotic pain medications may make them drowsy and instructions were given to not sign legal documents, drive or operate heavy machinery, cars, or equipment while under the influence of narcotic medications.     As there were no other questions to be asked, he was given my business card along with Dr. Brink's business card if he has any questions or concerns prior to surgery or in the postop period.

## 2023-12-13 ENCOUNTER — PATIENT MESSAGE (OUTPATIENT)
Dept: PREADMISSION TESTING | Facility: HOSPITAL | Age: 58
End: 2023-12-13
Payer: MEDICARE
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
## 2023-12-14 ENCOUNTER — ANESTHESIA EVENT (OUTPATIENT)
Dept: SURGERY | Facility: HOSPITAL | Age: 58
End: 2023-12-14
Payer: MEDICARE

## 2023-12-14 ENCOUNTER — TELEPHONE (OUTPATIENT)
Dept: SPORTS MEDICINE | Facility: CLINIC | Age: 58
End: 2023-12-14
Payer: MEDICARE

## 2023-12-14 LAB — CRYOGLOB SER QL: NORMAL

## 2023-12-14 NOTE — TELEPHONE ENCOUNTER
----- Message from Flory Ann sent at 12/14/2023 12:26 PM CST -----  Regarding: Appt  Contact: Pt @[phone]  Pt calling to check time for procedure on 12/15  Please call Pt @[phone]

## 2023-12-15 ENCOUNTER — ANESTHESIA (OUTPATIENT)
Dept: SURGERY | Facility: HOSPITAL | Age: 58
End: 2023-12-15
Payer: MEDICARE

## 2023-12-15 ENCOUNTER — HOSPITAL ENCOUNTER (OUTPATIENT)
Facility: HOSPITAL | Age: 58
Discharge: HOME | End: 2023-12-15
Attending: ORTHOPAEDIC SURGERY | Admitting: ORTHOPAEDIC SURGERY
Payer: MEDICARE

## 2023-12-15 DIAGNOSIS — S43.432A SUPERIOR GLENOID LABRUM LESION OF LEFT SHOULDER, INITIAL ENCOUNTER: ICD-10-CM

## 2023-12-15 DIAGNOSIS — M75.102 NONTRAUMATIC TEAR OF LEFT ROTATOR CUFF, UNSPECIFIED TEAR EXTENT: ICD-10-CM

## 2023-12-15 DIAGNOSIS — M75.22 BICEPS TENDINITIS OF LEFT UPPER EXTREMITY: ICD-10-CM

## 2023-12-15 DIAGNOSIS — S43.432A SLAP LESION OF LEFT SHOULDER: ICD-10-CM

## 2023-12-15 PROCEDURE — 25000003 PHARM REV CODE 250: Performed by: NURSE ANESTHETIST, CERTIFIED REGISTERED

## 2023-12-15 PROCEDURE — 29823 PR SHLDR ARTHROSCOP,EXTEN DEBRIDE: ICD-10-PCS | Mod: 82,LT,, | Performed by: STUDENT IN AN ORGANIZED HEALTH CARE EDUCATION/TRAINING PROGRAM

## 2023-12-15 PROCEDURE — 25000003 PHARM REV CODE 250: Performed by: ANESTHESIOLOGY

## 2023-12-15 PROCEDURE — 63600175 PHARM REV CODE 636 W HCPCS: Performed by: ANESTHESIOLOGY

## 2023-12-15 PROCEDURE — 25000003 PHARM REV CODE 250: Performed by: PHYSICIAN ASSISTANT

## 2023-12-15 PROCEDURE — 36000710: Performed by: ORTHOPAEDIC SURGERY

## 2023-12-15 PROCEDURE — 29827 SHO ARTHRS SRG RT8TR CUF RPR: CPT | Mod: LT,ICN,, | Performed by: ORTHOPAEDIC SURGERY

## 2023-12-15 PROCEDURE — D9220A PRA ANESTHESIA: Mod: ANES,,, | Performed by: ANESTHESIOLOGY

## 2023-12-15 PROCEDURE — 99900035 HC TECH TIME PER 15 MIN (STAT)

## 2023-12-15 PROCEDURE — 64415 LEFT INTERSCALENE SINGLE SHOT: ICD-10-PCS | Mod: 59,LT,, | Performed by: ANESTHESIOLOGY

## 2023-12-15 PROCEDURE — 23430 REPAIR BICEPS TENDON: CPT | Mod: 51,LT,ICN, | Performed by: ORTHOPAEDIC SURGERY

## 2023-12-15 PROCEDURE — 29823 PR SHLDR ARTHROSCOP,EXTEN DEBRIDE: ICD-10-PCS | Mod: 51,LT,ICN, | Performed by: ORTHOPAEDIC SURGERY

## 2023-12-15 PROCEDURE — 37000008 HC ANESTHESIA 1ST 15 MINUTES: Performed by: ORTHOPAEDIC SURGERY

## 2023-12-15 PROCEDURE — 94761 N-INVAS EAR/PLS OXIMETRY MLT: CPT

## 2023-12-15 PROCEDURE — C1713 ANCHOR/SCREW BN/BN,TIS/BN: HCPCS | Performed by: ORTHOPAEDIC SURGERY

## 2023-12-15 PROCEDURE — 25000242 PHARM REV CODE 250 ALT 637 W/ HCPCS: Performed by: ANESTHESIOLOGY

## 2023-12-15 PROCEDURE — 23430 PR REPAIR BICEPS LONG TENDON: ICD-10-PCS | Mod: 51,LT,ICN, | Performed by: ORTHOPAEDIC SURGERY

## 2023-12-15 PROCEDURE — 71000039 HC RECOVERY, EACH ADD'L HOUR: Performed by: ORTHOPAEDIC SURGERY

## 2023-12-15 PROCEDURE — 71000033 HC RECOVERY, INTIAL HOUR: Performed by: ORTHOPAEDIC SURGERY

## 2023-12-15 PROCEDURE — 94640 AIRWAY INHALATION TREATMENT: CPT

## 2023-12-15 PROCEDURE — 63600175 PHARM REV CODE 636 W HCPCS: Performed by: ORTHOPAEDIC SURGERY

## 2023-12-15 PROCEDURE — 63600175 PHARM REV CODE 636 W HCPCS: Performed by: PHYSICIAN ASSISTANT

## 2023-12-15 PROCEDURE — 29823 SHO ARTHRS SRG XTNSV DBRDMT: CPT | Mod: 82,LT,, | Performed by: STUDENT IN AN ORGANIZED HEALTH CARE EDUCATION/TRAINING PROGRAM

## 2023-12-15 PROCEDURE — 36000711: Performed by: ORTHOPAEDIC SURGERY

## 2023-12-15 PROCEDURE — 37000009 HC ANESTHESIA EA ADD 15 MINS: Performed by: ORTHOPAEDIC SURGERY

## 2023-12-15 PROCEDURE — D9220A PRA ANESTHESIA: ICD-10-PCS | Mod: ANES,,, | Performed by: ANESTHESIOLOGY

## 2023-12-15 PROCEDURE — 64415 NJX AA&/STRD BRCH PLXS IMG: CPT | Performed by: ANESTHESIOLOGY

## 2023-12-15 PROCEDURE — 27200750 HC INSULATED NEEDLE/ STIMUPLEX: Performed by: ANESTHESIOLOGY

## 2023-12-15 PROCEDURE — 63600175 PHARM REV CODE 636 W HCPCS: Performed by: NURSE ANESTHETIST, CERTIFIED REGISTERED

## 2023-12-15 PROCEDURE — 29827 PR SHLDR ARTHROSCOP,SURG,W/ROTAT CUFF REPR: ICD-10-PCS | Mod: LT,ICN,, | Performed by: ORTHOPAEDIC SURGERY

## 2023-12-15 PROCEDURE — D9220A PRA ANESTHESIA: Mod: CRNA,,, | Performed by: NURSE ANESTHETIST, CERTIFIED REGISTERED

## 2023-12-15 PROCEDURE — 71000015 HC POSTOP RECOV 1ST HR: Performed by: ORTHOPAEDIC SURGERY

## 2023-12-15 PROCEDURE — 29823 SHO ARTHRS SRG XTNSV DBRDMT: CPT | Mod: 51,LT,ICN, | Performed by: ORTHOPAEDIC SURGERY

## 2023-12-15 PROCEDURE — 27201423 OPTIME MED/SURG SUP & DEVICES STERILE SUPPLY: Performed by: ORTHOPAEDIC SURGERY

## 2023-12-15 PROCEDURE — D9220A PRA ANESTHESIA: ICD-10-PCS | Mod: CRNA,,, | Performed by: NURSE ANESTHETIST, CERTIFIED REGISTERED

## 2023-12-15 DEVICE — SYS IMPL PROXIMAL TENODESIS: Type: IMPLANTABLE DEVICE | Site: SHOULDER | Status: FUNCTIONAL

## 2023-12-15 DEVICE — ANCHOR BIOCOMP W/3 SUTURES: Type: IMPLANTABLE DEVICE | Site: SHOULDER | Status: FUNCTIONAL

## 2023-12-15 DEVICE — IMPLANTABLE DEVICE: Type: IMPLANTABLE DEVICE | Site: SHOULDER | Status: FUNCTIONAL

## 2023-12-15 RX ORDER — BUPIVACAINE HYDROCHLORIDE 5 MG/ML
INJECTION, SOLUTION EPIDURAL; INTRACAUDAL
Status: DISCONTINUED | OUTPATIENT
Start: 2023-12-15 | End: 2023-12-15 | Stop reason: HOSPADM

## 2023-12-15 RX ORDER — ONDANSETRON 2 MG/ML
4 INJECTION INTRAMUSCULAR; INTRAVENOUS DAILY PRN
Status: DISCONTINUED | OUTPATIENT
Start: 2023-12-15 | End: 2023-12-15 | Stop reason: HOSPADM

## 2023-12-15 RX ORDER — CELECOXIB 200 MG/1
400 CAPSULE ORAL DAILY
Status: DISCONTINUED | OUTPATIENT
Start: 2023-12-15 | End: 2023-12-15 | Stop reason: HOSPADM

## 2023-12-15 RX ORDER — IPRATROPIUM BROMIDE AND ALBUTEROL SULFATE 2.5; .5 MG/3ML; MG/3ML
3 SOLUTION RESPIRATORY (INHALATION) ONCE
Status: COMPLETED | OUTPATIENT
Start: 2023-12-15 | End: 2023-12-15

## 2023-12-15 RX ORDER — FENTANYL CITRATE 50 UG/ML
100 INJECTION, SOLUTION INTRAMUSCULAR; INTRAVENOUS CONTINUOUS PRN
Status: DISCONTINUED | OUTPATIENT
Start: 2023-12-15 | End: 2023-12-15 | Stop reason: HOSPADM

## 2023-12-15 RX ORDER — ONDANSETRON 2 MG/ML
INJECTION INTRAMUSCULAR; INTRAVENOUS
Status: DISCONTINUED | OUTPATIENT
Start: 2023-12-15 | End: 2023-12-15

## 2023-12-15 RX ORDER — HALOPERIDOL 5 MG/ML
0.5 INJECTION INTRAMUSCULAR EVERY 10 MIN PRN
Status: DISCONTINUED | OUTPATIENT
Start: 2023-12-15 | End: 2023-12-15 | Stop reason: HOSPADM

## 2023-12-15 RX ORDER — DEXAMETHASONE SODIUM PHOSPHATE 4 MG/ML
INJECTION, SOLUTION INTRA-ARTICULAR; INTRALESIONAL; INTRAMUSCULAR; INTRAVENOUS; SOFT TISSUE
Status: DISCONTINUED | OUTPATIENT
Start: 2023-12-15 | End: 2023-12-15

## 2023-12-15 RX ORDER — PHENYLEPHRINE HYDROCHLORIDE 10 MG/ML
INJECTION INTRAVENOUS
Status: DISCONTINUED | OUTPATIENT
Start: 2023-12-15 | End: 2023-12-15

## 2023-12-15 RX ORDER — ROPIVACAINE HYDROCHLORIDE 5 MG/ML
INJECTION, SOLUTION EPIDURAL; INFILTRATION; PERINEURAL
Status: COMPLETED | OUTPATIENT
Start: 2023-12-15 | End: 2023-12-15

## 2023-12-15 RX ORDER — METHOCARBAMOL 500 MG/1
1000 TABLET, FILM COATED ORAL ONCE
Status: COMPLETED | OUTPATIENT
Start: 2023-12-15 | End: 2023-12-15

## 2023-12-15 RX ORDER — ROCURONIUM BROMIDE 10 MG/ML
INJECTION, SOLUTION INTRAVENOUS
Status: DISCONTINUED | OUTPATIENT
Start: 2023-12-15 | End: 2023-12-15

## 2023-12-15 RX ORDER — MIDAZOLAM HYDROCHLORIDE 1 MG/ML
1 INJECTION INTRAMUSCULAR; INTRAVENOUS
Status: DISCONTINUED | OUTPATIENT
Start: 2023-12-15 | End: 2023-12-15 | Stop reason: HOSPADM

## 2023-12-15 RX ORDER — PROPOFOL 10 MG/ML
VIAL (ML) INTRAVENOUS
Status: DISCONTINUED | OUTPATIENT
Start: 2023-12-15 | End: 2023-12-15

## 2023-12-15 RX ORDER — ACETAMINOPHEN 500 MG
1000 TABLET ORAL ONCE
Status: COMPLETED | OUTPATIENT
Start: 2023-12-15 | End: 2023-12-15

## 2023-12-15 RX ORDER — FAMOTIDINE 10 MG/ML
INJECTION INTRAVENOUS
Status: DISCONTINUED | OUTPATIENT
Start: 2023-12-15 | End: 2023-12-15

## 2023-12-15 RX ORDER — LIDOCAINE HYDROCHLORIDE 20 MG/ML
INJECTION INTRAVENOUS
Status: DISCONTINUED | OUTPATIENT
Start: 2023-12-15 | End: 2023-12-15

## 2023-12-15 RX ORDER — SODIUM CHLORIDE 9 MG/ML
INJECTION, SOLUTION INTRAVENOUS CONTINUOUS
Status: DISCONTINUED | OUTPATIENT
Start: 2023-12-15 | End: 2023-12-15 | Stop reason: HOSPADM

## 2023-12-15 RX ORDER — NEOSTIGMINE METHYLSULFATE 0.5 MG/ML
INJECTION, SOLUTION INTRAVENOUS
Status: DISCONTINUED | OUTPATIENT
Start: 2023-12-15 | End: 2023-12-15

## 2023-12-15 RX ORDER — SODIUM CHLORIDE 0.9 % (FLUSH) 0.9 %
10 SYRINGE (ML) INJECTION
Status: DISCONTINUED | OUTPATIENT
Start: 2023-12-15 | End: 2023-12-15 | Stop reason: HOSPADM

## 2023-12-15 RX ORDER — EPINEPHRINE 1 MG/ML
INJECTION, SOLUTION, CONCENTRATE INTRAVENOUS
Status: DISCONTINUED | OUTPATIENT
Start: 2023-12-15 | End: 2023-12-15 | Stop reason: HOSPADM

## 2023-12-15 RX ORDER — EPHEDRINE SULFATE 50 MG/ML
INJECTION, SOLUTION INTRAVENOUS
Status: DISCONTINUED | OUTPATIENT
Start: 2023-12-15 | End: 2023-12-15

## 2023-12-15 RX ORDER — CARBOXYMETHYLCELLULOSE SODIUM 10 MG/ML
GEL OPHTHALMIC
Status: DISCONTINUED | OUTPATIENT
Start: 2023-12-15 | End: 2023-12-15

## 2023-12-15 RX ORDER — CELECOXIB 200 MG/1
400 CAPSULE ORAL DAILY
Status: DISCONTINUED | OUTPATIENT
Start: 2023-12-15 | End: 2023-12-15

## 2023-12-15 RX ADMIN — SODIUM CHLORIDE, SODIUM GLUCONATE, SODIUM ACETATE, POTASSIUM CHLORIDE, MAGNESIUM CHLORIDE, SODIUM PHOSPHATE, DIBASIC, AND POTASSIUM PHOSPHATE: .53; .5; .37; .037; .03; .012; .00082 INJECTION, SOLUTION INTRAVENOUS at 07:12

## 2023-12-15 RX ADMIN — ACETAMINOPHEN 1000 MG: 500 TABLET ORAL at 06:12

## 2023-12-15 RX ADMIN — PHENYLEPHRINE HYDROCHLORIDE 200 MCG: 10 INJECTION INTRAVENOUS at 07:12

## 2023-12-15 RX ADMIN — ONDANSETRON 4 MG: 2 INJECTION INTRAMUSCULAR; INTRAVENOUS at 08:12

## 2023-12-15 RX ADMIN — GLYCOPYRROLATE 0.6 MG: 0.2 INJECTION, SOLUTION INTRAMUSCULAR; INTRAVENOUS at 08:12

## 2023-12-15 RX ADMIN — EPHEDRINE SULFATE 5 MG: 50 INJECTION INTRAVENOUS at 07:12

## 2023-12-15 RX ADMIN — NEOSTIGMINE METHYLSULFATE 5 MG: 0.5 INJECTION INTRAVENOUS at 08:12

## 2023-12-15 RX ADMIN — SODIUM CHLORIDE: 9 INJECTION, SOLUTION INTRAVENOUS at 06:12

## 2023-12-15 RX ADMIN — SODIUM CHLORIDE: 0.9 INJECTION, SOLUTION INTRAVENOUS at 05:12

## 2023-12-15 RX ADMIN — IPRATROPIUM BROMIDE AND ALBUTEROL SULFATE 3 ML: .5; 3 SOLUTION RESPIRATORY (INHALATION) at 10:12

## 2023-12-15 RX ADMIN — CARBOXYMETHYLCELLULOSE SODIUM 4 DROP: 10 GEL OPHTHALMIC at 07:12

## 2023-12-15 RX ADMIN — CEFAZOLIN 2 G: 2 INJECTION, POWDER, FOR SOLUTION INTRAMUSCULAR; INTRAVENOUS at 07:12

## 2023-12-15 RX ADMIN — PROPOFOL 200 MG: 10 INJECTION, EMULSION INTRAVENOUS at 07:12

## 2023-12-15 RX ADMIN — PHENYLEPHRINE HYDROCHLORIDE 100 MCG: 10 INJECTION INTRAVENOUS at 08:12

## 2023-12-15 RX ADMIN — ROPIVACAINE HYDROCHLORIDE 15 ML: 5 INJECTION EPIDURAL; INFILTRATION; PERINEURAL at 06:12

## 2023-12-15 RX ADMIN — ROCURONIUM BROMIDE 50 MG: 10 INJECTION INTRAVENOUS at 07:12

## 2023-12-15 RX ADMIN — FENTANYL CITRATE 100 MCG: 50 INJECTION INTRAMUSCULAR; INTRAVENOUS at 06:12

## 2023-12-15 RX ADMIN — PHENYLEPHRINE HYDROCHLORIDE 150 MCG: 10 INJECTION INTRAVENOUS at 07:12

## 2023-12-15 RX ADMIN — FAMOTIDINE 20 MG: 10 INJECTION, SOLUTION INTRAVENOUS at 07:12

## 2023-12-15 RX ADMIN — CELECOXIB 400 MG: 200 CAPSULE ORAL at 06:12

## 2023-12-15 RX ADMIN — METHOCARBAMOL 1000 MG: 500 TABLET ORAL at 09:12

## 2023-12-15 RX ADMIN — MIDAZOLAM 2 MG: 1 INJECTION INTRAMUSCULAR; INTRAVENOUS at 06:12

## 2023-12-15 RX ADMIN — DEXAMETHASONE SODIUM PHOSPHATE 8 MG: 4 INJECTION, SOLUTION INTRAMUSCULAR; INTRAVENOUS at 07:12

## 2023-12-15 RX ADMIN — LIDOCAINE HYDROCHLORIDE 80 MG: 20 INJECTION INTRAVENOUS at 07:12

## 2023-12-15 RX ADMIN — PROPOFOL 50 MG: 10 INJECTION, EMULSION INTRAVENOUS at 07:12

## 2023-12-15 RX ADMIN — FENTANYL CITRATE 50 MCG: 50 INJECTION INTRAMUSCULAR; INTRAVENOUS at 07:12

## 2023-12-15 RX ADMIN — PHENYLEPHRINE HYDROCHLORIDE 100 MCG: 10 INJECTION INTRAVENOUS at 07:12

## 2023-12-15 RX ADMIN — EPHEDRINE SULFATE 5 MG: 50 INJECTION INTRAVENOUS at 08:12

## 2023-12-15 NOTE — ANESTHESIA POSTPROCEDURE EVALUATION
Anesthesia Post Evaluation    Patient: Van Jones    Procedure(s) Performed: Procedure(s) (LRB):  OPEN BICEPS TENODESIS, FIXATION, TENDON (Left)  DEBRIDEMENT, SHOULDER, ARTHROSCOPIC (Left)  REPAIR, ROTATOR CUFF, ARTHROSCOPIC (Left)  ARTHROSCOPY, SHOULDER, WITH SUBACROMIAL DECOMPRESSION (Left)    Final Anesthesia Type: general      Patient location during evaluation: PACU  Patient participation: Yes- Able to Participate  Level of consciousness: awake and alert and oriented  Post-procedure vital signs: reviewed and stable  Pain management: adequate  Airway patency: patent    PONV status at discharge: No PONV  Anesthetic complications: no      Cardiovascular status: hemodynamically stable  Respiratory status: nasal cannula  Hydration status: euvolemic  Follow-up not needed.              Vitals Value Taken Time   /77 12/15/23 1035   Temp 36.5 °C (97.7 °F) 12/15/23 1035   Pulse 79 12/15/23 1035   Resp 14 12/15/23 1035   SpO2 96 % 12/15/23 1035         Event Time   Out of Recovery 10:26:00         Pain/Amrit Score: Pain Rating Prior to Med Admin: 6 (12/15/2023  6:41 AM)  Amrit Score: 9 (12/15/2023 10:18 AM)

## 2023-12-15 NOTE — PROGRESS NOTES
Pt sats mi 80s on room air. RT to bedside, Anesthesia notified. IS performed, neb treatment ordered and given. Pt sats now 96% on RA.

## 2023-12-15 NOTE — ANESTHESIA PROCEDURE NOTES
Left interscalene single shot    Patient location during procedure: pre-op   Block not for primary anesthetic.  Reason for block: at surgeon's request and post-op pain management   Post-op Pain Location: Left shoulder pain   Start time: 12/15/2023 6:31 AM  Timeout: 12/15/2023 6:30 AM   End time: 12/15/2023 6:40 AM    Staffing  Authorizing Provider: Dina Chadwick MD  Performing Provider: Dina Chadwick MD    Staffing  Performed by: Dina Chadwick MD  Authorized by: Dina Chadwick MD    Preanesthetic Checklist  Completed: patient identified, IV checked, site marked, risks and benefits discussed, surgical consent, monitors and equipment checked, pre-op evaluation and timeout performed  Peripheral Block  Patient position: sitting  Prep: ChloraPrep  Patient monitoring: heart rate, cardiac monitor, continuous pulse ox, continuous capnometry and frequent blood pressure checks  Block type: interscalene  Laterality: left  Injection technique: single shot  Needle  Needle type: Stimuplex   Needle gauge: 22 G  Needle length: 2 in  Needle localization: anatomical landmarks and ultrasound guidance   -ultrasound image captured on disc.  Assessment  Injection assessment: negative aspiration, negative parasthesia and local visualized surrounding nerve  Paresthesia pain: none  Heart rate change: no  Slow fractionated injection: yes  Pain Tolerance: comfortable throughout block and no complaints  Medications:    Medications: ropivacaine (NAROPIN) injection 0.5% - Perineural   15 mL - 12/15/2023 6:35:00 AM    Additional Notes  VSS.  [facility] RN monitoring vitals throughout procedure.  Patient tolerated procedure well.     With 15mL normal saline, 1:300,000 epi, 1mg PF decadron, 50mcg clonidine

## 2023-12-15 NOTE — PLAN OF CARE
Discharge plan reviewed w/ pt. AVSS on RA. L shoulder dressings CDI, sling in place. No c/o pain. All Rx delivered to bedside. Pt states he is ready for discharge.

## 2023-12-15 NOTE — PROGRESS NOTES
Pre op completed.  Patient belongings to be placed in locker. Bed in lowest position. Call light within reach. Family at beside. Bear hugger refused.

## 2023-12-15 NOTE — ANESTHESIA PROCEDURE NOTES
Intubation    Date/Time: 12/15/2023 7:10 AM    Performed by: Jaqui Gandara CRNA  Authorized by: Dina Chadwick MD    Intubation:     Induction:  Intravenous    Intubated:  Postinduction    Mask Ventilation:  Easy with oral airway    Attempts:  1    Attempted By:  CRNA    Method of Intubation:  Video laryngoscopy    Blade:  Thorpe 3    Laryngeal View Grade: Grade I - full view of cords      Difficult Airway Encountered?: No      Complications:  None    Airway Device:  Oral endotracheal tube    Airway Device Size:  7.0    Style/Cuff Inflation:  Cuffed (inflated to minimal occlusive pressure)    Inflation Amount (mL):  6    Tube secured:  22    Secured at:  The lips    Placement Verified By:  Capnometry    Complicating Factors:  Small mouth and poor neck/head extension    Findings Post-Intubation:  BS equal bilateral and atraumatic/condition of teeth unchanged

## 2023-12-15 NOTE — OPERATIVE NOTE ADDENDUM
Certification of Assistant at Surgery       Surgery Date: 12/15/2023     Participating Surgeons:  Surgeon(s) and Role:     * TALON Brink MD - Primary    Procedures:  Procedure(s) (LRB):  OPEN BICEPS TENODESIS, FIXATION, TENDON (Left)  DEBRIDEMENT, SHOULDER, ARTHROSCOPIC (Left)  REPAIR, ROTATOR CUFF, ARTHROSCOPIC (Left)  ARTHROSCOPY, SHOULDER, WITH SUBACROMIAL DECOMPRESSION (Left)    Assistant Surgeon's Certification of Necessity:  I understand that section 1842 (b) (6) (d) of the Social Security Act generally prohibits Medicare Part B reasonable charge payment for the services of assistants at surgery in teaching hospitals when qualified residents are available to furnish such services. I certify that the services for which payment is claimed were medically necessary, and that no qualified resident was available to perform the services. I further understand that these services are subject to post-payment review by the Medicare carrier.      Dillon Zhang DO    12/15/2023  4:12 PM

## 2023-12-15 NOTE — TRANSFER OF CARE
"Anesthesia Transfer of Care Note    Patient: Van Jones    Procedure(s) Performed: Procedure(s) (LRB):  OPEN BICEPS TENODESIS, FIXATION, TENDON (Left)  DEBRIDEMENT, SHOULDER, ARTHROSCOPIC (Left)  REPAIR, ROTATOR CUFF, ARTHROSCOPIC (Left)  ARTHROSCOPY, SHOULDER, WITH SUBACROMIAL DECOMPRESSION (Left)    Patient location: [facility]    Anesthesia Type: general and regional    Transport from OR: Transported from OR on room air with adequate spontaneous ventilation. Transported from OR on 6-10 L/min O2 by face mask with adequate spontaneous ventilation    Post pain: adequate analgesia    Post assessment: no apparent anesthetic complications and tolerated procedure well    Post vital signs: stable    Level of consciousness: sedated and awake    Nausea/Vomiting: no nausea/vomiting    Complications: none    Transfer of care protocol was followed      Last vitals: Visit Vitals  BP (!) 153/89 (BP Location: Right arm, Patient Position: Lying)   Pulse 90   Temp 36.4 °C (97.5 °F) (Temporal)   Resp 20   Ht 5' 7" (1.702 m)   Wt 91.6 kg (202 lb)   SpO2 (!) 93%   BMI 31.64 kg/m²     "
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
## 2023-12-16 NOTE — OP NOTE
[facility]   OPERATIVE REPORT   ORTHOPAEDIC SURGERY   PROVIDER: DR. SALAZAR LÓPEZ    PATIENT INFORMATION   Van Jones 58 y.o. male 1965   MRN: [id number]   LOCATION: [facility]     DATE OF PROCEDURE: 12/15/2023     PREOPERATIVE DIAGNOSES:   Left  1. Shoulder rotator cuff tear, partial thickness  2. Shoulder biceps tendinopathy  3. Shoulder SLAP tear    POSTOPERATIVE DIAGNOSES:   Left  1. Shoulder rotator cuff tear, high grade partial thickness  2. Shoulder biceps tendinopathy  3. Shoulder SLAP tear  4. Shoulder synovitis  5. Shoulder subacromial spurring     OPERATION:   Left  1. Shoulder arthroscopic rotator cuff repair (CPT 30871)  2. Shoulder open subpectoral biceps tenodesis (CPT 89284)  3. Shoulder arthroscopic extensive debridement (CPT 53701)    4. Shoulder arthroscopic subacromial decompression     SURGEON: SALAZAR López MD    ASSISTANTS:  Dillon Zhang DO - Fellow - First Assist  SMA Taran    First Assistant Duties: A first assistant was necessary for this procedure. Assistance was provided for surgical wound exposure, manipulation, and retractor placement and positioning. There was no qualified resident available for the procedure.      ANESTHESIA: General with interscalene block and local anesthetic injection (0.5% Marcaine plain)    ESTIMATED BLOOD LOSS:  40 cc    IMPLANTS:   Implant Name Type Inv. Item Serial No.  Lot No. LRB No. Used Action   ANCHOR BIOCOMP W/3 SUTURES - [id number]  ANCHOR BIOCOMP W/3 SUTURES  [employer] [id number] Left 1 Implanted   SYS IMPL PROXIMAL TENODESIS - [id number]  SYS IMPL PROXIMAL TENODESIS  [employer] [id number] Left 1 Implanted   5.5 mm BioComposite Knotless SwiveLock [place] Self-Punching with Blue #2 Suture    [employer] [id number] Left 2 Implanted      SPECIMENS: None.    COMPLICATIONS: None.     INTRAOPERATIVE COUNTS: Correct.     PROPHYLACTIC IV ANTIBIOTICS: Given per [facility] Protocol.    INDICATIONS FOR PROCEDURE:   Van  Robert 58 y.o. male  has been seen and evaluated in the office for continued left shoulder pain and mechanical symptoms.  After a lengthy discussion and failed nonoperative management, the patient wished to proceed with surgical intervention and was fully informed of risks and benefits.    DETAILS OF PROCEDURE:  After the correct operative site was marked by the operating surgeon, an interscalene block was administered by the anesthesia team.  The patient was then taken to the operating room and placed supine on the operating room table, where the patient underwent general anesthesia by the anesthesia team.  The patient was then rolled into the lateral decubitus position with the operative side up.  A well-padded axillary roll, beanbag and pillows were placed. All pressure points were carefully padded and checked. The upper extremities and both lower extremities were placed in comfortable positions and were also well-padded.      A verbal timeout was confirmed to identify the patient, operative site and planned operative procedure. It was also confirmed the patient had received preoperative IV antibiotic per protocol.     Examination under anesthesia demonstrated: Forward elevation 170 degrees, external rotation with arm to side 70 degrees.    The operative upper extremity was then prepped and draped in the usual sterile fashion.     The Spider arm positioner was implemented with balanced suspension and appropriate landmarks were noted on the skin.  A posterior followed by glenn-superior portals were created and systematic examination of the joint revealed the following:      -Biceps tendinitis at the root attachment with partial long head tendon tearing  -SLAP tear with unstable biceps root  -Diffuse synovitis from anterior to posterior with degenerative labral tearing  -Thickened and inflamed capsular tissue over the anterior rotator interval extending distally through the MGHL  -A posterior push-pull  maneuver with probing was performed demonstrating an intact subscapularis  -The undersurface of the superior cuff was torn with an exposed insertional footprint, anterior at the supraspinatus  -No loose bodies  -Mild superficial delamination of the glenoid articular cartilage, otherwise no glenohumeral focal chondral defects    A shaver was again brought in to clear the field of view and to debride torn labrum and undersurface cuff from anterior to posterior. Extensive synovitis was also removed. Cautery was used to resect the interval to the coracoid and to release capsule through the MGHL. All thickened capsular tissue was released adjacent to the glenoid labrum. Care was taken to protect the axillary nerve during this portion of the procedure.     The biceps was released with curved Layne scissors.     The torn cuff undersurface was debrided carefully with a shaver. Starting at the level of the superior glenoid and moving medially, a hand held rasp was used to complete the capsular sided releases. Care was taken to avoid injury to the suprascapular nerve. The cuff footprint was also debrided and lightly decorticated for healing response.    Attention was then turned to the subacromial space where significant hypertrophic bursa was encountered. Through an anterolateral working portal, shaver and cautery devices were introduced to clear the subacromial space of bursa and adhesions. Bursal reflections to the deltoid fascia anteriorly and posteriorly were taken down to further expand this space. This created a nice room with a view. The undersurface of the acromion was exposed with cautery to delineate bony anatomy. Systematic examination of this space revealed the following:    -Subacromial spurring with narrowing of the anterolateral subacromial interval  -Fraying and degeneration of the CA ligament indicative of chronic outlet impingement  -High grade partial tear involving the supraspinatus. Medium sized tear  after easily completing with the shaver.    The tuberosity was prepared with the shaver and power rasp through accessory posterolateral and posterior portals while looking from the standard anterolateral portal.  The tear was then repaired with Fiberwire suture originating from a single triple loaded Corkscrew medial row anchor with sutures passed in horizontal mattress fashion across the tear. The medial row was then tied for a total of 3 knots. These were then taken laterally to two lateral row Swivelock anchors to complete the transosseous equivalent double row repair configuration. The cuff was repaired to its native position on the greater tuberosity without excessive tension. Following repair, probing of the repair site revealed good tissue apposition to the footprint and good construct stability.     Subacromial decompression was completed using posterior cutting block technique in the standard fashion with a 4.5 mm nikolai without difficulty.  The anterior osteophyte was flattened converting the acromion morphology from a type 3 to a type 1.  Confirmation of adequate resection was confirmed while viewing from the lateral portal and referencing from the posterior acromial undersurface.     The shoulder and subacromial space were then irrigated and fluid was extravasated using suction.     An axillary incision was made overlying the pectoralis inferior border, measuring 3-4 cm in length. The interval between the pectoralis major and medial conjoined and biceps short head was developed bluntly. Dissection was carried down to the humerus and the biceps long head tendon was retrieved from the wound. Tenosynovitis was noted around the tendon. Approximately 10 mm of tendon was whipstitched just proximal to the musculotendinous junction and the remainder of the tendon was then resected. A guidepin was placed, approximately 10 mm proximal to the inferior crossing border of the pectoralis, to create a unicortical hole.  The unicortical biceps button was then dunked into the hole, pulling the free sutures to advance the tendon to the hole for reattachment. One suture limb was then brought back through the tendon and tied back to the other limb to secure the tenodesis. Excellent fixation was achieved with proper tensioning. The wound was then thoroughly irrigated with normal saline.      All portals were reapproximated using 3-0 Nylon. The axillary wound was closed with 3-0 Vicryl and 3-0 Monocryl. Mastisol and steri-strips were placed over the axillary wound. Local anesthetic was injected as well. Xeroform and absorbant mepilex pads were placed to cover all incisions.  A polar care shoulder sleeve was secured followed by a sling with abduction pillow. The patient was then repositioned supine, extubated and taken to the recovery room where the patient arrived in stable condition with the compartments of the arm and forearm soft and good perfusion in all digits.     POSTOPERATIVE PLAN OF CARE:  -Patient will be discharged home according to protocol.  -Physical Therapy: Follow the < 3 cm cuff repair rehab protocol. PROM starts in 2 weeks. AROM starts after 6 weeks. No cuff resistive activity x 12 weeks. No biceps resistive activity x 8 weeks. D/C sling and pillow after 6 weeks.  -DVT prophylaxis: ASA 81 mg twice a day x 2 weeks.

## 2023-12-18 VITALS
SYSTOLIC BLOOD PRESSURE: 109 MMHG | HEIGHT: 67 IN | WEIGHT: 202 LBS | TEMPERATURE: 98 F | RESPIRATION RATE: 14 BRPM | OXYGEN SATURATION: 96 % | HEART RATE: 79 BPM | DIASTOLIC BLOOD PRESSURE: 77 MMHG | BODY MASS INDEX: 31.71 KG/M2

## 2023-12-25 DIAGNOSIS — M75.22 BICEPS TENDINITIS OF LEFT UPPER EXTREMITY: ICD-10-CM

## 2023-12-25 DIAGNOSIS — M75.102 NONTRAUMATIC TEAR OF LEFT ROTATOR CUFF, UNSPECIFIED TEAR EXTENT: ICD-10-CM

## 2023-12-25 DIAGNOSIS — S43.432A SUPERIOR GLENOID LABRUM LESION OF LEFT SHOULDER, INITIAL ENCOUNTER: ICD-10-CM

## 2023-12-27 RX ORDER — FOLIC ACID 1 MG/1
TABLET ORAL
Qty: 30 TABLET | Refills: 1 | Status: SHIPPED | OUTPATIENT
Start: 2023-12-27

## 2023-12-28 ENCOUNTER — OFFICE VISIT (OUTPATIENT)
Dept: SPORTS MEDICINE | Facility: CLINIC | Age: 58
End: 2023-12-28
Payer: MEDICARE

## 2023-12-28 VITALS
WEIGHT: 203.63 LBS | BODY MASS INDEX: 31.96 KG/M2 | DIASTOLIC BLOOD PRESSURE: 89 MMHG | HEART RATE: 83 BPM | SYSTOLIC BLOOD PRESSURE: 140 MMHG | HEIGHT: 67 IN

## 2023-12-28 DIAGNOSIS — Z98.890 S/P LEFT ROTATOR CUFF REPAIR: Primary | ICD-10-CM

## 2023-12-28 PROCEDURE — 99999 PR PBB SHADOW E&M-EST. PATIENT-LVL III: CPT | Mod: PBBFAC,,, | Performed by: PHYSICIAN ASSISTANT

## 2023-12-28 PROCEDURE — 99024 POSTOP FOLLOW-UP VISIT: CPT | Mod: POP,,, | Performed by: PHYSICIAN ASSISTANT

## 2023-12-28 PROCEDURE — 99999 PR PBB SHADOW E&M-EST. PATIENT-LVL III: ICD-10-PCS | Mod: PBBFAC,,, | Performed by: PHYSICIAN ASSISTANT

## 2023-12-28 PROCEDURE — 99213 OFFICE O/P EST LOW 20 MIN: CPT | Mod: PBBFAC | Performed by: PHYSICIAN ASSISTANT

## 2023-12-28 PROCEDURE — 99024 PR POST-OP FOLLOW-UP VISIT: ICD-10-PCS | Mod: POP,,, | Performed by: PHYSICIAN ASSISTANT

## 2023-12-28 RX ORDER — ASPIRIN 81 MG/1
81 TABLET ORAL 2 TIMES DAILY
Qty: 28 TABLET | Refills: 0 | OUTPATIENT
Start: 2023-12-28 | End: 2024-01-11

## 2023-12-28 RX ORDER — HYDROCODONE BITARTRATE AND ACETAMINOPHEN 5; 325 MG/1; MG/1
1 TABLET ORAL EVERY 6 HOURS PRN
Qty: 23 TABLET | Refills: 0 | Status: SHIPPED | OUTPATIENT
Start: 2023-12-28

## 2023-12-28 NOTE — PROGRESS NOTES
S:Van Jones presents for post-operative evaluation.     DATE OF PROCEDURE: 12/15/2023      OPERATION:   Left  1. Shoulder arthroscopic rotator cuff repair (CPT 31570)  2. Shoulder open subpectoral biceps tenodesis (CPT 75946)  3. Shoulder arthroscopic extensive debridement (CPT 39886)    4. Shoulder arthroscopic subacromial decompression     Van Jones reports to be doing well 2wk s/p the above mentioned procedure. Denies fevers, chills, night sweats, chest pain, difficulty breathing, calf pain or tenderness. Going to PT 2xWeek at [facility] in [place]. First appointment is tomorrow. Seeing good progress daily. Pain levels are improving. He has discontinued the Oxy but I still having some pain. Denies numbness/tingling of the LUE..     O: The incisions are healing well.  No signs of infection.  Sutures were removed. No significant pain or unusual tenderness. Bruising/ecchymosis over the biceps.    A/P: Incisions healing well. Ok to shower with incisions uncovered. No submerging at this point. Continue sling adherence. Nothing heavier in his hand that a cup/his phone. Ok to start passive ROM at PT. No cuff or biceps resistance at this time.  Plan to follow the rehab plan as previously outlined. RTC in 4 weeks.     POSTOPERATIVE PLAN OF CARE:  -Patient will be discharged home according to protocol.  -Physical Therapy: Follow the < 3 cm cuff repair rehab protocol. PROM starts in 2 weeks. AROM starts after 6 weeks. No cuff resistive activity x 12 weeks. No biceps resistive activity x 8 weeks. D/C sling and pillow after 6 weeks.  -DVT prophylaxis: ASA 81 mg twice a day x 2 weeks.

## 2024-01-01 RX ORDER — MELOXICAM 15 MG/1
15 TABLET ORAL DAILY PRN
Qty: 30 TABLET | Refills: 1 | Status: SHIPPED | OUTPATIENT
Start: 2024-01-01

## 2024-01-02 ENCOUNTER — TELEPHONE (OUTPATIENT)
Dept: SPORTS MEDICINE | Facility: CLINIC | Age: 59
End: 2024-01-02
Payer: MEDICARE

## 2024-01-02 NOTE — TELEPHONE ENCOUNTER
----- Message from Chanel Morrison sent at 1/2/2024 12:46 PM CST -----  [facility] calling to get pt surgical report     Fax [phone]  call back [phone]

## 2024-01-08 ENCOUNTER — TELEPHONE (OUTPATIENT)
Dept: SPORTS MEDICINE | Facility: CLINIC | Age: 59
End: 2024-01-08
Payer: MEDICARE

## 2024-01-08 NOTE — TELEPHONE ENCOUNTER
Spoke c pt. Informed pt that Dr. Brink will no longer be seeing pts at scheduled appt time on 01/29/24. R/s appt to 01/30/24. Confirmed appt date, time, location. Pt will call c additional questions/concerns in interim. Pt expressed understanding & was thankful.

## 2024-01-15 ENCOUNTER — PATIENT MESSAGE (OUTPATIENT)
Dept: SPORTS MEDICINE | Facility: CLINIC | Age: 59
End: 2024-01-15
Payer: MEDICARE

## 2024-01-25 DIAGNOSIS — M25.512 CHRONIC LEFT SHOULDER PAIN: ICD-10-CM

## 2024-01-25 DIAGNOSIS — G89.29 CHRONIC LEFT SHOULDER PAIN: ICD-10-CM

## 2024-01-25 RX ORDER — METAXALONE 800 MG/1
TABLET ORAL
Qty: 30 TABLET | Refills: 1 | Status: SHIPPED | OUTPATIENT
Start: 2024-01-25

## 2024-01-25 NOTE — PROGRESS NOTES
S:Van Jones presents for post-operative evaluation.     DATE OF PROCEDURE: 12/15/2023   OPERATION:   Left  1. Shoulder arthroscopic rotator cuff repair (CPT 83584)  2. Shoulder open subpectoral biceps tenodesis (CPT 19225)  3. Shoulder arthroscopic extensive debridement (CPT 60045)    4. Shoulder arthroscopic subacromial decompression     Van Jones reports to be doing well 7wk s/p the above mentioned procedure. Going to PT at [facility] in [place]. Seeing good progress daily.  Has some intermittent soreness and pain in the shoulder but nothing too significant.  States that therapy makes the shoulder feel just a bit better.  No numbness or tingling.  No complaints.  Has some questions regarding when he can get back into a boat.     O:  Exam of the left shoulder shows resolving ecchymoses.  Well-healed incisions.  No biceps deformity.  He does have some mild swelling.  No significant areas of tenderness.  Active forward elevation scapular plane to 110, passive to 130-140.  Passive external rotation with arm at side to 40° with some mild stiffness.  Cuff activates well.  No scapular winging.  Motor and sensory intact to the left hand.    A/P:  Arthroscopic pictures reviewed with the patient.  Discussed the rehab plan.  May wean out of sling and pillow.  Begin the active phase of recovery.  No lifting more than a coke can or small book.  Cautioned against doing too much too soon.  I would like for him not to be back into a both for at least another 2 months.  I will see him back in 6 weeks.  I gave him my cell number instructed him to have his physical therapist call me.  I have not discussed his progress yet with his physical therapist.  Prescription given for Celebrex.  Use as needed.

## 2024-01-30 ENCOUNTER — OFFICE VISIT (OUTPATIENT)
Dept: SPORTS MEDICINE | Facility: CLINIC | Age: 59
End: 2024-01-30
Payer: MEDICARE

## 2024-01-30 VITALS
HEART RATE: 98 BPM | BODY MASS INDEX: 32.04 KG/M2 | HEIGHT: 67 IN | WEIGHT: 204.13 LBS | SYSTOLIC BLOOD PRESSURE: 141 MMHG | DIASTOLIC BLOOD PRESSURE: 87 MMHG

## 2024-01-30 DIAGNOSIS — Z98.890 S/P LEFT ROTATOR CUFF REPAIR: Primary | ICD-10-CM

## 2024-01-30 PROCEDURE — 99213 OFFICE O/P EST LOW 20 MIN: CPT | Mod: PBBFAC | Performed by: ORTHOPAEDIC SURGERY

## 2024-01-30 PROCEDURE — 99999 PR PBB SHADOW E&M-EST. PATIENT-LVL III: CPT | Mod: PBBFAC,,, | Performed by: ORTHOPAEDIC SURGERY

## 2024-01-30 PROCEDURE — 99024 POSTOP FOLLOW-UP VISIT: CPT | Mod: POP,,, | Performed by: ORTHOPAEDIC SURGERY

## 2024-01-30 RX ORDER — CELECOXIB 200 MG/1
200 CAPSULE ORAL DAILY
Qty: 40 CAPSULE | Refills: 1 | Status: SHIPPED | OUTPATIENT
Start: 2024-01-30 | End: 2024-04-22

## 2024-02-21 ENCOUNTER — PATIENT MESSAGE (OUTPATIENT)
Dept: SPORTS MEDICINE | Facility: CLINIC | Age: 59
End: 2024-02-21
Payer: MEDICARE

## 2024-02-22 NOTE — PROGRESS NOTES
"  Spoke c pt. He is 9w5d s/p L shoulder RCR, open BT, SAD 12/15/23. He states that celebrex is not addressing his current pain. Denies trauma. Reports compliance c PT & currently restrictions. Denies fever, chills, night sweats, body aches, hotness to touch, red/waxy skin appearance. Denies numbness, tingling, burning, radiating pain. Pain is described at "deep bone pain". He is not taking any other medications for pain at this time. Advised that Dr. Brink is out of the office until 002/26/24 but will relay concern to TOMMY Syed. Confirmed upcoming 3m f/u appt c Dr. Brink. Pt will call c additional questions/concerns in interim. Pt expressed understanding & was thankful.  "

## 2024-02-23 ENCOUNTER — PATIENT MESSAGE (OUTPATIENT)
Dept: SPORTS MEDICINE | Facility: CLINIC | Age: 59
End: 2024-02-23
Payer: MEDICARE

## 2024-02-24 DIAGNOSIS — Z98.890 S/P LEFT ROTATOR CUFF REPAIR: Primary | ICD-10-CM

## 2024-02-24 RX ORDER — METHOCARBAMOL 750 MG/1
750 TABLET, FILM COATED ORAL 3 TIMES DAILY
Qty: 30 TABLET | Refills: 0 | Status: SHIPPED | OUTPATIENT
Start: 2024-02-24 | End: 2024-03-05

## 2024-03-17 NOTE — PROGRESS NOTES
CC: Left shoulder post-op follow up     DATE OF PROCEDURE: 12/15/2023   OPERATION:   Left  1. Shoulder arthroscopic rotator cuff repair (CPT 28713)  2. Shoulder open subpectoral biceps tenodesis (CPT 12565)  3. Shoulder arthroscopic extensive debridement (CPT 71863)    4. Shoulder arthroscopic subacromial decompression     DATE OF PROCEDURE: 6/21/2019   OPERATION:   Right  1. Shoulder open subpectoral biceps tenodesis  2. Shoulder arthroscopic limited debridement    3. Shoulder arthroscopic subacromial decompression     Van Jones presents today for follow up appointment of his left shoulder. Patient is now 3 months 4 days status post above procedure. [facility] to [facility] in [place] 2x/week. Seeing good progress daily.  Patient continues to use NSAIDs daily for his shoulder pain but it is well controlled with that. Reports some mild soreness around his medial clavicle and SC joint region.  Denies any trauma in that area.  Patient states that the recovery after this surgery has been slower and harder than after the prior surgery.  He had no cuff repair on the right side.    Prior Hx 1/30/2024:  Van Jones reports to be doing well 7wk s/p the above mentioned procedure. Going to PT at [facility] in [place]. Seeing good progress daily.  Has some intermittent soreness and pain in the shoulder but nothing too significant.  States that therapy makes the shoulder feel just a bit better.  No numbness or tingling.  No complaints.  Has some questions regarding when he can get back into a boat.     PAST MEDICAL HISTORY:   Past Medical History:   Diagnosis Date    Broken femur     Deep vein thrombosis     Hyperlipidemia     Immune disorder     Joint pain     Nerve damage left    Pulmonary embolism     Reflex sympathetic dystrophy 1983    Left leg after broken femur surgery     PAST SURGICAL HISTORY:  Past Surgical History:   Procedure Laterality Date    ARTHROSCOPIC DEBRIDEMENT OF SHOULDER Left 12/15/2023     Procedure: DEBRIDEMENT, SHOULDER, ARTHROSCOPIC;  Surgeon: TALON Brink MD;  Location: [facility] OR;  Service: Orthopedics;  Laterality: Left;    ARTHROSCOPIC REPAIR OF ROTATOR CUFF OF SHOULDER Left 12/15/2023    Procedure: REPAIR, ROTATOR CUFF, ARTHROSCOPIC;  Surgeon: TALON Brink MD;  Location: [facility] OR;  Service: Orthopedics;  Laterality: Left;    ARTHROSCOPY OF SHOULDER WITH DECOMPRESSION OF SUBACROMIAL SPACE Right 6/21/2019    Procedure: ARTHROSCOPY, SHOULDER, WITH SUBACROMIAL SPACE DECOMPRESSION;  Surgeon: TALON Brink MD;  Location: [facility] OR 1ST FLR;  Service: Orthopedics;  Laterality: Right;    ARTHROSCOPY OF SHOULDER WITH DECOMPRESSION OF SUBACROMIAL SPACE Left 12/15/2023    Procedure: ARTHROSCOPY, SHOULDER, WITH SUBACROMIAL DECOMPRESSION;  Surgeon: TALON Brink MD;  Location: [facility] OR;  Service: Orthopedics;  Laterality: Left;    CERVICAL DISC ARTHROPLASTY  01/23/2018    C3/4    COLONOSCOPY N/A 2/25/2016    Procedure: COLONOSCOPY;  Surgeon: Daryl Viramontes MD;  Location: [facility] ENDO (4TH FLR);  Service: Endoscopy;  Laterality: N/A;  PM Prep      FEMUR SURGERY      FIXATION OF TENDON Left 12/15/2023    Procedure: OPEN BICEPS TENODESIS, FIXATION, TENDON;  Surgeon: TALON Brink MD;  Location: [facility] OR;  Service: Orthopedics;  Laterality: Left;  Regional w/o Catheter, Interscalene    HERNIA REPAIR      umbilical    SHOULDER ARTHROSCOPY Right 6/21/2019    Procedure: ARTHROSCOPY, SHOULDER, BICEPS TENODESIS;  Surgeon: TALON Brink MD;  Location: [facility] OR 1ST FLR;  Service: Orthopedics;  Laterality: Right;  REGIONAL W/CATHETER INTERSCALENE  [employer] notified 6-19 LO  [facility] notified 6-20 LO    SYNOVECTOMY OF SHOULDER Right 6/21/2019    Procedure: SYNOVECTOMY, SHOULDER;  Surgeon: TALON Brink MD;  Location: [facility] OR 1ST FLR;  Service: Orthopedics;  Laterality: Right;    TONSILLECTOMY       FAMILY HISTORY:  Family History   Problem Relation Age of Onset    Heart disease Mother 60     "Neuropathy Mother     Heart disease Father 45    Melanoma Neg Hx      MEDICATIONS:    Current Outpatient Medications:     aspirin (ECOTRIN) 81 MG EC tablet, Take 81 mg by mouth once daily., Disp: , Rfl:     celecoxib (CELEBREX) 200 MG capsule, Take 1 capsule (200 mg total) by mouth once daily., Disp: 40 capsule, Rfl: 1    folic acid (FOLVITE) 1 MG tablet, TAKE 1 TABLET(1 MG) BY MOUTH EVERY DAY, Disp: 30 tablet, Rfl: 1    HYDROcodone-acetaminophen (NORCO) 5-325 mg per tablet, Take 1 tablet by mouth every 6 (six) hours as needed for Pain. (Patient not taking: Reported on 1/30/2024), Disp: 23 tablet, Rfl: 0    meloxicam (MOBIC) 15 MG tablet, TAKE 1 TABLET(15 MG) BY MOUTH DAILY AS NEEDED FOR PAIN, Disp: 30 tablet, Rfl: 1    metaxalone (SKELAXIN) 800 MG tablet, TAKE 1/2 TO 1 TABLET BY MOUTH DAILY AS NEEDED, Disp: 30 tablet, Rfl: 1    methotrexate 2.5 MG Tab, TAKE 5 TABLETS BY MOUTH WEEKLY, Disp: 75 tablet, Rfl: 1    multivitamin with minerals tablet, Take 1 tablet by mouth once daily., Disp: , Rfl:     ondansetron (ZOFRAN-ODT) 4 MG TbDL, Dissolve 1 tablet (4 mg total) by mouth every 8 (eight) hours as needed (nausea)., Disp: 30 tablet, Rfl: 0    oxyCODONE (ROXICODONE) 5 MG immediate release tablet, Take 1-2 tablets (5-10 mg total) by mouth every 4 to 6 hours as needed for Pain. (Patient not taking: Reported on 1/30/2024), Disp: 28 tablet, Rfl: 0    rosuvastatin (CRESTOR) 20 MG tablet, Take 1 tablet (20 mg total) by mouth once daily., Disp: 90 tablet, Rfl: 3    sertraline (ZOLOFT) 100 MG tablet, Take 1 tablet (100 mg total) by mouth once daily., Disp: 90 tablet, Rfl: 1    ALLERGIES:  Review of patient's allergies indicates:   Allergen Reactions    Escitalopram oxalate      "It makes me want to sleep all the time."    Cymbalta [duloxetine] Rash     Red rashes on arms and chest    Lamisil [terbinafine hcl] Rash     REVIEW OF SYSTEMS:  Constitution: Negative. Negative for chills, fever and night sweats.  " "  Hematologic/Lymphatic: Negative for bleeding problem. Does not bruise/bleed easily.   Skin: Negative for dry skin, itching and rash.   Musculoskeletal: Negative for falls.  No significant left shoulder pain.    All other review of symptoms were reviewed and found to be noncontributory.    PHYSICAL EXAMINATION:  Vitals:  BP (!) 145/94   Pulse 93   Ht 5' 7" (1.702 m)   Wt 99 kg (218 lb 5.9 oz)   BMI 34.20 kg/m²    General: Well-developed well-nourished 58 y.o. malein no acute distress   Cardiovascular: Regular rhythm by palpation of distal pulse, normal color and temperature, no concerning varicosities on symptomatic side   Lungs: No labored breathing or wheezing appreciated   Neuro: Alert and oriented ×3   Psychiatric: well oriented to person, place and time, demonstrates normal mood and affect   Skin: No rashes, lesions or ulcers, normal temperature, turgor, and texture on uninvolved extremity    Ortho/SPM Exam  Exam of the left shoulder shows no areas of swelling.  Intact biceps.  No abnormality of the SC joint or AC joint.  Minimal tenderness of the medial clavicle and SC joint.  Static and dynamic scapular protraction.  Active forward elevation to 145-150.  Passive to 160 with some mild stiffness.  Internal rotation to T10.  Active external rotation with arm at side to 50°, passive to 60°.  Good resistance to scaption.  Some weakness as expected.  No pain.  No scapular winging.    IMAGING:  None    ASSESSMENT:      ICD-10-CM ICD-9-CM   1. Shoulder weakness  R29.898 719.61       PLAN:     Clinically making progress.  Discussed may begin the strengthening phase of recovery.  No lifting more than 5 lb for now.  Progress according to protocol.  Discussed the need to continue to protect the shoulder during this phase.  May take anti-inflammatory medication as needed.  I will see him back in 2 months.  At that time we expect more of a full release back to normal day-to-day activity.  For release for all lifting " at 6 months.  All questions answered.    Procedures

## 2024-03-19 ENCOUNTER — OFFICE VISIT (OUTPATIENT)
Dept: SPORTS MEDICINE | Facility: CLINIC | Age: 59
End: 2024-03-19
Payer: MEDICARE

## 2024-03-19 VITALS
HEIGHT: 67 IN | SYSTOLIC BLOOD PRESSURE: 145 MMHG | WEIGHT: 218.38 LBS | DIASTOLIC BLOOD PRESSURE: 94 MMHG | BODY MASS INDEX: 34.28 KG/M2 | HEART RATE: 93 BPM

## 2024-03-19 DIAGNOSIS — R29.898 SHOULDER WEAKNESS: Primary | ICD-10-CM

## 2024-03-19 PROCEDURE — 99213 OFFICE O/P EST LOW 20 MIN: CPT | Mod: S$PBB,,, | Performed by: ORTHOPAEDIC SURGERY

## 2024-03-19 PROCEDURE — 99213 OFFICE O/P EST LOW 20 MIN: CPT | Mod: PBBFAC | Performed by: ORTHOPAEDIC SURGERY

## 2024-03-19 PROCEDURE — 99999 PR PBB SHADOW E&M-EST. PATIENT-LVL III: CPT | Mod: PBBFAC,,, | Performed by: ORTHOPAEDIC SURGERY

## 2024-04-21 DIAGNOSIS — Z98.890 S/P LEFT ROTATOR CUFF REPAIR: ICD-10-CM

## 2024-04-22 RX ORDER — MELOXICAM 15 MG/1
15 TABLET ORAL DAILY PRN
Qty: 30 TABLET | Refills: 1 | Status: SHIPPED | OUTPATIENT
Start: 2024-04-22 | End: 2024-06-18

## 2024-04-22 RX ORDER — CELECOXIB 200 MG/1
200 CAPSULE ORAL
Qty: 40 CAPSULE | Refills: 1 | Status: SHIPPED | OUTPATIENT
Start: 2024-04-22 | End: 2024-06-18

## 2024-05-12 NOTE — PROGRESS NOTES
CC: Left shoulder follow up    DATE OF PROCEDURE: 12/15/2023   OPERATION:   Left  1. Shoulder arthroscopic rotator cuff repair (CPT 61027)  2. Shoulder open subpectoral biceps tenodesis (CPT 98618)  3. Shoulder arthroscopic extensive debridement (CPT 84524)    4. Shoulder arthroscopic subacromial decompression     DATE OF PROCEDURE: 6/21/2019   OPERATION:   Right  1. Shoulder open subpectoral biceps tenodesis  2. Shoulder arthroscopic limited debridement    3. Shoulder arthroscopic subacromial decompression     Van Jones presents today for follow up appointment of his left shoulder. Patient is now 5 months status post above procedure. Continues PT at in [place] 2x/week. Doing excellent. Says he feels like his left shoulder is as good as his right shoulder. No issues, questions or concerns today.     Prior Hx 3/19/2024:   Van Jones presents today for follow up appointment of his left shoulder. Patient is now 3 months 4 days status post above procedure. Going to PT at [facility] in [place] 2x/week. Seeing good progress daily.  Patient continues to use NSAIDs daily for his shoulder pain but it is well controlled with that. Reports some mild soreness around his medial clavicle and SC joint region.  Denies any trauma in that area.  Patient states that the recovery after this surgery has been slower and harder than after the prior surgery.  He had no cuff repair on the right side.    Prior Hx 1/30/2024:  Van Jones reports to be doing well 7wk s/p the above mentioned procedure. Going to PT at [facility] in [place]. Seeing good progress daily.  Has some intermittent soreness and pain in the shoulder but nothing too significant.  States that therapy makes the shoulder feel just a bit better.  No numbness or tingling.  No complaints.  Has some questions regarding when he can get back into a boat.     PAST MEDICAL HISTORY:   Past Medical History:   Diagnosis Date    Broken femur     Deep vein thrombosis      Hyperlipidemia     Immune disorder     Joint pain     Nerve damage left    Pulmonary embolism     Reflex sympathetic dystrophy 1983    Left leg after broken femur surgery     PAST SURGICAL HISTORY:  Past Surgical History:   Procedure Laterality Date    ARTHROSCOPIC DEBRIDEMENT OF SHOULDER Left 12/15/2023    Procedure: DEBRIDEMENT, SHOULDER, ARTHROSCOPIC;  Surgeon: TALON Brink MD;  Location: [facility] OR;  Service: Orthopedics;  Laterality: Left;    ARTHROSCOPIC REPAIR OF ROTATOR CUFF OF SHOULDER Left 12/15/2023    Procedure: REPAIR, ROTATOR CUFF, ARTHROSCOPIC;  Surgeon: TALON Brink MD;  Location: [facility] OR;  Service: Orthopedics;  Laterality: Left;    ARTHROSCOPY OF SHOULDER WITH DECOMPRESSION OF SUBACROMIAL SPACE Right 6/21/2019    Procedure: ARTHROSCOPY, SHOULDER, WITH SUBACROMIAL SPACE DECOMPRESSION;  Surgeon: TALON Brink MD;  Location: [facility] OR 1ST FLR;  Service: Orthopedics;  Laterality: Right;    ARTHROSCOPY OF SHOULDER WITH DECOMPRESSION OF SUBACROMIAL SPACE Left 12/15/2023    Procedure: ARTHROSCOPY, SHOULDER, WITH SUBACROMIAL DECOMPRESSION;  Surgeon: TALON Brink MD;  Location: [facility] OR;  Service: Orthopedics;  Laterality: Left;    CERVICAL DISC ARTHROPLASTY  01/23/2018    C3/4    COLONOSCOPY N/A 2/25/2016    Procedure: COLONOSCOPY;  Surgeon: Daryl Viramontes MD;  Location: [facility] ENDO (4TH FLR);  Service: Endoscopy;  Laterality: N/A;  PM Prep      FEMUR SURGERY      FIXATION OF TENDON Left 12/15/2023    Procedure: OPEN BICEPS TENODESIS, FIXATION, TENDON;  Surgeon: TALON Brink MD;  Location: [facility] OR;  Service: Orthopedics;  Laterality: Left;  Regional w/o Catheter, Interscalene    HERNIA REPAIR      umbilical    SHOULDER ARTHROSCOPY Right 6/21/2019    Procedure: ARTHROSCOPY, SHOULDER, BICEPS TENODESIS;  Surgeon: TALON Brink MD;  Location: [facility] OR 1ST FLR;  Service: Orthopedics;  Laterality: Right;  REGIONAL W/CATHETER INTERSCALENE  [employer] notified 6-19 LO  [facility]rex  notified 6-20 LO    SYNOVECTOMY OF SHOULDER Right 6/21/2019    Procedure: SYNOVECTOMY, SHOULDER;  Surgeon: TALON Brink MD;  Location: [facility] OR [address] [place] [address];  Service: Orthopedics;  Laterality: Right;    TONSILLECTOMY       FAMILY HISTORY:  Family History   Problem Relation Name Age of Onset    Heart disease Mother  60    Neuropathy Mother      Heart disease Father  45    Melanoma Neg Hx       MEDICATIONS:    Current Outpatient Medications:     aspirin (ECOTRIN) 81 MG EC tablet, Take 81 mg by mouth once daily., Disp: , Rfl:     folic acid (FOLVITE) 1 MG tablet, TAKE 1 TABLET(1 MG) BY MOUTH EVERY DAY, Disp: 30 tablet, Rfl: 1    meloxicam (MOBIC) 15 MG tablet, TAKE 1 TABLET(15 MG) BY MOUTH DAILY AS NEEDED FOR PAIN, Disp: 30 tablet, Rfl: 1    methotrexate 2.5 MG Tab, TAKE 5 TABLETS BY MOUTH WEEKLY, Disp: 75 tablet, Rfl: 1    multivitamin with minerals tablet, Take 1 tablet by mouth once daily., Disp: , Rfl:     rosuvastatin (CRESTOR) 20 MG tablet, Take 1 tablet (20 mg total) by mouth once daily., Disp: 90 tablet, Rfl: 3    sertraline (ZOLOFT) 100 MG tablet, Take 1 tablet (100 mg total) by mouth once daily., Disp: 90 tablet, Rfl: 1    celecoxib (CELEBREX) 200 MG capsule, TAKE 1 CAPSULE BY MOUTH ONCE DAILY, Disp: 40 capsule, Rfl: 1    HYDROcodone-acetaminophen (NORCO) 5-325 mg per tablet, Take 1 tablet by mouth every 6 (six) hours as needed for Pain. (Patient not taking: Reported on 1/30/2024), Disp: 23 tablet, Rfl: 0    metaxalone (SKELAXIN) 800 MG tablet, TAKE 1/2 TO 1 TABLET BY MOUTH DAILY AS NEEDED, Disp: 30 tablet, Rfl: 1    ondansetron (ZOFRAN-ODT) 4 MG TbDL, Dissolve 1 tablet (4 mg total) by mouth every 8 (eight) hours as needed (nausea)., Disp: 30 tablet, Rfl: 0    oxyCODONE (ROXICODONE) 5 MG immediate release tablet, Take 1-2 tablets (5-10 mg total) by mouth every 4 to 6 hours as needed for Pain. (Patient not taking: Reported on 1/30/2024), Disp: 28 tablet, Rfl: 0    ALLERGIES:  Review of patient's allergies  "indicates:   Allergen Reactions    Escitalopram oxalate      "It makes me want to sleep all the time."    Cymbalta [duloxetine] Rash     Red rashes on arms and chest    Lamisil [terbinafine hcl] Rash     REVIEW OF SYSTEMS:  Constitution: Negative. Negative for chills, fever and night sweats.    Hematologic/Lymphatic: Negative for bleeding problem. Does not bruise/bleed easily.   Skin: Negative for dry skin, itching and rash.   Musculoskeletal: Negative for falls.  No significant left shoulder pain.    All other review of symptoms were reviewed and found to be noncontributory.    PHYSICAL EXAMINATION:  Vitals:  BP (!) 127/93   Pulse 82   Ht 5' 7" (1.702 m)   Wt 97.8 kg (215 lb 9.8 oz)   BMI 33.77 kg/m²    General: Well-developed well-nourished 58 y.o. malein no acute distress   Cardiovascular: Regular rhythm by palpation of distal pulse, normal color and temperature, no concerning varicosities on symptomatic side   Lungs: No labored breathing or wheezing appreciated   Neuro: Alert and oriented ×3   Psychiatric: well oriented to person, place and time, demonstrates normal mood and affect   Skin: No rashes, lesions or ulcers, normal temperature, turgor, and texture on uninvolved extremity    Ortho/SPM Exam  Exam of the left shoulder shows well healed arthroscopic incisions.  No swelling.  Intact biceps.  Intact overhead active and passive range of motion.  No pain on range of motion.  5/5 resisted scaption and external rotation with arm at side.  No pain.  Good scapular mechanics.    IMAGING:  None    ASSESSMENT:      ICD-10-CM ICD-9-CM   1. Nontraumatic incomplete tear of left rotator cuff  M75.112 726.13   2. S/P left rotator cuff repair  Z98.890 V45.89     PLAN:     Patient is doing quite well.  Demonstrates excellent range of motion and no pain on resisted cuff testing.  Clinically healed.  May resume all day-to-day activity for the most part.  No heavy lifting for another month.  May return to clinic as " needed.  He has been an excellent patient.    Procedures

## 2024-05-14 ENCOUNTER — OFFICE VISIT (OUTPATIENT)
Dept: SPORTS MEDICINE | Facility: CLINIC | Age: 59
End: 2024-05-14
Payer: MEDICARE

## 2024-05-14 VITALS
BODY MASS INDEX: 33.84 KG/M2 | DIASTOLIC BLOOD PRESSURE: 93 MMHG | SYSTOLIC BLOOD PRESSURE: 127 MMHG | HEART RATE: 82 BPM | HEIGHT: 67 IN | WEIGHT: 215.63 LBS

## 2024-05-14 DIAGNOSIS — M75.112 NONTRAUMATIC INCOMPLETE TEAR OF LEFT ROTATOR CUFF: Primary | ICD-10-CM

## 2024-05-14 DIAGNOSIS — Z98.890 S/P LEFT ROTATOR CUFF REPAIR: ICD-10-CM

## 2024-05-14 PROCEDURE — 99213 OFFICE O/P EST LOW 20 MIN: CPT | Mod: PBBFAC | Performed by: ORTHOPAEDIC SURGERY

## 2024-05-14 PROCEDURE — 99999 PR PBB SHADOW E&M-EST. PATIENT-LVL III: CPT | Mod: PBBFAC,,, | Performed by: ORTHOPAEDIC SURGERY

## 2024-05-14 PROCEDURE — 99213 OFFICE O/P EST LOW 20 MIN: CPT | Mod: S$PBB,,, | Performed by: ORTHOPAEDIC SURGERY

## 2024-05-21 DIAGNOSIS — M35.00 SJOGREN'S SYNDROME, WITH UNSPECIFIED ORGAN INVOLVEMENT: ICD-10-CM

## 2024-05-21 RX ORDER — METHOTREXATE 2.5 MG/1
TABLET ORAL
Qty: 64 TABLET | Refills: 0 | Status: SHIPPED | OUTPATIENT
Start: 2024-05-21

## 2024-05-25 DIAGNOSIS — F33.1 MODERATE EPISODE OF RECURRENT MAJOR DEPRESSIVE DISORDER: ICD-10-CM

## 2024-05-26 RX ORDER — SERTRALINE HYDROCHLORIDE 100 MG/1
100 TABLET, FILM COATED ORAL
Qty: 90 TABLET | Refills: 1 | Status: SHIPPED | OUTPATIENT
Start: 2024-05-26

## 2024-06-18 RX ORDER — MELOXICAM 15 MG/1
15 TABLET ORAL DAILY PRN
Qty: 30 TABLET | Refills: 1 | Status: SHIPPED | OUTPATIENT
Start: 2024-06-18

## 2024-06-22 ENCOUNTER — PATIENT MESSAGE (OUTPATIENT)
Dept: RHEUMATOLOGY | Facility: CLINIC | Age: 59
End: 2024-06-22
Payer: MEDICARE

## 2024-06-22 RX ORDER — FOLIC ACID 1 MG/1
TABLET ORAL
Qty: 90 TABLET | Refills: 1 | Status: SHIPPED | OUTPATIENT
Start: 2024-06-22

## 2024-08-01 ENCOUNTER — TELEPHONE (OUTPATIENT)
Dept: RHEUMATOLOGY | Facility: CLINIC | Age: 59
End: 2024-08-01
Payer: MEDICARE

## 2024-08-17 DIAGNOSIS — M35.00 SJOGREN'S SYNDROME, WITH UNSPECIFIED ORGAN INVOLVEMENT: ICD-10-CM

## 2024-08-17 RX ORDER — METHOTREXATE 2.5 MG/1
TABLET ORAL
Qty: 64 TABLET | Refills: 0 | Status: SHIPPED | OUTPATIENT
Start: 2024-08-17

## 2024-08-29 DIAGNOSIS — F33.1 MODERATE EPISODE OF RECURRENT MAJOR DEPRESSIVE DISORDER: ICD-10-CM

## 2024-08-30 RX ORDER — SERTRALINE HYDROCHLORIDE 100 MG/1
100 TABLET, FILM COATED ORAL
Qty: 90 TABLET | Refills: 1 | Status: SHIPPED | OUTPATIENT
Start: 2024-08-30

## 2024-09-03 ENCOUNTER — TELEPHONE (OUTPATIENT)
Dept: RHEUMATOLOGY | Facility: CLINIC | Age: 59
End: 2024-09-03
Payer: MEDICARE

## 2024-09-30 RX ORDER — ROSUVASTATIN CALCIUM 20 MG/1
20 TABLET, COATED ORAL
Qty: 90 TABLET | Refills: 3 | Status: SHIPPED | OUTPATIENT
Start: 2024-09-30

## 2024-11-04 ENCOUNTER — PATIENT MESSAGE (OUTPATIENT)
Dept: RESEARCH | Facility: HOSPITAL | Age: 59
End: 2024-11-04
Payer: MEDICARE

## 2024-11-05 ENCOUNTER — PATIENT MESSAGE (OUTPATIENT)
Dept: RESEARCH | Facility: HOSPITAL | Age: 59
End: 2024-11-05
Payer: MEDICARE

## 2024-12-16 ENCOUNTER — TELEPHONE (OUTPATIENT)
Dept: RHEUMATOLOGY | Facility: CLINIC | Age: 59
End: 2024-12-16
Payer: MEDICARE

## 2024-12-16 DIAGNOSIS — G90.50 RSD (REFLEX SYMPATHETIC DYSTROPHY): Primary | ICD-10-CM

## 2024-12-16 DIAGNOSIS — M35.00 SJOGREN'S SYNDROME, WITH UNSPECIFIED ORGAN INVOLVEMENT: ICD-10-CM

## 2024-12-16 RX ORDER — METHOTREXATE 2.5 MG/1
TABLET ORAL
Qty: 64 TABLET | Refills: 0 | Status: SHIPPED | OUTPATIENT
Start: 2024-12-16

## 2024-12-16 RX ORDER — FOLIC ACID 1 MG/1
1 TABLET ORAL DAILY
Qty: 90 TABLET | Refills: 1 | Status: SHIPPED | OUTPATIENT
Start: 2024-12-16

## 2024-12-27 ENCOUNTER — OFFICE VISIT (OUTPATIENT)
Dept: URGENT CARE | Facility: CLINIC | Age: 59
End: 2024-12-27
Payer: MEDICARE

## 2024-12-27 VITALS
TEMPERATURE: 100 F | WEIGHT: 213.88 LBS | HEART RATE: 98 BPM | OXYGEN SATURATION: 98 % | BODY MASS INDEX: 33.57 KG/M2 | DIASTOLIC BLOOD PRESSURE: 88 MMHG | RESPIRATION RATE: 20 BRPM | SYSTOLIC BLOOD PRESSURE: 132 MMHG | HEIGHT: 67 IN

## 2024-12-27 DIAGNOSIS — B34.9 ACUTE VIRAL SYNDROME: ICD-10-CM

## 2024-12-27 DIAGNOSIS — R50.9 FEVER, UNSPECIFIED FEVER CAUSE: ICD-10-CM

## 2024-12-27 DIAGNOSIS — R68.89 FLU-LIKE SYMPTOMS: Primary | ICD-10-CM

## 2024-12-27 LAB
CTP QC/QA: YES
CTP QC/QA: YES
POC MOLECULAR INFLUENZA A AGN: NEGATIVE
POC MOLECULAR INFLUENZA B AGN: NEGATIVE
SARS-COV-2 AG RESP QL IA.RAPID: NEGATIVE

## 2024-12-27 RX ORDER — OSELTAMIVIR PHOSPHATE 75 MG/1
75 CAPSULE ORAL 2 TIMES DAILY
Qty: 10 CAPSULE | Refills: 0 | Status: SHIPPED | OUTPATIENT
Start: 2024-12-27 | End: 2025-01-01

## 2024-12-27 NOTE — PROGRESS NOTES
"Subjective:      Patient ID: Van Jones is a 59 y.o. male.    Vitals:  height is 5' 7" (1.702 m) and weight is 97 kg (213 lb 13.5 oz). His temperature is 99.8 °F (37.7 °C). His blood pressure is 132/88 and his pulse is 98. His respiration is 20 and oxygen saturation is 98%.     Chief Complaint: Sore Throat    Pt present with sore throat, fever, runny nose, headaches. Sx started today. Tx include motrin with no relief.     Sore Throat   This is a new problem. The current episode started today. The problem has been unchanged. The pain is at a severity of 6/10. The pain is mild. Associated symptoms include congestion, coughing, headaches and trouble swallowing. Pertinent negatives include no abdominal pain, ear discharge, ear pain, neck pain, shortness of breath or vomiting. He has tried NSAIDs for the symptoms.     Constitution: Positive for chills and fatigue. Negative for fever.   HENT:  Positive for congestion, sore throat and trouble swallowing. Negative for ear pain, ear discharge, facial swelling and sinus pressure.    Neck: Negative for neck pain, neck stiffness and painful lymph nodes.   Cardiovascular: Negative.  Negative for chest pain and sob on exertion.   Eyes: Negative.  Negative for eye itching, eye pain and eye redness.   Respiratory:  Positive for cough. Negative for chest tightness, shortness of breath, wheezing and asthma.    Gastrointestinal: Negative.  Negative for abdominal pain, nausea and vomiting.   Endocrine: negative. excessive thirst.   Genitourinary: Negative.  Negative for dysuria, frequency, urgency and flank pain.   Musculoskeletal: Negative.  Negative for pain, trauma, joint pain and joint swelling.   Skin: Negative.  Negative for rash, wound, lesion and hives.   Allergic/Immunologic: Negative.  Negative for eczema, asthma, hives, itching and sneezing.   Neurological:  Positive for headaches. Negative for dizziness, passing out, disorientation and altered mental status. "   Hematologic/Lymphatic: Negative.  Negative for swollen lymph nodes.   Psychiatric/Behavioral: Negative.  Negative for altered mental status, disorientation and confusion.       Objective:     Physical Exam   Constitutional: He is oriented to person, place, and time. He appears well-developed. He is cooperative.  Non-toxic appearance. He does not appear ill. No distress.   HENT:   Head: Normocephalic and atraumatic.   Ears:   Right Ear: Hearing, tympanic membrane, external ear and ear canal normal. no impacted cerumen  Left Ear: Hearing, tympanic membrane, external ear and ear canal normal. no impacted cerumen  Nose: Mucosal edema present. No rhinorrhea, nasal deformity or congestion. No epistaxis. Right sinus exhibits no maxillary sinus tenderness and no frontal sinus tenderness. Left sinus exhibits no maxillary sinus tenderness and no frontal sinus tenderness.   Mouth/Throat: Uvula is midline, oropharynx is clear and moist and mucous membranes are normal. No trismus in the jaw. Normal dentition. No uvula swelling. No oropharyngeal exudate, posterior oropharyngeal edema, posterior oropharyngeal erythema, tonsillar abscesses or cobblestoning. Tonsils are 0 on the right. Tonsils are 0 on the left. No tonsillar exudate.   Eyes: Conjunctivae and lids are normal. No scleral icterus.   Neck: Trachea normal and phonation normal. Neck supple. No edema present. No erythema present. No neck rigidity present.   Cardiovascular: Normal rate, regular rhythm, normal heart sounds and normal pulses.   Pulmonary/Chest: Effort normal and breath sounds normal. No stridor. No respiratory distress. He has no decreased breath sounds. He has no wheezes. He has no rhonchi. He has no rales. He exhibits no tenderness.   Abdominal: Normal appearance.   Musculoskeletal: Normal range of motion.         General: No deformity. Normal range of motion.   Lymphadenopathy:     He has no cervical adenopathy.   Neurological: no focal deficit. He is  alert, oriented to person, place, and time and at baseline. He exhibits normal muscle tone. Coordination normal.   Skin: Skin is warm, dry, intact, not diaphoretic and not pale.   Psychiatric: His speech is normal and behavior is normal. Mood, judgment and thought content normal.   Nursing note and vitals reviewed.    Results for orders placed or performed in visit on 12/27/24   SARS Coronavirus 2 Antigen, POCT Manual Read    Collection Time: 12/27/24  4:50 PM   Result Value Ref Range    SARS Coronavirus 2 Antigen Negative Negative     Acceptable Yes    POCT Influenza A/B MOLECULAR    Collection Time: 12/27/24  4:50 PM   Result Value Ref Range    POC Molecular Influenza A Ag Negative Negative    POC Molecular Influenza B Ag Negative Negative     Acceptable Yes          Assessment:     1. Flu-like symptoms    2. Fever, unspecified fever cause    3. Acute viral syndrome        Plan:   Patient would like to be treated with Tamiflu for current flu-like symptoms.  Negative flu test in urgent care today.    FOLLOWUP  Follow up if symptoms worsen or fail to improve, for PLEASE CONTACT PCP OR CONTACT THE EMERGENCY ROOM..     PATIENT INSTRUCTIONS  Patient Instructions   INSTRUCTIONS:  - Rest.  - Drink plenty of fluids.  - Take Tylenol and/or Ibuprofen as directed as needed for fever/pain.  Do not take more than the recommended dose.  - follow up with your PCP within the next 1-2 weeks as needed.  - You must understand that you have received an Urgent Care treatment only and that you may be released before all of your medical problems are known or treated.   - You, the patient, will arrange for follow up care as instructed.   - If your condition worsens or fails to improve we recommend that you receive another evaluation at the ER immediately or contact your PCP to discuss your concerns.   - You can call [phone] or [phone] to help schedule an appointment with the appropriate  provider.     -If you smoke cigarettes, it would be beneficial for you to stop.    OVER THE COUNTER RECOMMENDATIONS/SUGGESTIONS.     Make sure to stay well hydrated.     Use Nasal Saline to mechanically move any post nasal drip from your eustachian tube or from the back of your throat.     Use warm salt water gargles to ease your throat pain. Warm salt water gargles as needed for sore throat-  1/2 tsp salt to 1 cup warm water, gargle as desired.     Use an antihistamine such as Claritin, Zyrtec or Allegra to dry you out.      Use pseudoephedrine (behind the counter) to decongest. Pseudoephedrine  30 mg up to 240 mg /day. It can raise your blood pressure and give you palpitations.     Use mucinex (guaifenisin) to break up mucous up to 2400mg/day to loosen any mucous.   The mucinex DM pill has a cough suppressant that can be sedating. It can be used at night to stop the tickle at the back of your throat.  You can use Mucinex D (it has guaifenesin and a high dose of pseudoephedrine) in the mornings to help decongest.        Use Flonase 1-2 sprays/nostril per day. It is a local acting steroid nasal spray, if you develop a bloody nose, stop using the medication immediately.     Sometimes Nyquil at night is beneficial to help you get some rest, however it is sedating and it does have an antihistamine, and tylenol.     Honey is a natural cough suppressant that can be used.     Tylenol up to 4,000 mg a day is safe for short periods and can be used for body aches, pain, and fever. However in high doses and prolonged use it can cause liver irritation.     Ibuprofen is a non-steroidal anti-inflammatory that can be used for body aches, pain, and fever.However it can also cause stomach irritation if over used.         THANK YOU FOR ALLOWING ME TO PARTICIPATE IN YOUR HEALTHCARE,     He Ovalle NP     Flu-like symptoms  -     oseltamivir (TAMIFLU) 75 MG capsule; Take 1 capsule (75 mg total) by mouth 2 (two) times daily. for  5 days  Dispense: 10 capsule; Refill: 0    Fever, unspecified fever cause  -     SARS Coronavirus 2 Antigen, POCT Manual Read  -     POCT Influenza A/B MOLECULAR    Acute viral syndrome

## 2024-12-27 NOTE — PATIENT INSTRUCTIONS
INSTRUCTIONS:  - Rest.  - Drink plenty of fluids.  - Take Tylenol and/or Ibuprofen as directed as needed for fever/pain.  Do not take more than the recommended dose.  - follow up with your PCP within the next 1-2 weeks as needed.  - You must understand that you have received an Urgent Care treatment only and that you may be released before all of your medical problems are known or treated.   - You, the patient, will arrange for follow up care as instructed.   - If your condition worsens or fails to improve we recommend that you receive another evaluation at the ER immediately or contact your PCP to discuss your concerns.   - You can call [phone] or [phone] to help schedule an appointment with the appropriate provider.     -If you smoke cigarettes, it would be beneficial for you to stop.    OVER THE COUNTER RECOMMENDATIONS/SUGGESTIONS.     Make sure to stay well hydrated.     Use Nasal Saline to mechanically move any post nasal drip from your eustachian tube or from the back of your throat.     Use warm salt water gargles to ease your throat pain. Warm salt water gargles as needed for sore throat-  1/2 tsp salt to 1 cup warm water, gargle as desired.     Use an antihistamine such as Claritin, Zyrtec or Allegra to dry you out.      Use pseudoephedrine (behind the counter) to decongest. Pseudoephedrine  30 mg up to 240 mg /day. It can raise your blood pressure and give you palpitations.     Use mucinex (guaifenisin) to break up mucous up to 2400mg/day to loosen any mucous.   The mucinex DM pill has a cough suppressant that can be sedating. It can be used at night to stop the tickle at the back of your throat.  You can use Mucinex D (it has guaifenesin and a high dose of pseudoephedrine) in the mornings to help decongest.        Use Flonase 1-2 sprays/nostril per day. It is a local acting steroid nasal spray, if you develop a bloody nose, stop using the medication immediately.     Sometimes Nyquil at night  is beneficial to help you get some rest, however it is sedating and it does have an antihistamine, and tylenol.     Honey is a natural cough suppressant that can be used.     Tylenol up to 4,000 mg a day is safe for short periods and can be used for body aches, pain, and fever. However in high doses and prolonged use it can cause liver irritation.     Ibuprofen is a non-steroidal anti-inflammatory that can be used for body aches, pain, and fever.However it can also cause stomach irritation if over used.

## 2025-01-16 ENCOUNTER — OFFICE VISIT (OUTPATIENT)
Dept: RHEUMATOLOGY | Facility: CLINIC | Age: 60
End: 2025-01-16
Payer: MEDICARE

## 2025-01-16 VITALS
SYSTOLIC BLOOD PRESSURE: 155 MMHG | BODY MASS INDEX: 33.67 KG/M2 | DIASTOLIC BLOOD PRESSURE: 97 MMHG | WEIGHT: 214.5 LBS | HEIGHT: 67 IN | HEART RATE: 103 BPM

## 2025-01-16 DIAGNOSIS — M47.812 SPONDYLOSIS OF CERVICAL REGION WITHOUT MYELOPATHY OR RADICULOPATHY: ICD-10-CM

## 2025-01-16 DIAGNOSIS — M35.00 SJOGREN'S SYNDROME, WITH UNSPECIFIED ORGAN INVOLVEMENT: Primary | ICD-10-CM

## 2025-01-16 DIAGNOSIS — M54.9 MID BACK PAIN: ICD-10-CM

## 2025-01-16 DIAGNOSIS — M45.A2 NON-RADIOGRAPHIC AXIAL SPONDYLOARTHRITIS OF CERVICAL REGION: ICD-10-CM

## 2025-01-16 DIAGNOSIS — G90.50 RSD (REFLEX SYMPATHETIC DYSTROPHY): ICD-10-CM

## 2025-01-16 PROCEDURE — 99999 PR PBB SHADOW E&M-EST. PATIENT-LVL III: CPT | Mod: PBBFAC,,, | Performed by: INTERNAL MEDICINE

## 2025-01-16 PROCEDURE — 99215 OFFICE O/P EST HI 40 MIN: CPT | Mod: S$PBB,,, | Performed by: INTERNAL MEDICINE

## 2025-01-16 PROCEDURE — 99213 OFFICE O/P EST LOW 20 MIN: CPT | Mod: PBBFAC,PO | Performed by: INTERNAL MEDICINE

## 2025-01-16 RX ORDER — ETODOLAC 400 MG/1
400 TABLET, FILM COATED ORAL 2 TIMES DAILY
Qty: 60 TABLET | Refills: 6 | Status: SHIPPED | OUTPATIENT
Start: 2025-01-16 | End: 2025-01-27 | Stop reason: SDUPTHER

## 2025-01-16 RX ORDER — TIZANIDINE 4 MG/1
4 TABLET ORAL EVERY 8 HOURS
Qty: 30 TABLET | Refills: 0 | Status: SHIPPED | OUTPATIENT
Start: 2025-01-16 | End: 2025-01-27 | Stop reason: SDUPTHER

## 2025-01-16 RX ORDER — HYDROCODONE BITARTRATE AND ACETAMINOPHEN 7.5; 325 MG/1; MG/1
1 TABLET ORAL EVERY 8 HOURS PRN
Qty: 21 TABLET | Refills: 0 | Status: SHIPPED | OUTPATIENT
Start: 2025-01-16 | End: 2025-01-27 | Stop reason: SDUPTHER

## 2025-01-17 ENCOUNTER — HOSPITAL ENCOUNTER (OUTPATIENT)
Dept: RADIOLOGY | Facility: HOSPITAL | Age: 60
Discharge: HOME | End: 2025-01-17
Attending: INTERNAL MEDICINE
Payer: MEDICARE

## 2025-01-17 ENCOUNTER — TELEPHONE (OUTPATIENT)
Dept: RHEUMATOLOGY | Facility: CLINIC | Age: 60
End: 2025-01-17
Payer: MEDICARE

## 2025-01-17 DIAGNOSIS — G90.50 RSD (REFLEX SYMPATHETIC DYSTROPHY): ICD-10-CM

## 2025-01-17 DIAGNOSIS — M47.812 SPONDYLOSIS OF CERVICAL REGION WITHOUT MYELOPATHY OR RADICULOPATHY: ICD-10-CM

## 2025-01-17 DIAGNOSIS — M54.9 MID BACK PAIN: ICD-10-CM

## 2025-01-17 DIAGNOSIS — M35.00 SJOGREN'S SYNDROME, WITH UNSPECIFIED ORGAN INVOLVEMENT: ICD-10-CM

## 2025-01-17 PROCEDURE — 72070 X-RAY EXAM THORAC SPINE 2VWS: CPT | Mod: TC,FY,PO

## 2025-01-17 PROCEDURE — 72070 X-RAY EXAM THORAC SPINE 2VWS: CPT | Mod: 26,,, | Performed by: RADIOLOGY

## 2025-01-17 PROCEDURE — 72040 X-RAY EXAM NECK SPINE 2-3 VW: CPT | Mod: 26,,, | Performed by: RADIOLOGY

## 2025-01-17 PROCEDURE — 72040 X-RAY EXAM NECK SPINE 2-3 VW: CPT | Mod: TC,FY,PO

## 2025-01-17 NOTE — TELEPHONE ENCOUNTER
Let pt know that I booked him in June and put him on the wait list for an appt in May since Dr. Muniz wanted to see him back in 3-4 months.

## 2025-01-17 NOTE — PROGRESS NOTES
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     Sjogrens syndrome-B extractable nuclear antibody; Future; Expected date: 01/16/2025  -     Rheumatoid Factor; Future; Expected date: 01/16/2025  -     CYCLIC CITRUL PEPTIDE ANTIBODY, IGG; Future; Expected date: 01/16/2025  -     X-Ray Cervical Spine 2 or 3 Views; Future; Expected date: 01/16/2025  -     X-Ray Thoracic Spine AP Lateral; Future; Expected date: 01/16/2025  -     HLA B27 Antigen; Future; Expected date: 01/16/2025  -     Hepatitis C Antibody; Future; Expected date: 01/16/2025  -     Quantiferon Gold TB; Future; Expected date: 01/16/2025  -     Hepatitis B Surface Antigen; Future; Expected date: 01/16/2025  -     Hepatitis B Surface Antibody, Qual/Quant; Future; Expected date: 01/16/2025  -     Hepatitis B Core Antibody, Total; Future; Expected date: 01/16/2025  -     Hepatitis B Surface Ab, Qualitative; Future; Expected date: 01/16/2025  -     HYDROcodone-acetaminophen (NORCO) 7.5-325 mg per tablet; Take 1 tablet by mouth every 8 (eight) hours as needed for Pain.  Dispense: 21 tablet; Refill: 0  -     etodolac (LODINE) 400  MG tablet; Take 1 tablet (400 mg total) by mouth 2 (two) times daily.  Dispense: 60 tablet; Refill: 6  -     tiZANidine (ZANAFLEX) 4 MG tablet; Take 1 tablet (4 mg total) by mouth every 8 (eight) hours. for 10 days  Dispense: 30 tablet; Refill: 0    Mid back pain  -     Sedimentation rate; Future; Expected date: 01/16/2025  -     C-Reactive Protein; Future; Expected date: 01/16/2025  -     Comprehensive Metabolic Panel; Future; Expected date: 01/16/2025  -     CBC Auto Differential; Future; Expected date: 01/16/2025  -     Sjogrens syndrome-A extractable nuclear antibody; Future; Expected date: 01/16/2025  -     Sjogrens syndrome-B extractable nuclear antibody; Future; Expected date: 01/16/2025  -     Rheumatoid Factor; Future; Expected date: 01/16/2025  -     CYCLIC CITRUL PEPTIDE ANTIBODY, IGG; Future; Expected date: 01/16/2025  -     X-Ray Cervical Spine 2 or 3 Views; Future; Expected date: 01/16/2025  -     X-Ray Thoracic Spine AP Lateral; Future; Expected date: 01/16/2025  -     HLA B27 Antigen; Future; Expected date: 01/16/2025  -     Hepatitis C Antibody; Future; Expected date: 01/16/2025  -     Quantiferon Gold TB; Future; Expected date: 01/16/2025  -     Hepatitis B Surface Antigen; Future; Expected date: 01/16/2025  -     Hepatitis B Surface Antibody, Qual/Quant; Future; Expected date: 01/16/2025  -     Hepatitis B Core Antibody, Total; Future; Expected date: 01/16/2025  -     Hepatitis B Surface Ab, Qualitative; Future; Expected date: 01/16/2025  -     HYDROcodone-acetaminophen (NORCO) 7.5-325 mg per tablet; Take 1 tablet by mouth every 8 (eight) hours as needed for Pain.  Dispense: 21 tablet; Refill: 0  -     etodolac (LODINE) 400 MG tablet; Take 1 tablet (400 mg total) by mouth 2 (two) times daily.  Dispense: 60 tablet; Refill: 6  -     tiZANidine (ZANAFLEX) 4 MG tablet; Take 1 tablet (4 mg total) by mouth every 8 (eight) hours. for 10 days  Dispense: 30 tablet; Refill: 0    Non-radiographic axial  spondyloarthritis of cervical region  -     HLA B27 Antigen; Future; Expected date: 01/16/2025  -     HYDROcodone-acetaminophen (NORCO) 7.5-325 mg per tablet; Take 1 tablet by mouth every 8 (eight) hours as needed for Pain.  Dispense: 21 tablet; Refill: 0  -     etodolac (LODINE) 400 MG tablet; Take 1 tablet (400 mg total) by mouth 2 (two) times daily.  Dispense: 60 tablet; Refill: 6  -     tiZANidine (ZANAFLEX) 4 MG tablet; Take 1 tablet (4 mg total) by mouth every 8 (eight) hours. for 10 days  Dispense: 30 tablet; Refill: 0        Plan:      Encounter Diagnoses   Name Primary?    Sjogren's syndrome, with unspecified organ involvement Yes    RSD (reflex sympathetic dystrophy)     Spondylosis of cervical region without myelopathy or radiculopathy     Mid back pain     Non-radiographic axial spondyloarthritis of cervical region      Van was seen today for follow-up.    Diagnoses and all orders for this visit:    Sjogren's syndrome, with unspecified organ involvement  -     Sedimentation rate; Future  -     C-Reactive Protein; Future  -     Comprehensive Metabolic Panel; Future  -     CBC Auto Differential; Future  -     Sjogrens syndrome-A extractable nuclear antibody; Future  -     Sjogrens syndrome-B extractable nuclear antibody; Future  -     Rheumatoid Factor; Future  -     CYCLIC CITRUL PEPTIDE ANTIBODY, IGG; Future  -     X-Ray Cervical Spine 2 or 3 Views; Future  -     X-Ray Thoracic Spine AP Lateral; Future  -     HLA B27 Antigen; Future  -     Hepatitis C Antibody; Future  -     Quantiferon Gold TB; Future  -     Hepatitis B Surface Antigen; Future  -     Hepatitis B Surface Antibody, Qual/Quant; Future  -     Hepatitis B Core Antibody, Total; Future  -     Hepatitis B Surface Ab, Qualitative; Future  -     HYDROcodone-acetaminophen (NORCO) 7.5-325 mg per tablet; Take 1 tablet by mouth every 8 (eight) hours as needed for Pain.  -     etodolac (LODINE) 400 MG tablet; Take 1 tablet (400 mg total) by mouth 2  (two) times daily.  -     tiZANidine (ZANAFLEX) 4 MG tablet; Take 1 tablet (4 mg total) by mouth every 8 (eight) hours. for 10 days  -     PROTEIN ELECTROPHORESIS, SERUM; Future  -     Lymphocyte Profile II; Future  -     IgA; Future  -     Humoral Immune Eval (Pneumo Serotypes) With H. Flu; Future  -     IgM; Future  -     IgG; Future  -     IgG 1, 2, 3, and 4; Future    RSD (reflex sympathetic dystrophy)  -     Sedimentation rate; Future  -     C-Reactive Protein; Future  -     Comprehensive Metabolic Panel; Future  -     CBC Auto Differential; Future  -     Sjogrens syndrome-A extractable nuclear antibody; Future  -     Sjogrens syndrome-B extractable nuclear antibody; Future  -     Rheumatoid Factor; Future  -     CYCLIC CITRUL PEPTIDE ANTIBODY, IGG; Future  -     X-Ray Cervical Spine 2 or 3 Views; Future  -     X-Ray Thoracic Spine AP Lateral; Future  -     HLA B27 Antigen; Future  -     Hepatitis C Antibody; Future  -     Quantiferon Gold TB; Future  -     Hepatitis B Surface Antigen; Future  -     Hepatitis B Surface Antibody, Qual/Quant; Future  -     Hepatitis B Core Antibody, Total; Future  -     Hepatitis B Surface Ab, Qualitative; Future  -     HYDROcodone-acetaminophen (NORCO) 7.5-325 mg per tablet; Take 1 tablet by mouth every 8 (eight) hours as needed for Pain.  -     etodolac (LODINE) 400 MG tablet; Take 1 tablet (400 mg total) by mouth 2 (two) times daily.  -     tiZANidine (ZANAFLEX) 4 MG tablet; Take 1 tablet (4 mg total) by mouth every 8 (eight) hours. for 10 days    Spondylosis of cervical region without myelopathy or radiculopathy  -     Sedimentation rate; Future  -     C-Reactive Protein; Future  -     Comprehensive Metabolic Panel; Future  -     CBC Auto Differential; Future  -     Sjogrens syndrome-A extractable nuclear antibody; Future  -     Sjogrens syndrome-B extractable nuclear antibody; Future  -     Rheumatoid Factor; Future  -     CYCLIC CITRUL PEPTIDE ANTIBODY, IGG; Future  -      X-Ray Cervical Spine 2 or 3 Views; Future  -     X-Ray Thoracic Spine AP Lateral; Future  -     HLA B27 Antigen; Future  -     Hepatitis C Antibody; Future  -     Quantiferon Gold TB; Future  -     Hepatitis B Surface Antigen; Future  -     Hepatitis B Surface Antibody, Qual/Quant; Future  -     Hepatitis B Core Antibody, Total; Future  -     Hepatitis B Surface Ab, Qualitative; Future  -     HYDROcodone-acetaminophen (NORCO) 7.5-325 mg per tablet; Take 1 tablet by mouth every 8 (eight) hours as needed for Pain.  -     etodolac (LODINE) 400 MG tablet; Take 1 tablet (400 mg total) by mouth 2 (two) times daily.  -     tiZANidine (ZANAFLEX) 4 MG tablet; Take 1 tablet (4 mg total) by mouth every 8 (eight) hours. for 10 days    Mid back pain  -     Sedimentation rate; Future  -     C-Reactive Protein; Future  -     Comprehensive Metabolic Panel; Future  -     CBC Auto Differential; Future  -     Sjogrens syndrome-A extractable nuclear antibody; Future  -     Sjogrens syndrome-B extractable nuclear antibody; Future  -     Rheumatoid Factor; Future  -     CYCLIC CITRUL PEPTIDE ANTIBODY, IGG; Future  -     X-Ray Cervical Spine 2 or 3 Views; Future  -     X-Ray Thoracic Spine AP Lateral; Future  -     HLA B27 Antigen; Future  -     Hepatitis C Antibody; Future  -     Quantiferon Gold TB; Future  -     Hepatitis B Surface Antigen; Future  -     Hepatitis B Surface Antibody, Qual/Quant; Future  -     Hepatitis B Core Antibody, Total; Future  -     Hepatitis B Surface Ab, Qualitative; Future  -     HYDROcodone-acetaminophen (NORCO) 7.5-325 mg per tablet; Take 1 tablet by mouth every 8 (eight) hours as needed for Pain.  -     etodolac (LODINE) 400 MG tablet; Take 1 tablet (400 mg total) by mouth 2 (two) times daily.  -     tiZANidine (ZANAFLEX) 4 MG tablet; Take 1 tablet (4 mg total) by mouth every 8 (eight) hours. for 10 days    Non-radiographic axial spondyloarthritis of cervical region  -     HLA B27 Antigen; Future  -      HYDROcodone-acetaminophen (NORCO) 7.5-325 mg per tablet; Take 1 tablet by mouth every 8 (eight) hours as needed for Pain.  -     etodolac (LODINE) 400 MG tablet; Take 1 tablet (400 mg total) by mouth 2 (two) times daily.  -     tiZANidine (ZANAFLEX) 4 MG tablet; Take 1 tablet (4 mg total) by mouth every 8 (eight) hours. for 10 days        1. Continue mtx  2. Consider otezla  cosentyx or taltz  3. f/u 3 to 4 months     More than 50% of the  60 minute encounter was spent face to face counseling the patient regarding current status and future plan of care as well as side effects  of the medications. All questions were answered to patient's satisfaction also includes  non-face to face time preparing to see the patient (eg, review of tests), Obtaining and/or reviewing separately obtained history, Documenting clinical information in the electronic or other health record, Independently interpreting results

## 2025-01-27 DIAGNOSIS — M54.9 MID BACK PAIN: ICD-10-CM

## 2025-01-27 DIAGNOSIS — G90.50 RSD (REFLEX SYMPATHETIC DYSTROPHY): ICD-10-CM

## 2025-01-27 DIAGNOSIS — M35.00 SJOGREN'S SYNDROME, WITH UNSPECIFIED ORGAN INVOLVEMENT: ICD-10-CM

## 2025-01-27 DIAGNOSIS — M45.A2 NON-RADIOGRAPHIC AXIAL SPONDYLOARTHRITIS OF CERVICAL REGION: ICD-10-CM

## 2025-01-27 DIAGNOSIS — M47.812 SPONDYLOSIS OF CERVICAL REGION WITHOUT MYELOPATHY OR RADICULOPATHY: ICD-10-CM

## 2025-01-29 RX ORDER — ETODOLAC 400 MG/1
400 TABLET, FILM COATED ORAL 2 TIMES DAILY
Qty: 60 TABLET | Refills: 6 | Status: SHIPPED | OUTPATIENT
Start: 2025-01-29

## 2025-01-29 RX ORDER — TIZANIDINE 4 MG/1
4 TABLET ORAL EVERY 8 HOURS
Qty: 30 TABLET | Refills: 0 | Status: SHIPPED | OUTPATIENT
Start: 2025-01-29 | End: 2025-02-08

## 2025-01-29 RX ORDER — HYDROCODONE BITARTRATE AND ACETAMINOPHEN 7.5; 325 MG/1; MG/1
1 TABLET ORAL EVERY 8 HOURS PRN
Qty: 21 TABLET | Refills: 0 | Status: SHIPPED | OUTPATIENT
Start: 2025-01-29 | End: 2025-02-05

## 2025-01-29 RX ORDER — METHOTREXATE 2.5 MG/1
TABLET ORAL
Qty: 64 TABLET | Refills: 0 | Status: SHIPPED | OUTPATIENT
Start: 2025-01-29

## 2025-02-06 DIAGNOSIS — M47.812 SPONDYLOSIS OF CERVICAL REGION WITHOUT MYELOPATHY OR RADICULOPATHY: ICD-10-CM

## 2025-02-06 DIAGNOSIS — G90.50 RSD (REFLEX SYMPATHETIC DYSTROPHY): ICD-10-CM

## 2025-02-06 DIAGNOSIS — M35.00 SJOGREN'S SYNDROME, WITH UNSPECIFIED ORGAN INVOLVEMENT: ICD-10-CM

## 2025-02-06 DIAGNOSIS — M45.A2 NON-RADIOGRAPHIC AXIAL SPONDYLOARTHRITIS OF CERVICAL REGION: ICD-10-CM

## 2025-02-06 DIAGNOSIS — M54.9 MID BACK PAIN: ICD-10-CM

## 2025-02-12 RX ORDER — METHOTREXATE 2.5 MG/1
TABLET ORAL
Qty: 64 TABLET | Refills: 0 | Status: SHIPPED | OUTPATIENT
Start: 2025-02-12

## 2025-02-12 RX ORDER — HYDROCODONE BITARTRATE AND ACETAMINOPHEN 7.5; 325 MG/1; MG/1
1 TABLET ORAL EVERY 8 HOURS PRN
Qty: 90 TABLET | Refills: 0 | Status: SHIPPED | OUTPATIENT
Start: 2025-02-12 | End: 2025-03-14

## 2025-02-12 RX ORDER — TIZANIDINE 4 MG/1
4 TABLET ORAL EVERY 8 HOURS
Qty: 90 TABLET | Refills: 0 | Status: SHIPPED | OUTPATIENT
Start: 2025-02-12

## 2025-02-18 ENCOUNTER — PATIENT MESSAGE (OUTPATIENT)
Dept: CARDIOLOGY | Facility: CLINIC | Age: 60
End: 2025-02-18
Payer: MEDICARE

## 2025-02-24 ENCOUNTER — OFFICE VISIT (OUTPATIENT)
Dept: FAMILY MEDICINE | Facility: CLINIC | Age: 60
End: 2025-02-24
Payer: MEDICARE

## 2025-02-24 VITALS
TEMPERATURE: 98 F | BODY MASS INDEX: 32.01 KG/M2 | DIASTOLIC BLOOD PRESSURE: 84 MMHG | HEART RATE: 110 BPM | WEIGHT: 203.94 LBS | SYSTOLIC BLOOD PRESSURE: 132 MMHG | OXYGEN SATURATION: 96 % | HEIGHT: 67 IN

## 2025-02-24 DIAGNOSIS — Z23 IMMUNIZATION DUE: Primary | ICD-10-CM

## 2025-02-24 PROCEDURE — 99499 UNLISTED E&M SERVICE: CPT | Mod: S$PBB,,, | Performed by: STUDENT IN AN ORGANIZED HEALTH CARE EDUCATION/TRAINING PROGRAM

## 2025-02-24 PROCEDURE — 99213 OFFICE O/P EST LOW 20 MIN: CPT | Mod: PBBFAC,PO | Performed by: STUDENT IN AN ORGANIZED HEALTH CARE EDUCATION/TRAINING PROGRAM

## 2025-02-24 PROCEDURE — 99999PBSHW PR PBB SHADOW TECHNICAL ONLY FILED TO HB: Mod: PBBFAC,,,

## 2025-02-24 PROCEDURE — 99999 PR PBB SHADOW E&M-EST. PATIENT-LVL III: CPT | Mod: PBBFAC,,, | Performed by: STUDENT IN AN ORGANIZED HEALTH CARE EDUCATION/TRAINING PROGRAM

## 2025-02-24 PROCEDURE — 90471 IMMUNIZATION ADMIN: CPT | Mod: PBBFAC,PO

## 2025-02-24 PROCEDURE — 90636 HEP A/HEP B VACC ADULT IM: CPT | Mod: PBBFAC,PO

## 2025-02-24 RX ORDER — TIRZEPATIDE 5 MG/.5ML
5 INJECTION, SOLUTION SUBCUTANEOUS
COMMUNITY
Start: 2025-02-21

## 2025-02-24 RX ORDER — TIRZEPATIDE 2.5 MG/.5ML
2.5 INJECTION, SOLUTION SUBCUTANEOUS
COMMUNITY
Start: 2025-02-03

## 2025-02-24 RX ORDER — ASPIRIN 325 MG
50000 TABLET, DELAYED RELEASE (ENTERIC COATED) ORAL
COMMUNITY
Start: 2025-01-31

## 2025-02-24 RX ADMIN — HEPATITIS B VACCINE (RECOMBINANT) 0.5 ML: 20 INJECTION, SUSPENSION INTRAMUSCULAR at 02:02

## 2025-02-24 NOTE — PROGRESS NOTES
"Problem visit/UC  Chief Complaint   Patient presents with    Immunizations     Hepatitis B vaccine       Subjective   Patient ID: Van Jones is a 59 y.o. male.    HPI  Van Jones is a 59 y.o. who presents with HBV vaccine.   Patient is being seen today for same day sick visit. Patient is new to provider.     The patient reports his rheumatologist recommended obtaining Hep B booster after titer was non-reactive    Review of Systems  Objective      Vitals:    02/24/25 1402   BP: 132/84   Pulse: 110   Temp: 98.4 °F (36.9 °C)   TempSrc: Oral   SpO2: 96%   Weight: 92.5 kg (203 lb 14.8 oz)   Height: 5' 7" (1.702 m)       Physical Exam  Vitals reviewed.   Constitutional:       General: He is not in acute distress.     Appearance: Normal appearance. He is not ill-appearing or toxic-appearing.   HENT:      Head: Normocephalic and atraumatic.   Eyes:      Conjunctiva/sclera: Conjunctivae normal.   Musculoskeletal:      Cervical back: Neck supple.   Neurological:      Mental Status: He is alert.         Procedures         Assessment & Plan   Immunization due  - There is no Heplisav-B in clinic (2 dose series) as a result he was given Engerix B (3 dose series) and therefore will need 2nd dose at 1mo and third at 6mo pedro  -     Discontinue: hepatitis B (HEPLISAV-B) 20 mcg/0.5 mL vaccine 0.5 mL  -     hepatitis B virus (PF) vaccine injection 0.5 mL      Patient given return precautions and instructed to FU with PCP regarding symptoms.  "

## 2025-03-12 DIAGNOSIS — M45.A2 NON-RADIOGRAPHIC AXIAL SPONDYLOARTHRITIS OF CERVICAL REGION: ICD-10-CM

## 2025-03-12 DIAGNOSIS — M47.812 SPONDYLOSIS OF CERVICAL REGION WITHOUT MYELOPATHY OR RADICULOPATHY: ICD-10-CM

## 2025-03-12 DIAGNOSIS — G90.50 RSD (REFLEX SYMPATHETIC DYSTROPHY): ICD-10-CM

## 2025-03-12 DIAGNOSIS — M35.00 SJOGREN'S SYNDROME, WITH UNSPECIFIED ORGAN INVOLVEMENT: ICD-10-CM

## 2025-03-12 DIAGNOSIS — M54.9 MID BACK PAIN: ICD-10-CM

## 2025-03-13 RX ORDER — TIZANIDINE 4 MG/1
4 TABLET ORAL EVERY 8 HOURS
Qty: 90 TABLET | Refills: 0 | Status: SHIPPED | OUTPATIENT
Start: 2025-03-13

## 2025-03-22 ENCOUNTER — PATIENT MESSAGE (OUTPATIENT)
Dept: RHEUMATOLOGY | Facility: CLINIC | Age: 60
End: 2025-03-22
Payer: MEDICARE

## 2025-03-28 ENCOUNTER — PATIENT MESSAGE (OUTPATIENT)
Dept: RHEUMATOLOGY | Facility: CLINIC | Age: 60
End: 2025-03-28
Payer: MEDICARE

## 2025-03-28 DIAGNOSIS — G89.4 CHRONIC PAIN SYNDROME: ICD-10-CM

## 2025-03-28 DIAGNOSIS — M35.00 SJOGREN'S SYNDROME, WITH UNSPECIFIED ORGAN INVOLVEMENT: Primary | ICD-10-CM

## 2025-03-30 RX ORDER — HYDROCODONE BITARTRATE AND ACETAMINOPHEN 7.5; 325 MG/1; MG/1
1 TABLET ORAL EVERY 8 HOURS PRN
Qty: 90 TABLET | Refills: 0 | Status: SHIPPED | OUTPATIENT
Start: 2025-04-28 | End: 2025-05-28

## 2025-03-30 RX ORDER — HYDROCODONE BITARTRATE AND ACETAMINOPHEN 7.5; 325 MG/1; MG/1
1 TABLET ORAL EVERY 6 HOURS PRN
Qty: 90 TABLET | Refills: 0 | OUTPATIENT
Start: 2025-03-30

## 2025-03-30 RX ORDER — HYDROCODONE BITARTRATE AND ACETAMINOPHEN 7.5; 325 MG/1; MG/1
1 TABLET ORAL EVERY 8 HOURS PRN
Qty: 90 TABLET | Refills: 0 | Status: SHIPPED | OUTPATIENT
Start: 2025-03-30 | End: 2025-04-29

## 2025-03-31 DIAGNOSIS — Z23 NEED FOR HEPATITIS B VACCINATION: Primary | ICD-10-CM

## 2025-04-01 DIAGNOSIS — Z23 NEED FOR HEPATITIS B VACCINATION: Primary | ICD-10-CM

## 2025-04-02 ENCOUNTER — CLINICAL SUPPORT (OUTPATIENT)
Dept: FAMILY MEDICINE | Facility: CLINIC | Age: 60
End: 2025-04-02
Payer: MEDICARE

## 2025-04-02 VITALS — RESPIRATION RATE: 18 BRPM

## 2025-04-02 DIAGNOSIS — Z23 NEED FOR HEPATITIS B VACCINATION: Primary | ICD-10-CM

## 2025-04-02 PROCEDURE — 99999PBSHW PR PBB SHADOW TECHNICAL ONLY FILED TO HB: Mod: PBBFAC,,,

## 2025-04-02 PROCEDURE — 90746 HEPB VACCINE 3 DOSE ADULT IM: CPT | Mod: PBBFAC,PO

## 2025-04-02 PROCEDURE — 99999 PR PBB SHADOW E&M-EST. PATIENT-LVL I: CPT | Mod: PBBFAC,,,

## 2025-04-02 PROCEDURE — 99211 OFF/OP EST MAY X REQ PHY/QHP: CPT | Mod: PBBFAC,PO

## 2025-04-02 PROCEDURE — G0010 ADMIN HEPATITIS B VACCINE: HCPCS | Mod: PBBFAC,PO

## 2025-04-02 RX ADMIN — HEPATITIS B VACCINE (RECOMBINANT) 20 MCG: 20 INJECTION, SUSPENSION INTRAMUSCULAR at 10:04

## 2025-04-02 NOTE — PROGRESS NOTES
After obtaining consent, and per orders of Dr. Maguire, injection of hepatitis B vaccine given by Ang Jules. Patient instructed to remain in clinic for 20 minutes afterwards, and to report any adverse reaction to me immediately.  PtJerel Tomas.

## 2025-04-08 ENCOUNTER — LAB VISIT (OUTPATIENT)
Dept: LAB | Facility: HOSPITAL | Age: 60
End: 2025-04-08
Payer: MEDICARE

## 2025-04-08 DIAGNOSIS — E61.2 MAGNESIUM DEFICIENCY: ICD-10-CM

## 2025-04-08 DIAGNOSIS — E66.811 OBESITY, CLASS I, BMI 30-34.9: ICD-10-CM

## 2025-04-08 DIAGNOSIS — G47.00 INSOMNIA, UNSPECIFIED TYPE: Primary | ICD-10-CM

## 2025-04-08 DIAGNOSIS — E11.8 TYPE II DIABETES MELLITUS WITH COMPLICATION: ICD-10-CM

## 2025-04-08 DIAGNOSIS — E78.5 HYPERLIPIDEMIA, UNSPECIFIED HYPERLIPIDEMIA TYPE: ICD-10-CM

## 2025-04-08 PROCEDURE — 83735 ASSAY OF MAGNESIUM: CPT

## 2025-04-08 PROCEDURE — 36415 COLL VENOUS BLD VENIPUNCTURE: CPT | Mod: PO

## 2025-04-10 ENCOUNTER — LAB VISIT (OUTPATIENT)
Dept: LAB | Facility: HOSPITAL | Age: 60
End: 2025-04-10
Attending: STUDENT IN AN ORGANIZED HEALTH CARE EDUCATION/TRAINING PROGRAM
Payer: MEDICARE

## 2025-04-10 DIAGNOSIS — E55.9 AVITAMINOSIS D: ICD-10-CM

## 2025-04-10 DIAGNOSIS — E61.2 MAGNESIUM DEFICIENCY: ICD-10-CM

## 2025-04-10 DIAGNOSIS — R79.9 ABNORMAL BLOOD CHEMISTRY: ICD-10-CM

## 2025-04-10 DIAGNOSIS — E11.8 DIABETIC COMPLICATION: ICD-10-CM

## 2025-04-10 DIAGNOSIS — G47.00 INSOMNIA, UNSPECIFIED TYPE: Primary | ICD-10-CM

## 2025-04-10 DIAGNOSIS — R79.82 ELEVATED C-REACTIVE PROTEIN (CRP): ICD-10-CM

## 2025-04-10 DIAGNOSIS — G90.50 RSD (REFLEX SYMPATHETIC DYSTROPHY): ICD-10-CM

## 2025-04-10 DIAGNOSIS — M35.00 SJOGREN SYNDROME: ICD-10-CM

## 2025-04-10 DIAGNOSIS — E66.811 OBESITY, CLASS I, BMI 30-34.9: ICD-10-CM

## 2025-04-10 LAB
25(OH)D3+25(OH)D2 SERPL-MCNC: 68 NG/ML (ref 30–96)
CRP SERPL-MCNC: 0.8 MG/L

## 2025-04-10 PROCEDURE — 36415 COLL VENOUS BLD VENIPUNCTURE: CPT | Mod: PO

## 2025-04-10 PROCEDURE — 86140 C-REACTIVE PROTEIN: CPT

## 2025-04-10 PROCEDURE — 82306 VITAMIN D 25 HYDROXY: CPT

## 2025-04-13 DIAGNOSIS — M47.812 SPONDYLOSIS OF CERVICAL REGION WITHOUT MYELOPATHY OR RADICULOPATHY: ICD-10-CM

## 2025-04-13 DIAGNOSIS — G90.50 RSD (REFLEX SYMPATHETIC DYSTROPHY): ICD-10-CM

## 2025-04-13 DIAGNOSIS — M54.9 MID BACK PAIN: ICD-10-CM

## 2025-04-13 DIAGNOSIS — M35.00 SJOGREN'S SYNDROME, WITH UNSPECIFIED ORGAN INVOLVEMENT: ICD-10-CM

## 2025-04-13 DIAGNOSIS — M45.A2 NON-RADIOGRAPHIC AXIAL SPONDYLOARTHRITIS OF CERVICAL REGION: ICD-10-CM

## 2025-04-16 DIAGNOSIS — M45.A2 NON-RADIOGRAPHIC AXIAL SPONDYLOARTHRITIS OF CERVICAL REGION: ICD-10-CM

## 2025-04-16 DIAGNOSIS — M35.00 SJOGREN'S SYNDROME, WITH UNSPECIFIED ORGAN INVOLVEMENT: ICD-10-CM

## 2025-04-16 DIAGNOSIS — M54.9 MID BACK PAIN: ICD-10-CM

## 2025-04-16 DIAGNOSIS — G90.50 RSD (REFLEX SYMPATHETIC DYSTROPHY): ICD-10-CM

## 2025-04-16 DIAGNOSIS — M47.812 SPONDYLOSIS OF CERVICAL REGION WITHOUT MYELOPATHY OR RADICULOPATHY: ICD-10-CM

## 2025-04-16 LAB — W MAGNESIUM RBC: 5 MG/DL

## 2025-04-17 RX ORDER — TIZANIDINE 4 MG/1
4 TABLET ORAL EVERY 8 HOURS
Qty: 90 TABLET | Refills: 0 | Status: SHIPPED | OUTPATIENT
Start: 2025-04-17

## 2025-04-18 ENCOUNTER — PATIENT MESSAGE (OUTPATIENT)
Dept: RHEUMATOLOGY | Facility: CLINIC | Age: 60
End: 2025-04-18
Payer: MEDICARE

## 2025-04-18 RX ORDER — TIZANIDINE 4 MG/1
4 TABLET ORAL EVERY 8 HOURS
Qty: 90 TABLET | Refills: 0 | Status: SHIPPED | OUTPATIENT
Start: 2025-04-18

## 2025-05-09 ENCOUNTER — TELEPHONE (OUTPATIENT)
Dept: PODIATRY | Facility: CLINIC | Age: 60
End: 2025-05-09
Payer: MEDICARE

## 2025-05-21 ENCOUNTER — OFFICE VISIT (OUTPATIENT)
Dept: PODIATRY | Facility: CLINIC | Age: 60
End: 2025-05-21
Payer: MEDICARE

## 2025-05-21 VITALS
BODY MASS INDEX: 30.29 KG/M2 | DIASTOLIC BLOOD PRESSURE: 85 MMHG | HEART RATE: 90 BPM | SYSTOLIC BLOOD PRESSURE: 119 MMHG | HEIGHT: 67 IN | WEIGHT: 193 LBS

## 2025-05-21 DIAGNOSIS — G57.82 ENTRAPMENT OF LEFT ILIOINGUINAL NERVE: Primary | ICD-10-CM

## 2025-05-21 DIAGNOSIS — M79.672 LEFT FOOT PAIN: ICD-10-CM

## 2025-05-21 DIAGNOSIS — Z87.81 HISTORY OF FEMUR FRACTURE: ICD-10-CM

## 2025-05-21 PROCEDURE — 99213 OFFICE O/P EST LOW 20 MIN: CPT | Mod: PBBFAC | Performed by: PODIATRIST

## 2025-05-21 PROCEDURE — 99204 OFFICE O/P NEW MOD 45 MIN: CPT | Mod: S$PBB,,, | Performed by: PODIATRIST

## 2025-05-21 PROCEDURE — 99999 PR PBB SHADOW E&M-EST. PATIENT-LVL III: CPT | Mod: PBBFAC,,, | Performed by: PODIATRIST

## 2025-05-21 RX ORDER — AMITRIPTYLINE HYDROCHLORIDE 10 MG/1
10 TABLET, FILM COATED ORAL NIGHTLY PRN
Qty: 30 TABLET | Refills: 2 | Status: SHIPPED | OUTPATIENT
Start: 2025-05-21 | End: 2026-05-21

## 2025-05-21 NOTE — PROGRESS NOTES
Subjective:      Patient ID: Van Jones is a 59 y.o. male.    Chief Complaint: Foot Pain (Left foot nerve damage is very unbearable and hard to walk.)    Van is a 59 y.o. male who presents to the clinic complaining of thick and discolored toenails on both feet. Van is inquiring about treatment options.  He has not been able to follow-up in some time however previously he did very well with the nerve injections for nerve blocks with relief approximately 3 to have days.  He is here to discuss possible surgical intervention as discussed before.  History of nerve damage left lower extremity status post femoral fracture in the 1980s.      Review of Systems   Constitutional: Negative for chills and fever.   HENT:  Negative for congestion and tinnitus.    Eyes:  Negative for double vision and visual disturbance.   Cardiovascular:  Negative for chest pain and claudication.   Respiratory:  Negative for hemoptysis and shortness of breath.    Endocrine: Negative for cold intolerance and heat intolerance.   Hematologic/Lymphatic: Negative for adenopathy and bleeding problem.   Skin:  Positive for color change and nail changes.   Musculoskeletal:  Negative for myalgias and stiffness.   Gastrointestinal:  Negative for nausea and vomiting.   Genitourinary:  Negative for dysuria and hematuria.   Neurological:  Negative for numbness and paresthesias.   Psychiatric/Behavioral:  Negative for altered mental status and suicidal ideas.    Allergic/Immunologic: Negative for environmental allergies and persistent infections.           Objective:      Physical Exam  Constitutional:       Appearance: He is well-developed.   Cardiovascular:      Pulses:           Dorsalis pedis pulses are 2+ on the right side and 2+ on the left side.        Posterior tibial pulses are 2+ on the right side and 2+ on the left side.   Musculoskeletal:      Right foot: Normal range of motion. No deformity.      Left foot: Normal range of motion. No  deformity.      Comments: Inspection and palpation of the muscles joints and bones of both lower extremities reveal that muscle strength for the anterior, lateral, and posterior muscle groups and intrinsic muscle groups of the foot are all 5 over 5 symmetrical. Ankle, subtalar, midtarsal, and digital joint range of motion  are within normal limits, nonpainful, without crepitus or effusion. Patient exhibits a normal angle and base of gait. Palpation of the tendons reveal no defects.     Feet:      Right foot:      Skin integrity: No skin breakdown or erythema.      Left foot:      Skin integrity: No skin breakdown or erythema.   Skin:     General: Skin is warm and dry.      Nails: There is no clubbing.      Comments: Skin turgor is normal bilaterally. Skin texture is well hydrated to both lower extremities. No lesions or rashes or wounds appreciated bilaterally. The nail plates 1 through 5 bilaterally specifically the bilateral hallux nails are noted to be thickened discolored with mild incurvation and tenderness noted.   Neurological:      Mental Status: He is oriented to person, place, and time.      Sensory: No sensory deficit.      Deep Tendon Reflexes:      Reflex Scores:       Patellar reflexes are 2+ on the right side and 2+ on the left side.       Achilles reflexes are 2+ on the right side and 2+ on the left side.     Comments: Sharp, dull, light touch, vibratory, and proprioceptive sensation are intact bilaterally. Deep tendon reflexes to patellar and Achilles tendon are symmetrical, 2/4 bilaterally. No ankle clonus or Babinski reflexes noted bilaterally. Coordination is normal to both feet and lower extremities.    Positive provocation sign/Tinel sign noted to the left common peroneal as well as the superficial and deep peroneal nerves.               Assessment:       Encounter Diagnoses   Name Primary?    Entrapment of left ilioinguinal nerve Yes    Left foot pain     History of femur fracture - Left Foot             Plan:       Van was seen today for foot pain.    Diagnoses and all orders for this visit:    Entrapment of left ilioinguinal nerve    Left foot pain    History of femur fracture - Left Foot        I counseled the patient on his conditions, their implications and medical management.    Ongoing monitoring, evaluating, assessing, and treatment options are recognized and reviewed in detail with the patient.  Signs, symptoms and disease/condition progression consist of weakness, burning tingling and hypersensitivity to the left lower extremity as well as footdrop..  Evaluation of patient's condition consist of above mentioned test results, effectiveness of medications and other treatments administered and level of response to treatment by the patient has been very well tolerated with good results..  Assessing and addressing patient's condition include above-mentioned ancillary testing, discussion of information in detail with patient, record review and counseling regarding the patient's pathology.  Ongoing treatment of the patient's condition have included various medications, therapies and other modalities consisting of the use of a crutch, shoe gear and over-the-counter inserts.      The patient has decided to forego any further conservative treatment and opted for surgical intervention.  Alternative treatments, and benefits of surgery were discussed with the patient.  Risks and complications, including but not limited to: pain, swelling, numbness, infection, failure to achieve the stated goals, recurrence of problem, nerve and blood vessel damage, scar tissue formation, further need of surgery, loss of limb or life, was discussed in detail with the patient.  All questions asked were answered, and written informed consent was sign and received. The patient will undergo nerve decompression x3 left lower extremity including the deep/superficial and common peroneal nerves.

## 2025-05-27 ENCOUNTER — TELEPHONE (OUTPATIENT)
Dept: PODIATRY | Facility: CLINIC | Age: 60
End: 2025-05-27
Payer: MEDICARE

## 2025-05-28 ENCOUNTER — TELEPHONE (OUTPATIENT)
Dept: PODIATRY | Facility: CLINIC | Age: 60
End: 2025-05-28
Payer: MEDICARE

## 2025-05-28 ENCOUNTER — PATIENT MESSAGE (OUTPATIENT)
Dept: PODIATRY | Facility: CLINIC | Age: 60
End: 2025-05-28
Payer: MEDICARE

## 2025-05-28 NOTE — TELEPHONE ENCOUNTER
Spoke to pt and discussed 6/13 surgery date. Also advised pt to contact their PCP to schedule an appointment to be cleared for surgery. Then advised pt that someone will call them the day before surgery with the surgery arrival time and instructions. Pt verbalized understanding and call ended.

## 2025-06-02 ENCOUNTER — PATIENT MESSAGE (OUTPATIENT)
Dept: PODIATRY | Facility: CLINIC | Age: 60
End: 2025-06-02
Payer: MEDICARE

## 2025-06-06 ENCOUNTER — TELEPHONE (OUTPATIENT)
Dept: RHEUMATOLOGY | Facility: CLINIC | Age: 60
End: 2025-06-06
Payer: MEDICARE

## 2025-06-09 DIAGNOSIS — G89.4 CHRONIC PAIN SYNDROME: ICD-10-CM

## 2025-06-09 DIAGNOSIS — M47.812 SPONDYLOSIS OF CERVICAL REGION WITHOUT MYELOPATHY OR RADICULOPATHY: ICD-10-CM

## 2025-06-09 DIAGNOSIS — M54.9 MID BACK PAIN: ICD-10-CM

## 2025-06-09 DIAGNOSIS — M35.00 SJOGREN'S SYNDROME, WITH UNSPECIFIED ORGAN INVOLVEMENT: Primary | ICD-10-CM

## 2025-06-09 DIAGNOSIS — M45.A2 NON-RADIOGRAPHIC AXIAL SPONDYLOARTHRITIS OF CERVICAL REGION: ICD-10-CM

## 2025-06-10 ENCOUNTER — LAB VISIT (OUTPATIENT)
Dept: LAB | Facility: HOSPITAL | Age: 60
End: 2025-06-10
Attending: INTERNAL MEDICINE
Payer: MEDICARE

## 2025-06-10 DIAGNOSIS — G89.4 CHRONIC PAIN SYNDROME: ICD-10-CM

## 2025-06-10 DIAGNOSIS — M45.A2 NON-RADIOGRAPHIC AXIAL SPONDYLOARTHRITIS OF CERVICAL REGION: ICD-10-CM

## 2025-06-10 DIAGNOSIS — M35.00 SJOGREN'S SYNDROME, WITH UNSPECIFIED ORGAN INVOLVEMENT: ICD-10-CM

## 2025-06-10 DIAGNOSIS — M54.9 MID BACK PAIN: ICD-10-CM

## 2025-06-10 DIAGNOSIS — M47.812 SPONDYLOSIS OF CERVICAL REGION WITHOUT MYELOPATHY OR RADICULOPATHY: ICD-10-CM

## 2025-06-10 LAB
ABSOLUTE EOSINOPHIL (OHS): 0.17 K/UL
ABSOLUTE MONOCYTE (OHS): 0.99 K/UL (ref 0.3–1)
ABSOLUTE NEUTROPHIL COUNT (OHS): 7.21 K/UL (ref 1.8–7.7)
ALBUMIN SERPL BCP-MCNC: 4.5 G/DL (ref 3.5–5.2)
ALP SERPL-CCNC: 44 UNIT/L (ref 40–150)
ALT SERPL W/O P-5'-P-CCNC: 37 UNIT/L (ref 10–44)
ANION GAP (OHS): 14 MMOL/L (ref 8–16)
AST SERPL-CCNC: 26 UNIT/L (ref 11–45)
BASOPHILS # BLD AUTO: 0.06 K/UL
BASOPHILS NFR BLD AUTO: 0.6 %
BILIRUB SERPL-MCNC: 0.6 MG/DL (ref 0.1–1)
BUN SERPL-MCNC: 21 MG/DL (ref 6–20)
CALCIUM SERPL-MCNC: 9.3 MG/DL (ref 8.7–10.5)
CHLORIDE SERPL-SCNC: 107 MMOL/L (ref 95–110)
CO2 SERPL-SCNC: 21 MMOL/L (ref 23–29)
CREAT SERPL-MCNC: 1.3 MG/DL (ref 0.5–1.4)
CRP SERPL-MCNC: 2.4 MG/L
CYCLIC CITRULLINATED PEPTIDE (CCP) (OHS): 1.7 U/ML
ERYTHROCYTE [DISTWIDTH] IN BLOOD BY AUTOMATED COUNT: 13 % (ref 11.5–14.5)
ERYTHROCYTE [SEDIMENTATION RATE] IN BLOOD BY PHOTOMETRIC METHOD: 2 MM/HR
GFR SERPLBLD CREATININE-BSD FMLA CKD-EPI: >60 ML/MIN/1.73/M2
GLUCOSE SERPL-MCNC: 73 MG/DL (ref 70–110)
HCT VFR BLD AUTO: 44.8 % (ref 40–54)
HGB BLD-MCNC: 14.4 GM/DL (ref 14–18)
IMM GRANULOCYTES # BLD AUTO: 0.02 K/UL (ref 0–0.04)
IMM GRANULOCYTES NFR BLD AUTO: 0.2 % (ref 0–0.5)
LYMPHOCYTES # BLD AUTO: 1.78 K/UL (ref 1–4.8)
MCH RBC QN AUTO: 29.1 PG (ref 27–31)
MCHC RBC AUTO-ENTMCNC: 32.1 G/DL (ref 32–36)
MCV RBC AUTO: 91 FL (ref 82–98)
NUCLEATED RBC (/100WBC) (OHS): 0 /100 WBC
PLATELET # BLD AUTO: 202 K/UL (ref 150–450)
PMV BLD AUTO: 10.7 FL (ref 9.2–12.9)
POTASSIUM SERPL-SCNC: 4.3 MMOL/L (ref 3.5–5.1)
PROT SERPL-MCNC: 7.1 GM/DL (ref 6–8.4)
RBC # BLD AUTO: 4.95 M/UL (ref 4.6–6.2)
RELATIVE EOSINOPHIL (OHS): 1.7 %
RELATIVE LYMPHOCYTE (OHS): 17.4 % (ref 18–48)
RELATIVE MONOCYTE (OHS): 9.7 % (ref 4–15)
RELATIVE NEUTROPHIL (OHS): 70.4 % (ref 38–73)
RHEUMATOID FACT SERPL-ACNC: <13 IU/ML
SODIUM SERPL-SCNC: 142 MMOL/L (ref 136–145)
T4 FREE SERPL-MCNC: 1.16 NG/DL (ref 0.71–1.51)
TSH SERPL-ACNC: 1.39 UIU/ML (ref 0.4–4)
WBC # BLD AUTO: 10.23 K/UL (ref 3.9–12.7)

## 2025-06-10 PROCEDURE — 36415 COLL VENOUS BLD VENIPUNCTURE: CPT | Mod: PO

## 2025-06-10 PROCEDURE — 86140 C-REACTIVE PROTEIN: CPT

## 2025-06-10 PROCEDURE — 84450 TRANSFERASE (AST) (SGOT): CPT

## 2025-06-10 PROCEDURE — 86200 CCP ANTIBODY: CPT

## 2025-06-10 PROCEDURE — 84443 ASSAY THYROID STIM HORMONE: CPT

## 2025-06-10 PROCEDURE — 85025 COMPLETE CBC W/AUTO DIFF WBC: CPT

## 2025-06-10 PROCEDURE — 85652 RBC SED RATE AUTOMATED: CPT

## 2025-06-10 PROCEDURE — 86431 RHEUMATOID FACTOR QUANT: CPT

## 2025-06-10 PROCEDURE — 84439 ASSAY OF FREE THYROXINE: CPT

## 2025-06-12 ENCOUNTER — TELEPHONE (OUTPATIENT)
Dept: PODIATRY | Facility: CLINIC | Age: 60
End: 2025-06-12
Payer: MEDICARE

## 2025-06-12 NOTE — TELEPHONE ENCOUNTER
Spoke to pt and relayed their 5:15 am  arrival time for [facility] . Also informed pt to not eat or drink after midnight including gum, hard candy or mints. Also that the pt must have a ride home from surgery, they will not be allowed to drive after anesthesia. Post/op visit 6/24 8:30 [facility] Pt verbalized understanding and call ended.

## 2025-06-12 NOTE — PRE-PROCEDURE INSTRUCTIONS
Unable to reach pt via phone.  Left voicemail with arrival time also informing pt of need for responsible  accompaniment and instructing pt to follow pre-procedure instructions provided via [facility] portal.  The following message was sent to pt's portal.      Dear Van,     You are scheduled for a procedure with Dr. Flor on 6/13/2025. Your scheduled arrival time is 5:15 am.  This arrival time is roughly 1.5-2 hours before your anticipated procedure time to allow sufficient time for pre-op.  Please wear comfortable clothes.  This procedure will take place at the [facility] at the corner of [facility].  [facility] [place] [facility].  The address is:     [address] [place] [address].  [place] [id number]     After entering the building, you will proceed to the second floor where you can check in with registration. You should take any medications that you routinely take for blood pressure (other than those listed below), heart medications, thyroid, cholesterol, etc.      If you wear contact lenses, please wear glasses to your procedure.     Your fasting instructions are as follow:  Nothing to eat after midnight the evening before your surgery. Please STOP drinking clear liquids 2 hours prior to your arrival time. Clear liquids includes water, clear juices, Gatorade, black coffee (no milk/no cream), or soda.  Clear liquids do NOT include anything with pulp or food particles (chicken broth, ice cream, yogurt, Jello, etc.).  You MUST have a responsible  to bring you home.     Medications:  The evening before and morning of your procedure, please hold the following medications:  -Aspirin and Aspirin-containing products (Goody's powder, Excedrin)  -NSAIDs (Advil, Ibuprofen, Aleve, Diclofenac)  -Vitamins/Supplements  -Herbal remedies/Teas  -Stimulants (Adderall, Vyvanse, Adipex)  -Diabetic medication (Please bring with you day of  procedure)  -Prescription creams/gels/lotions  -May take Tylenol if needed     Bathing:  The evening before and morning of your procedure, take a shower using antibacterial soap (ex: Hibiclens or Dial antibacterial soap). DO NOT apply deodorant, lotion, cologne, or anything else to the skin. Wear loose, comfortable fitting clothing. Do not wear jewelry or bring any valuables with you. If you wear dentures or contacts, please bring your case with you or leave them at home. Use and bring any inhalers that you may have.     If you have any procedure-specific questions, please call your surgeon's office. Any other questions, don't hesitate to call me at [phone].     ON THE MORNING OF SURGERY, if you experience any issues, please call our Pre-op Desk a call at [phone].     Please reply to this portal message as receipt of delivery.     Thanks,  JACKSON Messina  Anesthesia Dept   Pre-Admit Testing Team  [facility]

## 2025-06-13 ENCOUNTER — ANESTHESIA (OUTPATIENT)
Dept: SURGERY | Facility: HOSPITAL | Age: 60
End: 2025-06-13
Payer: MEDICARE

## 2025-06-13 ENCOUNTER — ANESTHESIA EVENT (OUTPATIENT)
Dept: SURGERY | Facility: HOSPITAL | Age: 60
End: 2025-06-13
Payer: MEDICARE

## 2025-06-13 ENCOUNTER — HOSPITAL ENCOUNTER (OUTPATIENT)
Facility: HOSPITAL | Age: 60
Discharge: HOME | End: 2025-06-13
Attending: PODIATRIST | Admitting: PODIATRIST
Payer: MEDICARE

## 2025-06-13 VITALS
DIASTOLIC BLOOD PRESSURE: 58 MMHG | TEMPERATURE: 98 F | SYSTOLIC BLOOD PRESSURE: 100 MMHG | RESPIRATION RATE: 20 BRPM | OXYGEN SATURATION: 94 % | HEART RATE: 83 BPM

## 2025-06-13 DIAGNOSIS — Z98.890 POST-OPERATIVE STATE: Primary | ICD-10-CM

## 2025-06-13 DIAGNOSIS — G57.92 NEURITIS OF FOOT, LEFT: ICD-10-CM

## 2025-06-13 LAB — POCT GLUCOSE: 105 MG/DL (ref 70–110)

## 2025-06-13 PROCEDURE — 64708 REVISE ARM/LEG NERVE: CPT | Mod: 51,LT,GC, | Performed by: PODIATRIST

## 2025-06-13 PROCEDURE — 63600175 PHARM REV CODE 636 W HCPCS: Performed by: PODIATRIST

## 2025-06-13 PROCEDURE — 37000008 HC ANESTHESIA 1ST 15 MINUTES: Performed by: PODIATRIST

## 2025-06-13 PROCEDURE — 99900035 HC TECH TIME PER 15 MIN (STAT)

## 2025-06-13 PROCEDURE — 71000033 HC RECOVERY, INTIAL HOUR: Performed by: PODIATRIST

## 2025-06-13 PROCEDURE — 25000003 PHARM REV CODE 250: Performed by: PODIATRIST

## 2025-06-13 PROCEDURE — 15275 SKIN SUB GRAFT FACE/NK/HF/G: CPT | Mod: 51,LT,GC, | Performed by: PODIATRIST

## 2025-06-13 PROCEDURE — 71000015 HC POSTOP RECOV 1ST HR: Performed by: PODIATRIST

## 2025-06-13 PROCEDURE — 82962 GLUCOSE BLOOD TEST: CPT | Performed by: PODIATRIST

## 2025-06-13 PROCEDURE — 27200651 HC AIRWAY, LMA: Performed by: NURSE ANESTHETIST, CERTIFIED REGISTERED

## 2025-06-13 PROCEDURE — 25000003 PHARM REV CODE 250: Performed by: NURSE ANESTHETIST, CERTIFIED REGISTERED

## 2025-06-13 PROCEDURE — 63600175 PHARM REV CODE 636 W HCPCS: Performed by: NURSE ANESTHETIST, CERTIFIED REGISTERED

## 2025-06-13 PROCEDURE — 37000009 HC ANESTHESIA EA ADD 15 MINS: Performed by: PODIATRIST

## 2025-06-13 PROCEDURE — 36000707: Performed by: PODIATRIST

## 2025-06-13 PROCEDURE — 27892 DECOMPRESSION OF LEG: CPT | Mod: LT,GC,, | Performed by: PODIATRIST

## 2025-06-13 PROCEDURE — C1889 IMPLANT/INSERT DEVICE, NOC: HCPCS | Performed by: PODIATRIST

## 2025-06-13 PROCEDURE — 28234 INCISION OF FOOT TENDON: CPT | Mod: 51,LT,GC, | Performed by: PODIATRIST

## 2025-06-13 PROCEDURE — 94761 N-INVAS EAR/PLS OXIMETRY MLT: CPT

## 2025-06-13 PROCEDURE — 36000706: Performed by: PODIATRIST

## 2025-06-13 DEVICE — TISSUE HUMAN AMNIO 80MMX40MM: Type: IMPLANTABLE DEVICE | Site: LEG | Status: FUNCTIONAL

## 2025-06-13 RX ORDER — FENTANYL CITRATE 50 UG/ML
INJECTION, SOLUTION INTRAMUSCULAR; INTRAVENOUS
Status: DISCONTINUED | OUTPATIENT
Start: 2025-06-13 | End: 2025-06-13

## 2025-06-13 RX ORDER — SODIUM CHLORIDE 9 MG/ML
INJECTION, SOLUTION INTRAVENOUS CONTINUOUS
Status: DISCONTINUED | OUTPATIENT
Start: 2025-06-13 | End: 2025-06-13 | Stop reason: HOSPADM

## 2025-06-13 RX ORDER — ACETAMINOPHEN 10 MG/ML
INJECTION, SOLUTION INTRAVENOUS
Status: DISCONTINUED | OUTPATIENT
Start: 2025-06-13 | End: 2025-06-13

## 2025-06-13 RX ORDER — OXYCODONE AND ACETAMINOPHEN 5; 325 MG/1; MG/1
2 TABLET ORAL EVERY 6 HOURS PRN
Qty: 28 TABLET | Refills: 0 | Status: SHIPPED | OUTPATIENT
Start: 2025-06-13 | End: 2025-06-16

## 2025-06-13 RX ORDER — HYDROMORPHONE HYDROCHLORIDE 1 MG/ML
0.5 INJECTION, SOLUTION INTRAMUSCULAR; INTRAVENOUS; SUBCUTANEOUS EVERY 5 MIN PRN
Status: DISCONTINUED | OUTPATIENT
Start: 2025-06-13 | End: 2025-06-13 | Stop reason: HOSPADM

## 2025-06-13 RX ORDER — PROPOFOL 10 MG/ML
VIAL (ML) INTRAVENOUS
Status: DISCONTINUED | OUTPATIENT
Start: 2025-06-13 | End: 2025-06-13

## 2025-06-13 RX ORDER — LIDOCAINE HYDROCHLORIDE 10 MG/ML
INJECTION, SOLUTION INFILTRATION; PERINEURAL
Status: DISCONTINUED | OUTPATIENT
Start: 2025-06-13 | End: 2025-06-13 | Stop reason: HOSPADM

## 2025-06-13 RX ORDER — MIDAZOLAM HYDROCHLORIDE 1 MG/ML
INJECTION INTRAMUSCULAR; INTRAVENOUS
Status: DISCONTINUED | OUTPATIENT
Start: 2025-06-13 | End: 2025-06-13

## 2025-06-13 RX ORDER — LIDOCAINE HYDROCHLORIDE 20 MG/ML
INJECTION INTRAVENOUS
Status: DISCONTINUED | OUTPATIENT
Start: 2025-06-13 | End: 2025-06-13

## 2025-06-13 RX ORDER — ONDANSETRON HYDROCHLORIDE 2 MG/ML
4 INJECTION, SOLUTION INTRAVENOUS DAILY PRN
Status: DISCONTINUED | OUTPATIENT
Start: 2025-06-13 | End: 2025-06-13 | Stop reason: HOSPADM

## 2025-06-13 RX ORDER — OXYCODONE HYDROCHLORIDE 5 MG/1
5 TABLET ORAL
Status: DISCONTINUED | OUTPATIENT
Start: 2025-06-13 | End: 2025-06-13 | Stop reason: HOSPADM

## 2025-06-13 RX ORDER — HYDROMORPHONE HYDROCHLORIDE 1 MG/ML
0.4 INJECTION, SOLUTION INTRAMUSCULAR; INTRAVENOUS; SUBCUTANEOUS EVERY 5 MIN PRN
Status: DISCONTINUED | OUTPATIENT
Start: 2025-06-13 | End: 2025-06-13 | Stop reason: HOSPADM

## 2025-06-13 RX ORDER — GLUCAGON 1 MG
1 KIT INJECTION
Status: DISCONTINUED | OUTPATIENT
Start: 2025-06-13 | End: 2025-06-13 | Stop reason: HOSPADM

## 2025-06-13 RX ORDER — ONDANSETRON HYDROCHLORIDE 2 MG/ML
INJECTION, SOLUTION INTRAVENOUS
Status: DISCONTINUED | OUTPATIENT
Start: 2025-06-13 | End: 2025-06-13

## 2025-06-13 RX ORDER — BUPIVACAINE HYDROCHLORIDE 2.5 MG/ML
INJECTION, SOLUTION EPIDURAL; INFILTRATION; INTRACAUDAL; PERINEURAL
Status: DISCONTINUED | OUTPATIENT
Start: 2025-06-13 | End: 2025-06-13 | Stop reason: HOSPADM

## 2025-06-13 RX ORDER — DEXMEDETOMIDINE HYDROCHLORIDE 100 UG/ML
INJECTION, SOLUTION INTRAVENOUS
Status: DISCONTINUED | OUTPATIENT
Start: 2025-06-13 | End: 2025-06-13

## 2025-06-13 RX ORDER — DEXAMETHASONE SODIUM PHOSPHATE 4 MG/ML
INJECTION, SOLUTION INTRA-ARTICULAR; INTRALESIONAL; INTRAMUSCULAR; INTRAVENOUS; SOFT TISSUE
Status: DISCONTINUED | OUTPATIENT
Start: 2025-06-13 | End: 2025-06-13

## 2025-06-13 RX ADMIN — FENTANYL CITRATE 25 MCG: 50 INJECTION, SOLUTION INTRAMUSCULAR; INTRAVENOUS at 07:06

## 2025-06-13 RX ADMIN — LIDOCAINE HYDROCHLORIDE 100 MG: 20 INJECTION INTRAVENOUS at 07:06

## 2025-06-13 RX ADMIN — MIDAZOLAM HYDROCHLORIDE 2 MG: 2 INJECTION, SOLUTION INTRAMUSCULAR; INTRAVENOUS at 07:06

## 2025-06-13 RX ADMIN — PROPOFOL 200 MG: 10 INJECTION, EMULSION INTRAVENOUS at 07:06

## 2025-06-13 RX ADMIN — GLYCOPYRROLATE 0.2 MG: 0.2 INJECTION, SOLUTION INTRAMUSCULAR; INTRAVENOUS at 07:06

## 2025-06-13 RX ADMIN — DEXAMETHASONE SODIUM PHOSPHATE 8 MG: 4 INJECTION INTRA-ARTICULAR; INTRALESIONAL; INTRAMUSCULAR; INTRAVENOUS; SOFT TISSUE at 07:06

## 2025-06-13 RX ADMIN — SODIUM CHLORIDE: 0.9 INJECTION, SOLUTION INTRAVENOUS at 07:06

## 2025-06-13 RX ADMIN — ACETAMINOPHEN 1000 MG: 10 INJECTION INTRAVENOUS at 07:06

## 2025-06-13 RX ADMIN — ONDANSETRON 4 MG: 2 INJECTION INTRAMUSCULAR; INTRAVENOUS at 08:06

## 2025-06-13 RX ADMIN — DEXMEDETOMIDINE HYDROCHLORIDE 12 MCG: 100 INJECTION, SOLUTION INTRAVENOUS at 08:06

## 2025-06-13 NOTE — OP NOTE
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 No acute intra-op findings. 8.0 x 4.0 cm human tissue amnion graft split lengthwise and applied to incisions of CFN and SFN.     Description of Technical Procedures:     Patient was wheeled from pre-op to OR and transferred from stretcher to OR table in supine position. General anesthesia was induced. Tourniquet was affixed to patient's left thigh. Patient's left lower extremity was sterilely prepped to the level of the thigh tourniquet via chlorahexidine gluconate prep. The affected limb was then draped in the usual sterile fashion. A timeout was called, and the correct patient, procedure, and laterality was confirmed. Tourniquet inflated to the level of 300 mmHg.     Attention was directed to the left superior lateral leg to perform a common peroneal nerve decompression.  A skin marker was utilized to demarcate an incision line longitudinally, proximally 7 cm in length, extending from the posterior aspect of the neck of the fibula inferiorly.  Following this, a 15 blade was utilized to  create a controlled depth incision along the previously demarcated incision line, incision extended to the subcutaneous layers.  Tenotomy scissors was utilized for soft tissue dissection.  Dissection was carried forth until the deep fascia was encountered.  The deep fascia was incised for decompression, and further blunt dissection revealed the common peroneal nerve.  The common peroneal nerve was tracked distally and  released from any scar tissue.  The fascia of the peroneus longus and brevis was released, and tight deep fascia underneath the undersurface of the purulent in his longus muscle was released.  Manual exploration along the interval revealed no compression along the course of the common peroneal nerve.    Attention was then directed to the left lateral mid leg to perform a superficial peroneal nerve release.  In a similar manner to the common peroneal nerve release, skin marker, 15 blade was utilized to create a controlled depth incision, extending approximately 5.0 cm, incision carried down to the subcutaneous layers.  Tenotomy scissors was utilized for soft tissue dissection.  Dissection was carried forth until the fascia was encountered of the lateral compartment of the leg.  The course of the superficial peroneal nerve was identified along the adipose tissue, and a portion of the nerve was determined to have findings consistent of a neuroma.  The neuroma was sharply excised at its proximal and distal portions.  A leg fasciotomy was created to the anterior lateral aspect of the lower leg to completely decompress the nerve.    Attention was then directed to the left dorsal midfoot to perform a superficial peroneal nerve release.  In a similar manner above, skin marker, 15 blade was utilized to create a controlled depth incision, extending approximately 4.0 cm, incision carried down to the subcutaneous layers.  The tendon and muscle belly of the extensor digitorum brevis was encountered, and  it was sharply released.  The deep peroneal nerve was identified, and was found to have no structures causing compression.    All surgical sites were irrigated.  A 8.0 x 4.0 human tissue amnio graft was split longitudinally and applied to the incisions of the common fibular nerve release as well as the superficial fibular nerve release.  Primary closure was conducted via 3-0 Vicryl for deep layers, 4-0 Monocryl for subcuticular, 3-0 nylon for overlying skin. 20 cc of 1:1 1% lidocaine:0.5% bupivacaine  local anesthesia was infiltrated to the patient's incisional sites. Left foot was dressed with Xeroform overlying all incisions, gauze padding, cast padding, Ace to secure.    Tourniquet was deflated.  Patient was un-induced from anesthesia, and transferred from OR table to stretcher and wheeled to recovery room with vital signs stable. Following a period of post-op monitoring, the patient will be discharged to the home setting for follow up in the clinical setting.      Significant Surgical Tasks Conducted by the Assistant(s), if Applicable: Suturing, irrigation, retraction    Estimated Blood Loss (EBL): * No values recorded between 6/13/2025  7:03 AM and 6/13/2025  8:48 AM *           Implants:   Implant Name Type Inv. Item Serial No.  Lot No. LRB No. Used Action   [id number]  TISSUE HUMAN AMNIO [id number] [employer].  Left 1 Implanted       Specimens:   Specimen (24h ago, onward)      None           * No specimens in log *           Condition: Good    Disposition: PACU - hemodynamically stable.    Attestation: Op Note Attestation: The attending physician was present and scrubbed for the entire procedure.

## 2025-06-13 NOTE — DISCHARGE INSTRUCTIONS
POST-OPERATIVE INSTRUCTIONS FOR PODIATRY PATIENTS     ** Keep your dressing dry. Do not remove or change the bandage.     ** Keep your foot elevated to the level of your heart. You should recline as far as possible (When your toes are at eye level you're in the right position).     ** You may apply an ice pack to the top of your foot or back of your knee. Replace as needed. Make sure this does not get the dressing wet.     ** Take your pain medication as instructed for the first 24 hours after surgery, then progress to using them only when you need it.     ** Stay off your foot as much as possible. You may get up to eat, use the bathroom, etc.  No long standing or walking on your  foot. Do not sit with your feet dangling.       ** When you do walk make sure you:       a. wear your surgical shoe/boot as provided       b. use your crutches, if you have them      c. You may bear weight as tolerated, short distances to your left foot    ** Eat your regular diet and drink plenty of fluids.     If any of the following conditions are present, call the Podiatry   Clinic immediately, or the Advice Nurse at night or on the weekend.      a. fever 101° or higher      b. pain that is not controlled by pain medication      c. red, warm, swollen feet with red streaks going up your legs      d. cold, blue, numb toes, especially if you are wearing a cast       Make sure to follow up with your post-op appointment as scheduled.

## 2025-06-13 NOTE — ANESTHESIA PROCEDURE NOTES
Intubation    Date/Time: 6/13/2025 7:20 AM    Performed by: Stephanie Wheeler CRNA  Authorized by: Saleem Cee MD    Intubation:     Induction:  Intravenous    Intubated:  Postinduction    Mask Ventilation:  Easy mask    Attempts:  1    Attempted By:  CRNA    Method of Intubation:  Direct    Difficult Airway Encountered?: No      Complications:  None    Airway Device:  Supraglottic airway/LMA    Airway Device Size:  4.0    Style/Cuff Inflation:  Uncuffed    Secured at:  The lips    Placement Verified By:  Capnometry    Complicating Factors:  Obesity    Findings Post-Intubation:  Atraumatic/condition of teeth unchanged

## 2025-06-13 NOTE — ANESTHESIA PREPROCEDURE EVALUATION
06/13/2025  Van Jones is a 59 y.o., male.      Pre-op Assessment    I have reviewed the Patient Summary Reports.     I have reviewed the Nursing Notes. I have reviewed the NPO Status.      Review of Systems  Anesthesia Hx:               Denies Personal Hx of Anesthesia complications.                    Neurological:    Neuromuscular Disease,                                 Neuromuscular Disease   Psych:  Psychiatric History              Physical Exam  General: Well nourished    Airway:  Mallampati: II   Mouth Opening: Normal  Neck ROM: Normal ROM    Anesthesia Plan  Type of Anesthesia, risks & benefits discussed:    Anesthesia Type: Gen Natural Airway, Gen Supraglottic Airway  Informed Consent: Informed consent signed with the Patient and all parties understand the risks and agree with anesthesia plan.  All questions answered.   ASA Score: 3  Day of Surgery Review of History & Physical: H&P completed by Anesthesiologist.    Ready For Surgery From Anesthesia Perspective.   .

## 2025-06-13 NOTE — BRIEF OP NOTE
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 Item Serial No.  Lot No. LRB No. Used Action   [id number]  TISSUE HUMAN AMNIO [id number] [employer].  Left 1 Implanted       Operative Findings: Common fibular nerve release at the level of the fibular neck; superficial fibular nerve neuroma resection at lateral leg; deep fibular nerve release at dorsum of foot. No acute intra-op findings. 8.0 x 4.0 cm human tissue amnion graft split lengthwise and applied to incisions of CFN and SFN.     Estimated Blood Loss: * No values recorded between 6/13/2025  7:03 AM and 6/13/2025  8:49 AM *    Estimated Blood Loss has not been documented. EBL = 10 mL.         Specimens:   Specimen (24h ago, onward)      None          * No specimens in log *    [id number]

## 2025-06-13 NOTE — ANESTHESIA POSTPROCEDURE EVALUATION
Anesthesia Post Evaluation    Patient: Van Jones    Procedure(s) Performed: Procedure(s) (LRB):  DECOMPRESSION, NERVE (Left)    Final Anesthesia Type: general      Patient location during evaluation: PACU  Patient participation: Yes- Able to Participate  Level of consciousness: awake and alert  Post-procedure vital signs: reviewed and stable  Pain management: adequate  Airway patency: patent    PONV status at discharge: No PONV  Anesthetic complications: no      Cardiovascular status: blood pressure returned to baseline  Respiratory status: unassisted  Hydration status: euvolemic            Vitals Value Taken Time   /58 06/13/25 09:17   Temp 36.7 °C (98 °F) 06/13/25 08:47   Pulse 80 06/13/25 09:26   Resp 24 06/13/25 09:26   SpO2 93 % 06/13/25 09:26   Vitals shown include unfiled device data.      Event Time   Out of Recovery 09:05:00         Pain/Amrit Score: Amrit Score: 10 (6/13/2025  9:15 AM)

## 2025-06-13 NOTE — PLAN OF CARE
Pt in preop bay 29, VSS, and IV inserted. Pt denies any open wounds on body. Pt last used mounjuro 6/3. Pt needs admit order, anesthesia consents signed, updated H&P, and site marked, otherwise ready to roll.    Procedural consents verified with pt.

## 2025-06-14 ENCOUNTER — PATIENT MESSAGE (OUTPATIENT)
Dept: PODIATRY | Facility: CLINIC | Age: 60
End: 2025-06-14
Payer: MEDICARE

## 2025-06-16 ENCOUNTER — OFFICE VISIT (OUTPATIENT)
Dept: RHEUMATOLOGY | Facility: CLINIC | Age: 60
End: 2025-06-16
Payer: MEDICARE

## 2025-06-16 VITALS
SYSTOLIC BLOOD PRESSURE: 136 MMHG | HEIGHT: 67 IN | WEIGHT: 188.06 LBS | BODY MASS INDEX: 29.52 KG/M2 | DIASTOLIC BLOOD PRESSURE: 84 MMHG | HEART RATE: 120 BPM

## 2025-06-16 DIAGNOSIS — M45.A2 NON-RADIOGRAPHIC AXIAL SPONDYLOARTHRITIS OF CERVICAL REGION: Primary | ICD-10-CM

## 2025-06-16 DIAGNOSIS — M47.812 SPONDYLOSIS OF CERVICAL REGION WITHOUT MYELOPATHY OR RADICULOPATHY: ICD-10-CM

## 2025-06-16 DIAGNOSIS — L40.9 PSORIASIS: ICD-10-CM

## 2025-06-16 DIAGNOSIS — G90.50 RSD (REFLEX SYMPATHETIC DYSTROPHY): ICD-10-CM

## 2025-06-16 DIAGNOSIS — M35.00 SJOGREN'S SYNDROME, WITH UNSPECIFIED ORGAN INVOLVEMENT: ICD-10-CM

## 2025-06-16 DIAGNOSIS — B37.2 YEAST DERMATITIS: ICD-10-CM

## 2025-06-16 DIAGNOSIS — L40.50 PSA (PSORIATIC ARTHRITIS): ICD-10-CM

## 2025-06-16 PROCEDURE — 99214 OFFICE O/P EST MOD 30 MIN: CPT | Mod: S$PBB,,, | Performed by: INTERNAL MEDICINE

## 2025-06-16 PROCEDURE — 99213 OFFICE O/P EST LOW 20 MIN: CPT | Mod: PBBFAC,PO | Performed by: INTERNAL MEDICINE

## 2025-06-16 PROCEDURE — 99999 PR PBB SHADOW E&M-EST. PATIENT-LVL III: CPT | Mod: PBBFAC,,, | Performed by: INTERNAL MEDICINE

## 2025-06-16 RX ORDER — SECUKINUMAB 150 MG/ML
150 INJECTION SUBCUTANEOUS
Qty: 1 ML | Refills: 11 | Status: ACTIVE | OUTPATIENT
Start: 2025-06-16 | End: 2025-07-01 | Stop reason: ALTCHOICE

## 2025-06-16 RX ORDER — SECUKINUMAB 150 MG/ML
150 INJECTION SUBCUTANEOUS WEEKLY
Qty: 5 ML | Refills: 0 | Status: ACTIVE | OUTPATIENT
Start: 2025-06-16 | End: 2025-07-01 | Stop reason: ALTCHOICE

## 2025-06-16 RX ORDER — FLUCONAZOLE 150 MG/1
150 TABLET ORAL DAILY
Qty: 7 TABLET | Refills: 0 | Status: SHIPPED | OUTPATIENT
Start: 2025-06-16 | End: 2025-06-23

## 2025-06-16 RX ORDER — HYDROCODONE BITARTRATE AND ACETAMINOPHEN 7.5; 325 MG/1; MG/1
1 TABLET ORAL EVERY 6 HOURS PRN
Qty: 120 TABLET | Refills: 0 | Status: SHIPPED | OUTPATIENT
Start: 2025-06-16 | End: 2025-07-16

## 2025-06-16 RX ORDER — CLOTRIMAZOLE AND BETAMETHASONE DIPROPIONATE 10; .64 MG/G; MG/G
CREAM TOPICAL 2 TIMES DAILY
Qty: 45 G | Refills: 0 | Status: SHIPPED | OUTPATIENT
Start: 2025-06-16

## 2025-06-16 RX ORDER — HYDROCODONE BITARTRATE AND ACETAMINOPHEN 7.5; 325 MG/1; MG/1
1 TABLET ORAL EVERY 8 HOURS PRN
Qty: 90 TABLET | Refills: 0 | Status: SHIPPED | OUTPATIENT
Start: 2025-08-13 | End: 2025-09-12

## 2025-06-16 RX ORDER — HYDROCODONE BITARTRATE AND ACETAMINOPHEN 7.5; 325 MG/1; MG/1
1 TABLET ORAL EVERY 8 HOURS PRN
Qty: 90 TABLET | Refills: 0 | Status: SHIPPED | OUTPATIENT
Start: 2025-07-14 | End: 2025-08-13
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
## 2025-06-17 ENCOUNTER — PATIENT OUTREACH (OUTPATIENT)
Dept: ADMINISTRATIVE | Facility: CLINIC | Age: 60
End: 2025-06-17
Payer: MEDICARE

## 2025-06-17 NOTE — PROGRESS NOTES
C3 nurse spoke with Van Jones  for a TCC post hospital discharge follow up call. The patient has a scheduled [facility] appointment with Dr. Ankur Flor on 06/24/2025 @ 830 am. Patient stated he visited Rheumatologist yesterday, 06/16/2025.  Patient stated he has an appointment with Bear Ray DO coming up; unsure of date. Unable to message PCP staff.

## 2025-06-24 ENCOUNTER — OFFICE VISIT (OUTPATIENT)
Dept: PODIATRY | Facility: CLINIC | Age: 60
End: 2025-06-24
Payer: MEDICARE

## 2025-06-24 VITALS
HEART RATE: 106 BPM | HEIGHT: 67 IN | BODY MASS INDEX: 29.45 KG/M2 | SYSTOLIC BLOOD PRESSURE: 113 MMHG | DIASTOLIC BLOOD PRESSURE: 77 MMHG

## 2025-06-24 DIAGNOSIS — M79.672 LEFT FOOT PAIN: ICD-10-CM

## 2025-06-24 DIAGNOSIS — G57.82 ENTRAPMENT OF LEFT ILIOINGUINAL NERVE: Primary | ICD-10-CM

## 2025-06-24 PROCEDURE — 99024 POSTOP FOLLOW-UP VISIT: CPT | Mod: POP,,, | Performed by: PODIATRIST

## 2025-06-24 PROCEDURE — 99999 PR PBB SHADOW E&M-EST. PATIENT-LVL IV: CPT | Mod: PBBFAC,,, | Performed by: PODIATRIST

## 2025-06-24 PROCEDURE — 99214 OFFICE O/P EST MOD 30 MIN: CPT | Mod: PBBFAC | Performed by: PODIATRIST

## 2025-06-24 NOTE — PROGRESS NOTES
Patient is proximally 10 days status post left common peroneal/deep peroneal/superficial peroneal nerve decompression.  He is doing excellent.  He is having 0 pain.  He states that this is the 1st time he has had no pain since his original surgery back in the 1980s.  Incisions are healing well no signs of infection or dehiscence.  Continue postoperative instructions and follow-up in 1 week.

## 2025-06-26 ENCOUNTER — LAB VISIT (OUTPATIENT)
Dept: LAB | Facility: HOSPITAL | Age: 60
End: 2025-06-26
Attending: INTERNAL MEDICINE
Payer: MEDICARE

## 2025-06-26 DIAGNOSIS — M45.A2 NON-RADIOGRAPHIC AXIAL SPONDYLOARTHRITIS OF CERVICAL REGION: ICD-10-CM

## 2025-06-26 DIAGNOSIS — L40.50 PSA (PSORIATIC ARTHRITIS): ICD-10-CM

## 2025-06-26 DIAGNOSIS — M35.00 SJOGREN SYNDROME: ICD-10-CM

## 2025-06-26 DIAGNOSIS — E78.5 HYPERLIPIDEMIA, UNSPECIFIED HYPERLIPIDEMIA TYPE: ICD-10-CM

## 2025-06-26 DIAGNOSIS — R79.82 ELEVATED C-REACTIVE PROTEIN (CRP): ICD-10-CM

## 2025-06-26 DIAGNOSIS — E66.811 OBESITY, CLASS I, BMI 30-34.9: ICD-10-CM

## 2025-06-26 DIAGNOSIS — G90.50 RSD (REFLEX SYMPATHETIC DYSTROPHY): ICD-10-CM

## 2025-06-26 DIAGNOSIS — B37.2 YEAST DERMATITIS: ICD-10-CM

## 2025-06-26 DIAGNOSIS — E61.2 MAGNESIUM DEFICIENCY: ICD-10-CM

## 2025-06-26 DIAGNOSIS — G47.00 INSOMNIA, UNSPECIFIED TYPE: ICD-10-CM

## 2025-06-26 DIAGNOSIS — M35.00 SJOGREN'S SYNDROME, WITH UNSPECIFIED ORGAN INVOLVEMENT: ICD-10-CM

## 2025-06-26 DIAGNOSIS — R79.9 ABNORMAL BLOOD CHEMISTRY: ICD-10-CM

## 2025-06-26 DIAGNOSIS — M47.812 SPONDYLOSIS OF CERVICAL REGION WITHOUT MYELOPATHY OR RADICULOPATHY: ICD-10-CM

## 2025-06-26 DIAGNOSIS — E11.8 TYPE II DIABETES MELLITUS WITH COMPLICATION: ICD-10-CM

## 2025-06-26 DIAGNOSIS — L40.9 PSORIASIS: ICD-10-CM

## 2025-06-26 DIAGNOSIS — E55.9 AVITAMINOSIS D: ICD-10-CM

## 2025-06-26 DIAGNOSIS — E11.8 DIABETIC COMPLICATION: ICD-10-CM

## 2025-06-26 LAB
25(OH)D3+25(OH)D2 SERPL-MCNC: 59 NG/ML (ref 30–96)
ABSOLUTE EOSINOPHIL (OHS): 0.25 K/UL
ABSOLUTE MONOCYTE (OHS): 0.56 K/UL (ref 0.3–1)
ABSOLUTE NEUTROPHIL COUNT (OHS): 4.35 K/UL (ref 1.8–7.7)
ALBUMIN SERPL BCP-MCNC: 4.4 G/DL (ref 3.5–5.2)
ALP SERPL-CCNC: 50 UNIT/L (ref 40–150)
ALT SERPL W/O P-5'-P-CCNC: 20 UNIT/L (ref 10–44)
ANION GAP (OHS): 9 MMOL/L (ref 8–16)
AST SERPL-CCNC: 20 UNIT/L (ref 11–45)
BASOPHILS # BLD AUTO: 0.07 K/UL
BASOPHILS NFR BLD AUTO: 1 %
BILIRUB SERPL-MCNC: 0.4 MG/DL (ref 0.1–1)
BUN SERPL-MCNC: 24 MG/DL (ref 6–20)
CALCIUM SERPL-MCNC: 9.6 MG/DL (ref 8.7–10.5)
CHLORIDE SERPL-SCNC: 109 MMOL/L (ref 95–110)
CO2 SERPL-SCNC: 26 MMOL/L (ref 23–29)
CREAT SERPL-MCNC: 1.2 MG/DL (ref 0.5–1.4)
CRP SERPL-MCNC: 1.8 MG/L
ERYTHROCYTE [DISTWIDTH] IN BLOOD BY AUTOMATED COUNT: 13 % (ref 11.5–14.5)
ERYTHROCYTE [SEDIMENTATION RATE] IN BLOOD BY PHOTOMETRIC METHOD: 5 MM/HR
GFR SERPLBLD CREATININE-BSD FMLA CKD-EPI: >60 ML/MIN/1.73/M2
GLUCOSE SERPL-MCNC: 96 MG/DL (ref 70–110)
HCT VFR BLD AUTO: 41.5 % (ref 40–54)
HGB BLD-MCNC: 13.5 GM/DL (ref 14–18)
IMM GRANULOCYTES # BLD AUTO: 0.02 K/UL (ref 0–0.04)
IMM GRANULOCYTES NFR BLD AUTO: 0.3 % (ref 0–0.5)
LYMPHOCYTES # BLD AUTO: 1.95 K/UL (ref 1–4.8)
MCH RBC QN AUTO: 30.4 PG (ref 27–31)
MCHC RBC AUTO-ENTMCNC: 32.5 G/DL (ref 32–36)
MCV RBC AUTO: 94 FL (ref 82–98)
NUCLEATED RBC (/100WBC) (OHS): 0 /100 WBC
PLATELET # BLD AUTO: 196 K/UL (ref 150–450)
PMV BLD AUTO: 11 FL (ref 9.2–12.9)
POTASSIUM SERPL-SCNC: 5.6 MMOL/L (ref 3.5–5.1)
PROT SERPL-MCNC: 7.3 GM/DL (ref 6–8.4)
RBC # BLD AUTO: 4.44 M/UL (ref 4.6–6.2)
RELATIVE EOSINOPHIL (OHS): 3.5 %
RELATIVE LYMPHOCYTE (OHS): 27.1 % (ref 18–48)
RELATIVE MONOCYTE (OHS): 7.8 % (ref 4–15)
RELATIVE NEUTROPHIL (OHS): 60.3 % (ref 38–73)
SODIUM SERPL-SCNC: 144 MMOL/L (ref 136–145)
TESTOST SERPL-MCNC: 455 NG/DL (ref 304–1227)
WBC # BLD AUTO: 7.2 K/UL (ref 3.9–12.7)

## 2025-06-26 PROCEDURE — 86140 C-REACTIVE PROTEIN: CPT

## 2025-06-26 PROCEDURE — 80053 COMPREHEN METABOLIC PANEL: CPT

## 2025-06-26 PROCEDURE — 85025 COMPLETE CBC W/AUTO DIFF WBC: CPT

## 2025-06-26 PROCEDURE — 36415 COLL VENOUS BLD VENIPUNCTURE: CPT | Mod: PO

## 2025-06-26 PROCEDURE — 84403 ASSAY OF TOTAL TESTOSTERONE: CPT

## 2025-06-26 PROCEDURE — 85652 RBC SED RATE AUTOMATED: CPT

## 2025-06-26 PROCEDURE — 82306 VITAMIN D 25 HYDROXY: CPT | Mod: GZ

## 2025-07-01 ENCOUNTER — TELEPHONE (OUTPATIENT)
Dept: RHEUMATOLOGY | Facility: CLINIC | Age: 60
End: 2025-07-01
Payer: MEDICARE

## 2025-07-01 ENCOUNTER — PATIENT MESSAGE (OUTPATIENT)
Dept: RHEUMATOLOGY | Facility: CLINIC | Age: 60
End: 2025-07-01
Payer: MEDICARE

## 2025-07-01 DIAGNOSIS — L40.50 PSA (PSORIATIC ARTHRITIS): ICD-10-CM

## 2025-07-01 DIAGNOSIS — L40.9 PSORIASIS: ICD-10-CM

## 2025-07-01 DIAGNOSIS — M47.812 SPONDYLOSIS OF CERVICAL REGION WITHOUT MYELOPATHY OR RADICULOPATHY: Primary | ICD-10-CM

## 2025-07-01 RX ORDER — IXEKIZUMAB 80 MG/ML
INJECTION, SOLUTION SUBCUTANEOUS
Qty: 2 ML | Refills: 0 | Status: ACTIVE | OUTPATIENT
Start: 2025-07-01

## 2025-07-01 RX ORDER — IXEKIZUMAB 80 MG/ML
80 INJECTION, SOLUTION SUBCUTANEOUS
Qty: 1 ML | Refills: 11 | Status: ACTIVE | OUTPATIENT
Start: 2025-07-01

## 2025-07-01 NOTE — TELEPHONE ENCOUNTER
AGGIE Garrett discussed with Dr. Muniz and sent message to [facility] that it is fine to change to Taltz. Sent rx for taltz to [facility]   01-Oct-2022 18:19

## 2025-07-01 NOTE — TELEPHONE ENCOUNTER
----- Message from Marquis Joyce sent at 6/30/2025  3:43 PM CDT -----  Regarding: Cosentyx  Carter Leiva,    Unfortunately, the patient's plan will not cover Cosentyx until both Taltz and Humira have been tried. Would it be appropriate to change therapy to Taltz per plan preference?    Thank you,    Isiah Velez, PharmD  Clinical Pharmacist   [facility]   P: [phone]

## 2025-07-02 ENCOUNTER — OFFICE VISIT (OUTPATIENT)
Dept: PODIATRY | Facility: CLINIC | Age: 60
End: 2025-07-02
Payer: MEDICARE

## 2025-07-02 VITALS
SYSTOLIC BLOOD PRESSURE: 101 MMHG | HEART RATE: 86 BPM | HEIGHT: 67 IN | BODY MASS INDEX: 29.45 KG/M2 | DIASTOLIC BLOOD PRESSURE: 69 MMHG

## 2025-07-02 DIAGNOSIS — L40.50 PSA (PSORIATIC ARTHRITIS): ICD-10-CM

## 2025-07-02 DIAGNOSIS — G57.82 ENTRAPMENT OF LEFT ILIOINGUINAL NERVE: Primary | ICD-10-CM

## 2025-07-02 DIAGNOSIS — Z87.81 HISTORY OF FEMUR FRACTURE: ICD-10-CM

## 2025-07-02 DIAGNOSIS — G89.4 CHRONIC PAIN SYNDROME: Primary | ICD-10-CM

## 2025-07-02 PROBLEM — M45.A2: Status: ACTIVE | Noted: 2025-07-02

## 2025-07-02 PROCEDURE — 99999 PR PBB SHADOW E&M-EST. PATIENT-LVL III: CPT | Mod: PBBFAC,,, | Performed by: PODIATRIST

## 2025-07-02 PROCEDURE — 99213 OFFICE O/P EST LOW 20 MIN: CPT | Mod: PBBFAC | Performed by: PODIATRIST

## 2025-07-02 PROCEDURE — 99024 POSTOP FOLLOW-UP VISIT: CPT | Mod: POP,,, | Performed by: PODIATRIST

## 2025-07-02 NOTE — TELEPHONE ENCOUNTER
----- Message from Marquis Joyce sent at 6/30/2025  4:37 PM CDT -----  Regarding: RE: Cosentyx  Thanks for the quick response! Can Dr Muniz send us those Taltz orders?  ----- Message -----  From: Gerry Kyle RN  Sent: 6/30/2025   4:33 PM CDT  To: Isiah Velez, [place]  Subject: FW: Cosentyx                                     Dr. Muniz says it is appropriate to change to Taltz per plan preference.  Thank you,  Gerry Kyle RN  ----- Message -----  From: Isiah Velez PharmD  Sent: 6/30/2025   3:44 PM CDT  To: Abbie Tesfaye, PharmD  Subject: Cosentyx                                         Carter Leiva,    Unfortunately, the patient's plan will not cover Cosentyx until both Taltz and Humira have been tried. Would it be appropriate to change therapy to Taltz per plan preference?    Thank you,    Isiah Velez, PharmD  Clinical Pharmacist   [facility]   P: [phone]

## 2025-07-02 NOTE — PROGRESS NOTES
Patient is proximally 14 days status post left common peroneal/deep peroneal/superficial peroneal nerve decompression.  He is doing excellent.  He is having 0 pain.  He states that this is the 1st time he has had no pain since his original surgery back in the 1980s.  Incisions are healing well no signs of infection or dehiscence.  Continue postoperative instructions and follow-up in 1 week.

## 2025-07-03 ENCOUNTER — PATIENT MESSAGE (OUTPATIENT)
Dept: RHEUMATOLOGY | Facility: CLINIC | Age: 60
End: 2025-07-03
Payer: MEDICARE

## 2025-07-03 DIAGNOSIS — L40.50 PSA (PSORIATIC ARTHRITIS): Primary | ICD-10-CM

## 2025-07-05 RX ORDER — IXEKIZUMAB 80 MG/ML
INJECTION, SOLUTION SUBCUTANEOUS
Qty: 2 ML | Refills: 0 | Status: SHIPPED | OUTPATIENT
Start: 2025-07-05

## 2025-07-05 RX ORDER — IXEKIZUMAB 80 MG/ML
80 INJECTION, SOLUTION SUBCUTANEOUS
Qty: 1 ML | Refills: 11 | Status: SHIPPED | OUTPATIENT
Start: 2025-07-05

## 2025-07-07 ENCOUNTER — PATIENT MESSAGE (OUTPATIENT)
Dept: PODIATRY | Facility: CLINIC | Age: 60
End: 2025-07-07
Payer: MEDICARE

## 2025-07-07 RX ORDER — AMITRIPTYLINE HYDROCHLORIDE 25 MG/1
25 TABLET, FILM COATED ORAL NIGHTLY PRN
Qty: 30 TABLET | Refills: 2 | Status: SHIPPED | OUTPATIENT
Start: 2025-07-07 | End: 2026-07-07

## 2025-07-08 RX ORDER — ADALIMUMAB 40MG/0.4ML
40 KIT SUBCUTANEOUS
Qty: 2 PEN | Refills: 11 | Status: ACTIVE | OUTPATIENT
Start: 2025-07-08

## 2025-07-09 ENCOUNTER — OFFICE VISIT (OUTPATIENT)
Dept: PODIATRY | Facility: CLINIC | Age: 60
End: 2025-07-09
Payer: MEDICARE

## 2025-07-09 VITALS
BODY MASS INDEX: 29.45 KG/M2 | HEIGHT: 67 IN | HEART RATE: 83 BPM | DIASTOLIC BLOOD PRESSURE: 81 MMHG | SYSTOLIC BLOOD PRESSURE: 114 MMHG

## 2025-07-09 DIAGNOSIS — G57.82 ENTRAPMENT OF LEFT ILIOINGUINAL NERVE: Primary | ICD-10-CM

## 2025-07-09 DIAGNOSIS — M79.672 LEFT FOOT PAIN: ICD-10-CM

## 2025-07-09 PROCEDURE — 99024 POSTOP FOLLOW-UP VISIT: CPT | Mod: POP,,, | Performed by: PODIATRIST

## 2025-07-09 PROCEDURE — 99999 PR PBB SHADOW E&M-EST. PATIENT-LVL IV: CPT | Mod: PBBFAC,,, | Performed by: PODIATRIST

## 2025-07-09 PROCEDURE — 99214 OFFICE O/P EST MOD 30 MIN: CPT | Mod: PBBFAC | Performed by: PODIATRIST

## 2025-07-09 RX ORDER — METHOTREXATE 2.5 MG/1
4 TABLET ORAL WEEKLY
COMMUNITY

## 2025-07-09 RX ORDER — ROSUVASTATIN CALCIUM 40 MG/1
40 TABLET, COATED ORAL
COMMUNITY

## 2025-07-09 RX ORDER — VANCOMYCIN HYDROCHLORIDE 125 MG/1
125 CAPSULE ORAL 4 TIMES DAILY
COMMUNITY
Start: 2025-06-02

## 2025-07-09 RX ORDER — SERTRALINE HYDROCHLORIDE 50 MG/1
TABLET, FILM COATED ORAL
COMMUNITY

## 2025-07-09 RX ORDER — ROSUVASTATIN CALCIUM 10 MG/1
TABLET, COATED ORAL
COMMUNITY

## 2025-07-09 RX ORDER — BUPROPION HYDROCHLORIDE 100 MG/1
TABLET ORAL
COMMUNITY

## 2025-07-09 RX ORDER — TIRZEPATIDE 7.5 MG/.5ML
INJECTION, SOLUTION SUBCUTANEOUS
COMMUNITY
Start: 2025-03-24

## 2025-07-09 RX ORDER — FOLIC ACID 1 MG/1
1 TABLET ORAL DAILY
COMMUNITY
Start: 2024-12-16

## 2025-07-09 RX ORDER — NAPROXEN SODIUM 220 MG/1
TABLET, FILM COATED ORAL
COMMUNITY

## 2025-07-09 RX ORDER — TIZANIDINE 4 MG/1
4 TABLET ORAL EVERY 6 HOURS PRN
COMMUNITY
Start: 2025-03-13

## 2025-07-12 NOTE — PROGRESS NOTES
Patient is proximally 3 weeks status post left common peroneal/deep peroneal/superficial peroneal nerve decompression.  He is doing excellent.  He is having 0 pain.  He states that this is the 1st time he has had no pain since his original surgery back in the 1980s.  Incisions are healing well no signs of infection or dehiscence.  Sutures removed under asceptic conditions, dry sterile bandage applied. Patient is cleared to begin light bathing of operative site.  No oils, lotions, or ointments to incision for two weeks

## 2025-07-14 DIAGNOSIS — L40.50 PSA (PSORIATIC ARTHRITIS): ICD-10-CM

## 2025-07-14 RX ORDER — ADALIMUMAB 40MG/0.4ML
40 KIT SUBCUTANEOUS
Qty: 2 PEN | Refills: 11 | Status: ACTIVE | OUTPATIENT
Start: 2025-07-14

## 2025-07-15 DIAGNOSIS — M35.00 SJOGREN'S SYNDROME, WITH UNSPECIFIED ORGAN INVOLVEMENT: ICD-10-CM

## 2025-07-15 DIAGNOSIS — G90.50 RSD (REFLEX SYMPATHETIC DYSTROPHY): ICD-10-CM

## 2025-07-15 RX ORDER — METHOTREXATE 2.5 MG/1
TABLET ORAL
Qty: 64 TABLET | Refills: 0 | Status: SHIPPED | OUTPATIENT
Start: 2025-07-15

## 2025-07-18 ENCOUNTER — OFFICE VISIT (OUTPATIENT)
Dept: DERMATOLOGY | Facility: CLINIC | Age: 60
End: 2025-07-18
Payer: MEDICARE

## 2025-07-18 DIAGNOSIS — L90.5 SCAR: ICD-10-CM

## 2025-07-18 DIAGNOSIS — L82.1 SEBORRHEIC KERATOSES: ICD-10-CM

## 2025-07-18 DIAGNOSIS — D22.9 MULTIPLE BENIGN NEVI: Primary | ICD-10-CM

## 2025-07-18 DIAGNOSIS — L81.4 LENTIGO: ICD-10-CM

## 2025-07-18 DIAGNOSIS — Z12.83 SCREENING EXAM FOR SKIN CANCER: ICD-10-CM

## 2025-07-18 PROCEDURE — 99999 PR PBB SHADOW E&M-EST. PATIENT-LVL IV: CPT | Mod: PBBFAC,,, | Performed by: STUDENT IN AN ORGANIZED HEALTH CARE EDUCATION/TRAINING PROGRAM

## 2025-07-18 PROCEDURE — 99214 OFFICE O/P EST MOD 30 MIN: CPT | Mod: PBBFAC | Performed by: STUDENT IN AN ORGANIZED HEALTH CARE EDUCATION/TRAINING PROGRAM

## 2025-07-18 NOTE — PROGRESS NOTES
Subjective:      Patient ID:  Van Jones is a 59 y.o. male who presents for   Chief Complaint   Patient presents with    Rash     59 y.o. male presents today for a rash.    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              PMHx psoriasis and psoriatic arthritis, sjogren's  Follows with rheum, Dr. Muniz. Has been on MTX. Planning to start humira soon  Has a rash in the umbilicus that did not respond to lotrisone and fluconazole      Review of Systems    Objective:   Physical Exam   Constitutional: He appears well-developed and well-nourished. No distress.   Neurological: He is alert and oriented to person, place, and time. He is not disoriented.   Psychiatric: He has a normal mood and affect.   Skin:   Areas Examined (abnormalities noted in diagram):   Scalp / Hair Palpated and Inspected  Head / Face Inspection Performed  Neck Inspection Performed  Chest / Axilla Inspection Performed  Abdomen Inspection Performed  Back Inspection Performed  RUE Inspected  LUE Inspection Performed                 Diagram Legend     Erythematous scaling macule/papule c/w actinic keratosis       Vascular papule c/w angioma      Pigmented verrucoid papule/plaque c/w seborrheic keratosis      Yellow umbilicated papule c/w sebaceous hyperplasia      Irregularly shaped tan macule c/w lentigo     1-2 mm smooth white papules consistent with Milia      Movable subcutaneous cyst with punctum c/w epidermal inclusion cyst      Subcutaneous movable cyst c/w pilar cyst      Firm pink to brown papule c/w dermatofibroma      Pedunculated fleshy papule(s) c/w skin tag(s)      Evenly pigmented macule c/w junctional nevus     Mildly variegated pigmented, slightly irregular-bordered macule c/w mildly atypical nevus      Flesh colored to evenly  pigmented papule c/w intradermal nevus       Pink pearly papule/plaque c/w basal cell carcinoma      Erythematous hyperkeratotic cursted plaque c/w SCC      Surgical scar with no sign of skin cancer recurrence      Open and closed comedones      Inflammatory papules and pustules      Verrucoid papule consistent consistent with wart     Erythematous eczematous patches and plaques     Dystrophic onycholytic nail with subungual debris c/w onychomycosis     Umbilicated papule    Erythematous-base heme-crusted tan verrucoid plaque consistent with inflamed seborrheic keratosis     Erythematous Silvery Scaling Plaque c/w Psoriasis     See annotation      Assessment / Plan:        Multiple benign nevi  Lentigo  Seborrheic keratoses   - minor problem and chronic.   Reassurance given to patient. No treatment necessary.     Scar--umbilicus   - minor problem and chronic.   Reassurance given to patient. No treatment necessary.     Screening exam for skin cancer  Upper body skin examination performed today including at least 6 points as noted in physical examination. No lesions suspicious for malignancy noted.    Recommend daily sun protection/avoidance and use of at least SPF 30, broad spectrum sunscreen (OTC drug).              Follow up in about 1 year (around 7/18/2026) for TBSE.

## 2025-07-18 NOTE — PATIENT INSTRUCTIONS
Sunscreen Guidelines  Sunscreen protects your skin by absorbing and reflecting ultraviolet rays. All sunscreens have a sun protection factor (SPF) rating that indicates how long a sunscreen will remain effective on the skin.    Why protect your skin?  The sun's rays are composed of many different wavelengths, including UVA, UVB, and visible light that each affect the skin differently.    UVB: sunburn, photoaging, skin cancer (melanoma, basal cell, and squamous cell carcinomas) and modulation of the skin's immune system.    UVA: similar to above but thought to contribute more to aging; at the same dose of UVB it is less powerful however the sun has 10-20 times the levels of UVA as compared with UVB.  Visible light: implicated in causing unwanted darkening of skin, such as melasma and post-inflammatory hyperpigmentation in darker skin types     If I have dark skin, do I need to worry about the sun?    More darkly pigmented skin is more protected against UV-induced skin cancer, sunburn, and photoaging, though may still suffer from sun-related conditions, including melasma, hyperpigmentation, and other dark spots.    Sun avoidance  As a general rule, stay out of the sun as much as possible between 10 a.m. - 4 p.m.    Download the EPA UV index laurita to track the UV index by hour in your zip code.      Which sunscreen should I choose?  The best sunscreen to use varies by individual. The one that feels best on your skin and fits your lifestyle will be the one you will likely use most regularly.   Active ingredients of sunscreen vary by , and may be a chemical (such as avobenzone or oxybenzone) or physical agent (such as zinc oxide or titanium dioxide). I recommend a physical agent.  A water-resistant sunscreen is one that maintains the SPF level after 40 minutes of water exposure. A very water-resistant sunscreen maintains the SPF level after 80 minutes of water exposure.    Sunscreen: this is the last layer in  "sun protection   Be generous: 1 shot glass of sunscreen for your body, ½ teaspoon for your face/neck  Reapply every 2 hours  Broad spectrum (provides UVA/UVB protection), SPF 50 or above  Avoid spray sunscreens: less effective and have been found to contain benzene (carcinogen)    Sun protective clothing  Although sunscreen helps minimize sun damage, no sunscreen completely blocks all wavelengths of UV light. Wearing sun protective clothing such as hats, rashguards or swim shirts, and long sleeves and/or pants, as well as avoiding sun exposure from 10 a.m. to 4 p.m. will help protect your skin from overexposure and minimize sun damage. Seek shade.  Long sleeved clothing, hats, and sunglasses: makes sun protection easier, more effective, and can even be more affordable, since sunscreen needs to be reapplied frequently.    Solumbra (www.sunprectautions.ikaSystems)  EastMeetEast (www.Symplified)  Coolibar (www.Yotomo.ikaSystems)  Land's end (www.Page Mage)  Hats from [employer] (www.helenkaminski.com)    My Favorite Sunscreens:  Physical blockers: Can have a "white case" but in general are more effective  - Face: CeraVe tinted mineral sunscreen, Bare Minerals complexion rescue (20 shades), Elta MD (UV elements, UV physical, UV restore, etc), Tizo ultra zinc tinted, Cetaphil Sheer Mineral Face Liquid Sunscreen  - Body: Blue Lizard, Neutrogena Sheer Zinc, Eucerin Daily Protection, Aveeno Baby   "

## 2025-07-23 ENCOUNTER — PATIENT MESSAGE (OUTPATIENT)
Dept: RHEUMATOLOGY | Facility: CLINIC | Age: 60
End: 2025-07-23
Payer: MEDICARE

## 2025-08-07 ENCOUNTER — OFFICE VISIT (OUTPATIENT)
Dept: PODIATRY | Facility: CLINIC | Age: 60
End: 2025-08-07
Payer: MEDICARE

## 2025-08-07 VITALS
WEIGHT: 187.25 LBS | DIASTOLIC BLOOD PRESSURE: 78 MMHG | HEIGHT: 67 IN | HEART RATE: 79 BPM | SYSTOLIC BLOOD PRESSURE: 116 MMHG | BODY MASS INDEX: 29.39 KG/M2

## 2025-08-07 DIAGNOSIS — Z98.890 POSTOPERATIVE STATE: Primary | ICD-10-CM

## 2025-08-07 DIAGNOSIS — G57.82 ENTRAPMENT OF LEFT ILIOINGUINAL NERVE: ICD-10-CM

## 2025-08-07 PROCEDURE — 99214 OFFICE O/P EST MOD 30 MIN: CPT | Mod: PBBFAC | Performed by: PODIATRIST

## 2025-08-07 PROCEDURE — 99999 PR PBB SHADOW E&M-EST. PATIENT-LVL IV: CPT | Mod: PBBFAC,,, | Performed by: PODIATRIST

## 2025-08-07 NOTE — PROGRESS NOTES
Subjective:      Patient ID: Van Jones is a 60 y.o. male.    Chief Complaint: Post-op Evaluation (Left foot) and Diabetes Mellitus    Van is a 60 y.o. male who presents to the clinic complaining of thick and discolored toenails on both feet. Van is inquiring about treatment options.  He has not been able to follow-up in some time however previously he did very well with the nerve injections for nerve blocks with relief approximately 3 to have days.  He is here to discuss possible surgical intervention as discussed before.  History of nerve damage left lower extremity status post femoral fracture in the 1980s.    8/7: Patient her for post op visit #4 for L superficial peroneal nerve, common peroneal nerve, tarsal tunnel decompression.  Patient reports complete pain relief to L lower extremity.  Surgical incision sites remain closed and are fully healed.  Denies fevers, chills, nausea, or vomiting.  Denies SOB or chest pain.  Denies any new pedal complaints    Review of Systems   Constitutional: Negative for chills and fever.   HENT:  Negative for congestion and tinnitus.    Eyes:  Negative for double vision and visual disturbance.   Cardiovascular:  Negative for chest pain and claudication.   Respiratory:  Negative for hemoptysis and shortness of breath.    Endocrine: Negative for cold intolerance and heat intolerance.   Hematologic/Lymphatic: Negative for adenopathy and bleeding problem.   Skin:  Positive for color change and nail changes.   Musculoskeletal:  Negative for myalgias and stiffness.   Gastrointestinal:  Negative for nausea and vomiting.   Genitourinary:  Negative for dysuria and hematuria.   Neurological:  Negative for numbness and paresthesias.   Psychiatric/Behavioral:  Negative for altered mental status and suicidal ideas.    Allergic/Immunologic: Negative for environmental allergies and persistent infections.         Objective:      Physical Exam  Constitutional:       Appearance: He  is well-developed.   Cardiovascular:      Pulses:           Dorsalis pedis pulses are 2+ on the right side and 2+ on the left side.        Posterior tibial pulses are 2+ on the right side and 2+ on the left side.   Musculoskeletal:      Right foot: Normal range of motion. No deformity.      Left foot: Normal range of motion. No deformity.      Comments: Surgical incision sites fully healed.  No pain to palpation to areas of tarsal tunnel, superficial peroneal nerve, and common peroneal nerve.  No erythema or swelling noted.   Feet:      Right foot:      Skin integrity: No skin breakdown or erythema.      Left foot:      Skin integrity: No skin breakdown or erythema.   Skin:     General: Skin is warm and dry.      Nails: There is no clubbing.      Comments: Skin turgor is normal bilaterally. Skin texture is well hydrated to both lower extremities. No lesions or rashes or wounds appreciated bilaterally. The nail plates 1 through 5 bilaterally specifically the bilateral hallux nails are noted to be thickened discolored with mild incurvation and tenderness noted.   Neurological:      Mental Status: He is oriented to person, place, and time.      Sensory: No sensory deficit.      Deep Tendon Reflexes:      Reflex Scores:       Patellar reflexes are 2+ on the right side and 2+ on the left side.       Achilles reflexes are 2+ on the right side and 2+ on the left side.     Comments: Sharp, dull, light touch, vibratory, and proprioceptive sensation are intact bilaterally. Deep tendon reflexes to patellar and Achilles tendon are symmetrical, 2/4 bilaterally. No ankle clonus or Babinski reflexes noted bilaterally. Coordination is normal to both feet and lower extremities.           Assessment:       Encounter Diagnoses   Name Primary?    Entrapment of left ilioinguinal nerve     Postoperative state Yes             Plan:       Van was seen today for post-op evaluation and diabetes mellitus.    Diagnoses and all orders for  this visit:    Postoperative state    Entrapment of left ilioinguinal nerve          I counseled the patient on his conditions, their implications and medical management.    Discussed with the patient that he had fully healed his surgical incision sites with great improvements of pain to his L lower extremity    WBAT    RTC 12 months    Daisha Newberry DPM   Podiatric Medicine & Surgery  [facility]

## 2025-08-08 ENCOUNTER — PATIENT MESSAGE (OUTPATIENT)
Dept: RHEUMATOLOGY | Facility: CLINIC | Age: 60
End: 2025-08-08
Payer: MEDICARE

## 2025-08-08 DIAGNOSIS — M35.00 SJOGREN'S SYNDROME, WITH UNSPECIFIED ORGAN INVOLVEMENT: Primary | ICD-10-CM

## 2025-08-08 DIAGNOSIS — L40.9 PSORIASIS: ICD-10-CM

## 2025-08-08 DIAGNOSIS — G89.4 CHRONIC PAIN SYNDROME: ICD-10-CM

## 2025-08-08 DIAGNOSIS — M47.812 SPONDYLOSIS OF CERVICAL REGION WITHOUT MYELOPATHY OR RADICULOPATHY: ICD-10-CM

## 2025-08-08 DIAGNOSIS — L40.50 PSA (PSORIATIC ARTHRITIS): ICD-10-CM

## 2025-08-11 ENCOUNTER — OFFICE VISIT (OUTPATIENT)
Dept: PSYCHIATRY | Facility: CLINIC | Age: 60
End: 2025-08-11
Payer: MEDICARE

## 2025-08-11 VITALS
BODY MASS INDEX: 29.45 KG/M2 | HEART RATE: 77 BPM | DIASTOLIC BLOOD PRESSURE: 78 MMHG | WEIGHT: 188.06 LBS | SYSTOLIC BLOOD PRESSURE: 135 MMHG

## 2025-08-11 DIAGNOSIS — G93.32 CHRONIC FATIGUE AND IMMUNE DYSFUNCTION SYNDROME: Primary | ICD-10-CM

## 2025-08-11 DIAGNOSIS — G90.50 RSD (REFLEX SYMPATHETIC DYSTROPHY): ICD-10-CM

## 2025-08-11 DIAGNOSIS — D89.89 CHRONIC FATIGUE AND IMMUNE DYSFUNCTION SYNDROME: Primary | ICD-10-CM

## 2025-08-11 DIAGNOSIS — M35.00 SJOGREN'S SYNDROME, WITH UNSPECIFIED ORGAN INVOLVEMENT: ICD-10-CM

## 2025-08-11 DIAGNOSIS — G47.00 INSOMNIA, UNSPECIFIED TYPE: ICD-10-CM

## 2025-08-11 DIAGNOSIS — F33.42 RECURRENT MAJOR DEPRESSIVE DISORDER, IN FULL REMISSION: ICD-10-CM

## 2025-08-11 PROCEDURE — G2211 COMPLEX E/M VISIT ADD ON: HCPCS | Mod: ,,, | Performed by: STUDENT IN AN ORGANIZED HEALTH CARE EDUCATION/TRAINING PROGRAM

## 2025-08-11 PROCEDURE — 99999 PR PBB SHADOW E&M-EST. PATIENT-LVL II: CPT | Mod: PBBFAC,,, | Performed by: STUDENT IN AN ORGANIZED HEALTH CARE EDUCATION/TRAINING PROGRAM

## 2025-08-11 PROCEDURE — 99214 OFFICE O/P EST MOD 30 MIN: CPT | Mod: S$PBB,,, | Performed by: STUDENT IN AN ORGANIZED HEALTH CARE EDUCATION/TRAINING PROGRAM

## 2025-08-11 PROCEDURE — 99212 OFFICE O/P EST SF 10 MIN: CPT | Mod: PBBFAC | Performed by: STUDENT IN AN ORGANIZED HEALTH CARE EDUCATION/TRAINING PROGRAM

## 2025-08-11 RX ORDER — ARMODAFINIL 150 MG/1
150 TABLET ORAL DAILY
Qty: 30 TABLET | Refills: 2 | Status: SHIPPED | OUTPATIENT
Start: 2025-08-11 | End: 2025-11-09

## 2025-08-11 RX ORDER — SECUKINUMAB 150 MG/ML
150 INJECTION SUBCUTANEOUS
Qty: 1 ML | Refills: 11 | Status: SHIPPED | OUTPATIENT
Start: 2025-08-11

## 2025-08-11 RX ORDER — SECUKINUMAB 150 MG/ML
150 INJECTION SUBCUTANEOUS WEEKLY
Qty: 4 ML | Refills: 0 | Status: SHIPPED | OUTPATIENT
Start: 2025-08-11

## 2025-08-12 PROBLEM — L40.50 PSA (PSORIATIC ARTHRITIS): Status: ACTIVE | Noted: 2025-08-12

## 2025-08-13 DIAGNOSIS — M35.00 SJOGREN'S SYNDROME, WITH UNSPECIFIED ORGAN INVOLVEMENT: ICD-10-CM

## 2025-08-13 DIAGNOSIS — M47.812 SPONDYLOSIS OF CERVICAL REGION WITHOUT MYELOPATHY OR RADICULOPATHY: ICD-10-CM

## 2025-08-13 DIAGNOSIS — M45.A2 NON-RADIOGRAPHIC AXIAL SPONDYLOARTHRITIS OF CERVICAL REGION: ICD-10-CM

## 2025-08-13 DIAGNOSIS — G90.50 RSD (REFLEX SYMPATHETIC DYSTROPHY): ICD-10-CM

## 2025-08-13 DIAGNOSIS — M54.9 MID BACK PAIN: ICD-10-CM

## 2025-08-14 RX ORDER — ETODOLAC 400 MG/1
0.4 TABLET, FILM COATED ORAL 2 TIMES DAILY
Qty: 60 TABLET | Refills: 6 | OUTPATIENT
Start: 2025-08-14

## 2025-08-14 RX ORDER — ETODOLAC 400 MG/1
400 TABLET, FILM COATED ORAL 2 TIMES DAILY
Qty: 60 TABLET | Refills: 6 | Status: SHIPPED | OUTPATIENT
Start: 2025-08-14

## 2025-08-15 DIAGNOSIS — L40.50 PSA (PSORIATIC ARTHRITIS): Primary | ICD-10-CM

## 2025-08-17 RX ORDER — APREMILAST 30 MG/1
30 TABLET, FILM COATED ORAL 2 TIMES DAILY
Qty: 60 TABLET | Refills: 11 | Status: ACTIVE | OUTPATIENT
Start: 2025-08-17

## 2025-08-18 ENCOUNTER — TELEPHONE (OUTPATIENT)
Dept: NEUROSURGERY | Facility: CLINIC | Age: 60
End: 2025-08-18
Payer: MEDICARE

## 2025-08-18 ENCOUNTER — PATIENT MESSAGE (OUTPATIENT)
Dept: PODIATRY | Facility: CLINIC | Age: 60
End: 2025-08-18
Payer: MEDICARE

## 2025-08-18 DIAGNOSIS — M47.812 CERVICAL SPONDYLOSIS: Primary | ICD-10-CM

## 2025-08-20 ENCOUNTER — OFFICE VISIT (OUTPATIENT)
Dept: NEUROSURGERY | Facility: CLINIC | Age: 60
End: 2025-08-20
Payer: MEDICARE

## 2025-08-20 ENCOUNTER — HOSPITAL ENCOUNTER (OUTPATIENT)
Dept: RADIOLOGY | Facility: HOSPITAL | Age: 60
Discharge: HOME | End: 2025-08-20
Attending: PHYSICIAN ASSISTANT
Payer: MEDICARE

## 2025-08-20 VITALS
HEART RATE: 94 BPM | DIASTOLIC BLOOD PRESSURE: 79 MMHG | HEIGHT: 67 IN | SYSTOLIC BLOOD PRESSURE: 114 MMHG | BODY MASS INDEX: 29.52 KG/M2 | WEIGHT: 188.06 LBS

## 2025-08-20 DIAGNOSIS — M54.2 NECK PAIN: Primary | ICD-10-CM

## 2025-08-20 DIAGNOSIS — M47.812 CERVICAL SPONDYLOSIS: ICD-10-CM

## 2025-08-20 DIAGNOSIS — M47.12 CERVICAL SPONDYLOSIS WITH MYELOPATHY: ICD-10-CM

## 2025-08-20 DIAGNOSIS — M48.02 CERVICAL SPINAL STENOSIS: ICD-10-CM

## 2025-08-20 DIAGNOSIS — M50.00 HNP (HERNIATED NUCLEUS PULPOSUS) WITH MYELOPATHY, CERVICAL: ICD-10-CM

## 2025-08-20 DIAGNOSIS — M50.30 DDD (DEGENERATIVE DISC DISEASE), CERVICAL: ICD-10-CM

## 2025-08-20 PROCEDURE — 72050 X-RAY EXAM NECK SPINE 4/5VWS: CPT | Mod: TC

## 2025-08-20 PROCEDURE — 99214 OFFICE O/P EST MOD 30 MIN: CPT | Mod: PBBFAC,25,PN | Performed by: PHYSICIAN ASSISTANT

## 2025-08-20 PROCEDURE — 99999 PR PBB SHADOW E&M-EST. PATIENT-LVL IV: CPT | Mod: PBBFAC,,, | Performed by: PHYSICIAN ASSISTANT

## 2025-08-20 PROCEDURE — 72050 X-RAY EXAM NECK SPINE 4/5VWS: CPT | Mod: 26,,, | Performed by: RADIOLOGY

## 2025-08-27 ENCOUNTER — PATIENT MESSAGE (OUTPATIENT)
Dept: NEUROSURGERY | Facility: CLINIC | Age: 60
End: 2025-08-27
Payer: MEDICARE

## 2025-08-27 ENCOUNTER — HOSPITAL ENCOUNTER (OUTPATIENT)
Dept: RADIOLOGY | Facility: HOSPITAL | Age: 60
Discharge: HOME | End: 2025-08-27
Attending: PHYSICIAN ASSISTANT
Payer: MEDICARE

## 2025-08-27 DIAGNOSIS — M48.02 CERVICAL SPINAL STENOSIS: ICD-10-CM

## 2025-08-27 DIAGNOSIS — M54.2 NECK PAIN: ICD-10-CM

## 2025-08-27 DIAGNOSIS — M50.30 DDD (DEGENERATIVE DISC DISEASE), CERVICAL: ICD-10-CM

## 2025-08-27 DIAGNOSIS — M47.12 CERVICAL SPONDYLOSIS WITH MYELOPATHY: ICD-10-CM

## 2025-08-27 DIAGNOSIS — M50.00 HNP (HERNIATED NUCLEUS PULPOSUS) WITH MYELOPATHY, CERVICAL: ICD-10-CM

## 2025-08-27 PROCEDURE — 72141 MRI NECK SPINE W/O DYE: CPT | Mod: TC,PO

## 2025-08-29 ENCOUNTER — TELEPHONE (OUTPATIENT)
Dept: NEUROSURGERY | Facility: CLINIC | Age: 60
End: 2025-08-29
Payer: MEDICARE

## 2025-09-03 ENCOUNTER — OFFICE VISIT (OUTPATIENT)
Dept: NEUROSURGERY | Facility: CLINIC | Age: 60
End: 2025-09-03
Payer: MEDICARE

## 2025-09-03 VITALS
HEIGHT: 67 IN | HEART RATE: 85 BPM | BODY MASS INDEX: 29.52 KG/M2 | DIASTOLIC BLOOD PRESSURE: 85 MMHG | WEIGHT: 188.06 LBS | SYSTOLIC BLOOD PRESSURE: 128 MMHG

## 2025-09-03 DIAGNOSIS — M48.02 SPINAL STENOSIS, CERVICAL REGION: Primary | ICD-10-CM

## 2025-09-03 DIAGNOSIS — M47.22 CERVICAL SPONDYLOSIS WITH MYELOPATHY AND RADICULOPATHY: ICD-10-CM

## 2025-09-03 DIAGNOSIS — M54.12 CERVICAL RADICULOPATHY: ICD-10-CM

## 2025-09-03 DIAGNOSIS — M47.12 CERVICAL SPONDYLOSIS WITH MYELOPATHY AND RADICULOPATHY: ICD-10-CM

## 2025-09-03 DIAGNOSIS — M48.02 SPINAL STENOSIS, CERVICAL REGION: ICD-10-CM

## 2025-09-03 PROCEDURE — 99215 OFFICE O/P EST HI 40 MIN: CPT | Mod: S$PBB,,, | Performed by: NEUROLOGICAL SURGERY

## 2025-09-03 PROCEDURE — 99999 PR PBB SHADOW E&M-EST. PATIENT-LVL IV: CPT | Mod: PBBFAC,,, | Performed by: NEUROLOGICAL SURGERY

## 2025-09-03 PROCEDURE — 99214 OFFICE O/P EST MOD 30 MIN: CPT | Mod: PBBFAC,PN | Performed by: NEUROLOGICAL SURGERY

## 2025-09-03 RX ORDER — SUZETRIGINE 50 MG/1
50 TABLET, FILM COATED ORAL 2 TIMES DAILY
Qty: 60 TABLET | Refills: 0 | Status: SHIPPED | OUTPATIENT
Start: 2025-09-03 | End: 2025-09-03 | Stop reason: SDUPTHER

## 2025-09-04 RX ORDER — SUZETRIGINE 50 MG/1
50 TABLET, FILM COATED ORAL 2 TIMES DAILY
Qty: 60 TABLET | Refills: 0 | Status: SHIPPED | OUTPATIENT
Start: 2025-09-04

## 2025-09-05 ENCOUNTER — TELEPHONE (OUTPATIENT)
Dept: NEUROSURGERY | Facility: CLINIC | Age: 60
End: 2025-09-05
Payer: MEDICARE

## (undated) DEVICE — SUT VICRYL 3-0 27 SH

## (undated) DEVICE — SYR 10CC LUER LOCK

## (undated) DEVICE — DRAPE THYROID WITH ARMBOARD

## (undated) DEVICE — SUT SILK 2-0 BLK BR PS-2 18

## (undated) DEVICE — ADHESIVE MASTISOL VIAL 48/BX

## (undated) DEVICE — CUFF TOURNIQUET DL PRT

## (undated) DEVICE — PAD SHOULDER POLAR CARE XL

## (undated) DEVICE — STRIP MEDI WND CLSR 1/2X4IN

## (undated) DEVICE — SUT MCRYL PLUS 4-0 PS2 27IN

## (undated) DEVICE — SUT MONOCRYL PLUS UD 3-0 27

## (undated) DEVICE — SYR 30CC LUER LOCK

## (undated) DEVICE — DRAPE C ARM 42 X 120 10/BX

## (undated) DEVICE — DRESSING AQUACEL AG SILVER 4X4

## (undated) DEVICE — Device

## (undated) DEVICE — SUT 3-0 VICRYL / SH (J416)

## (undated) DEVICE — CARTRIDGE OIL

## (undated) DEVICE — KIT SHOULDER POSITIONER SPIDER

## (undated) DEVICE — SOL NACL STRL BOTTLE 1000ML

## (undated) DEVICE — REAMER LOW PROFILE 7MM

## (undated) DEVICE — DRESSING XEROFORM 1X8IN

## (undated) DEVICE — KIT ANTIFOG

## (undated) DEVICE — NDL HYPO 27G X 1 1/2

## (undated) DEVICE — DRAIN CHANNEL ROUND 10FR

## (undated) DEVICE — BNDG COFLEX FOAM LF2 ST 6X5YD

## (undated) DEVICE — KIT SURGIFLO EVITHROM

## (undated) DEVICE — SLING SHOT II LARGE

## (undated) DEVICE — ELECTRODE REM PLYHSV RETURN 9

## (undated) DEVICE — SYR LUER LOCK 1CC

## (undated) DEVICE — SEE MEDLINE ITEM 146292

## (undated) DEVICE — TUBE SET INFLOW/OUTFLOW

## (undated) DEVICE — SPONGE COTTON TRAY 4X4IN

## (undated) DEVICE — DRAPE STERI INSTRUMENT 1018

## (undated) DEVICE — BLADE SHAVER LANZA 4.2X13CM

## (undated) DEVICE — SEE MEDLINE ITEM 157150

## (undated) DEVICE — DRESSING AQUACEL AG ADV 3.5X6

## (undated) DEVICE — CLOSURE SKIN STERI STRIP 1/2X4

## (undated) DEVICE — DRAPE OPMI STERILE

## (undated) DEVICE — DRESSING AQUACEL FOAM 3 X 3

## (undated) DEVICE — SET EXTENSION STERILE 30IN

## (undated) DEVICE — CORD BIPOLAR 12 FOOT

## (undated) DEVICE — GLOVE SENSICARE PI SURG 8

## (undated) DEVICE — PENCIL ROCKER SWITCH 10FT CORD

## (undated) DEVICE — PUMP COLD THERAPY

## (undated) DEVICE — SEE MEDLINE ITEM 152622

## (undated) DEVICE — NDL 18GA X1 1/2 REG BEVEL

## (undated) DEVICE — BUR BONE CUT MICRO TPS 3X3.8MM

## (undated) DEVICE — DIFFUSER

## (undated) DEVICE — SUT VICRYL PLUS 0 CT1 36IN

## (undated) DEVICE — DRESSING AQUACEL AG FOAM 4X4

## (undated) DEVICE — WRAP SHLDR HIP ACCU THRM PACK

## (undated) DEVICE — GAUZE SPONGE PEANUT STRL

## (undated) DEVICE — PACK LOWER EXTREMITY

## (undated) DEVICE — COVER LIGHT HANDLE 80/CA

## (undated) DEVICE — DRAPE MICROSCOPE OPMI

## (undated) DEVICE — SYR ONLY LUER LOCK 20CC

## (undated) DEVICE — DRESSING TELFA N ADH 3X8

## (undated) DEVICE — SOL NACL IRR 3000ML

## (undated) DEVICE — PAD ABD 8X10 STERILE

## (undated) DEVICE — ADHESIVE DERMABOND ADVANCED

## (undated) DEVICE — NDL HYPO REG 25G X 1 1/2

## (undated) DEVICE — DRESSING TRANS 4X4 TEGADERM

## (undated) DEVICE — BLADE POWERASP 4.0MMX13CM

## (undated) DEVICE — DRAPE STERI-DRAPE 1000 17X11IN

## (undated) DEVICE — PAD ABDOMINAL STERILE 8X10IN

## (undated) DEVICE — PAD CAST SPECIALIST STRL 4

## (undated) DEVICE — CANNULA TWIST IN 7MM X 7CM

## (undated) DEVICE — NDL SPINAL 18GX3.5 SPINOCAN

## (undated) DEVICE — SUT ETHILON 4-0 PS2 18 BLK

## (undated) DEVICE — SUT ETHILON 3-0 PS2 18 BLK

## (undated) DEVICE — DRAPE STERI U-SHAPED 47X51IN

## (undated) DEVICE — REPAIR KIT SMALL JOINT BIO TEN

## (undated) DEVICE — SOL IRR NACL .9% 3000ML

## (undated) DEVICE — STOPCOCK DISCOFIX 3 WAY

## (undated) DEVICE — SUT MONOCRYL 3-0 PS-2 UND

## (undated) DEVICE — NDL 22GA X1 1/2 REG BEVEL

## (undated) DEVICE — CANNULA PASSPORT 8 MM X 4CM.

## (undated) DEVICE — BLADE SHAVER 4.5 6/BX

## (undated) DEVICE — SEE MEDLINE ITEM 156905

## (undated) DEVICE — YANKAUER OPEN TIP W/O VENT

## (undated) DEVICE — GLOVE SENSICARE PI GRN 8.5

## (undated) DEVICE — SEE MEDLINE ITEM 146313

## (undated) DEVICE — GLOVE BIOGEL PI MICRO INDIC 7

## (undated) DEVICE — SYS CLSR DERMABOND PRINEO 22CM

## (undated) DEVICE — CONTAINER SPECIMEN STRL 4OZ

## (undated) DEVICE — SCREW DISTRACTION QS 12MM
Type: IMPLANTABLE DEVICE | Site: SPINE CERVICAL | Status: NON-FUNCTIONAL
Removed: 2018-01-23

## (undated) DEVICE — TAPE SURG DURAPORE 2 X10YD

## (undated) DEVICE — APPLICATOR CHLORAPREP ORN 26ML

## (undated) DEVICE — PAD SHOULDER CARE POLAR

## (undated) DEVICE — NDL SCORPION HD MEGALOADER

## (undated) DEVICE — DRESSING XEROFORM FOIL PK 1X8

## (undated) DEVICE — SUT SILK 3-0 SH 18IN BLACK

## (undated) DEVICE — MARKER SKIN STND TIP BLUE BARR

## (undated) DEVICE — SUT 4-0 ETHILON 18 PS-2

## (undated) DEVICE — PROBE ARTHO ENERGY 90 DEG

## (undated) DEVICE — SUT MONOCRYL 5-0 P-3 UND 18

## (undated) DEVICE — TOWEL OR DISP STRL BLUE 4/PK

## (undated) DEVICE — COVER CAMERA OPERATING ROOM

## (undated) DEVICE — SUT BLU BR 2 TAPERD NDL 1/2

## (undated) DEVICE — GAUZE CNFRM STRL 4INX4.1YD

## (undated) DEVICE — EVACUATOR WOUND BULB 100CC

## (undated) DEVICE — STERILE WATER 1000ML BOTTLE

## (undated) DEVICE — SEE MEDLINE ITEM 152530

## (undated) DEVICE — DRESSING XEROFORM NONADH 1X8IN

## (undated) DEVICE — SUT ETHILON 3/0 18IN PS-1

## (undated) DEVICE — CONNECTOR TUBING STR 5 IN 1

## (undated) DEVICE — GLOVE PI ULTRA TOUCH G SURGEON

## (undated) DEVICE — SUPPORT SLING SHOT II MEDIUM

## (undated) DEVICE — DRESSING SURGICAL 1/2X1/2

## (undated) DEVICE — PAD ELECTROSURGICAL DISP ADULT

## (undated) DEVICE — CHLORAPREP 3ML APPLICATOR TINT

## (undated) DEVICE — SHEET EENT SPLIT

## (undated) DEVICE — DRESSING SPONGE 8PLY 4X4 STRL

## (undated) DEVICE — BANDAGE MATRIX HK LOOP 4IN 5YD

## (undated) DEVICE — SEE MEDLINE ITEM 146417

## (undated) DEVICE — TRAY ENT 4/CS

## (undated) DEVICE — NDL SURGEON MAYO #4

## (undated) DEVICE — PACK SET UP CONVERTORS

## (undated) DEVICE — KIT SURGIFLO HEMOSTATIC MATRIX

## (undated) DEVICE — TUBING SUC UNIV W/CONN 12FT